# Patient Record
Sex: MALE | Race: OTHER | NOT HISPANIC OR LATINO | Employment: FULL TIME | URBAN - METROPOLITAN AREA
[De-identification: names, ages, dates, MRNs, and addresses within clinical notes are randomized per-mention and may not be internally consistent; named-entity substitution may affect disease eponyms.]

---

## 2023-03-29 LAB
BASOPHILS (10*3/UL) IN BLOOD BY AUTOMATED COUNT: 0.05 X10E9/L (ref 0–0.1)
BASOPHILS/100 LEUKOCYTES IN BLOOD BY AUTOMATED COUNT: 0.5 % (ref 0–2)
EOSINOPHILS (10*3/UL) IN BLOOD BY AUTOMATED COUNT: 0.2 X10E9/L (ref 0–0.7)
EOSINOPHILS/100 LEUKOCYTES IN BLOOD BY AUTOMATED COUNT: 2 % (ref 0–6)
ERYTHROCYTE DISTRIBUTION WIDTH (RATIO) BY AUTOMATED COUNT: 11.9 % (ref 11.5–14.5)
ERYTHROCYTE MEAN CORPUSCULAR HEMOGLOBIN CONCENTRATION (G/DL) BY AUTOMATED: 33.8 G/DL (ref 32–36)
ERYTHROCYTE MEAN CORPUSCULAR VOLUME (FL) BY AUTOMATED COUNT: 93 FL (ref 80–100)
ERYTHROCYTES (10*6/UL) IN BLOOD BY AUTOMATED COUNT: 4.77 X10E12/L (ref 4.5–5.9)
HEMATOCRIT (%) IN BLOOD BY AUTOMATED COUNT: 44.4 % (ref 41–52)
HEMOGLOBIN (G/DL) IN BLOOD: 15 G/DL (ref 13.5–17.5)
IMMATURE GRANULOCYTES/100 LEUKOCYTES IN BLOOD BY AUTOMATED COUNT: 0.5 % (ref 0–0.9)
LEUKOCYTES (10*3/UL) IN BLOOD BY AUTOMATED COUNT: 10.1 X10E9/L (ref 4.4–11.3)
LYMPHOCYTES (10*3/UL) IN BLOOD BY AUTOMATED COUNT: 1.71 X10E9/L (ref 1.2–4.8)
LYMPHOCYTES/100 LEUKOCYTES IN BLOOD BY AUTOMATED COUNT: 17 % (ref 13–44)
MONOCYTES (10*3/UL) IN BLOOD BY AUTOMATED COUNT: 1.08 X10E9/L (ref 0.1–1)
MONOCYTES/100 LEUKOCYTES IN BLOOD BY AUTOMATED COUNT: 10.7 % (ref 2–10)
NEUTROPHILS (10*3/UL) IN BLOOD BY AUTOMATED COUNT: 6.96 X10E9/L (ref 1.2–7.7)
NEUTROPHILS/100 LEUKOCYTES IN BLOOD BY AUTOMATED COUNT: 69.3 % (ref 40–80)
NRBC (PER 100 WBCS) BY AUTOMATED COUNT: 0 /100 WBC (ref 0–0)
PLATELETS (10*3/UL) IN BLOOD AUTOMATED COUNT: 266 X10E9/L (ref 150–450)

## 2023-07-01 LAB
ALANINE AMINOTRANSFERASE (SGPT) (U/L) IN SER/PLAS: 55 U/L (ref 10–52)
ALBUMIN (G/DL) IN SER/PLAS: 4.8 G/DL (ref 3.4–5)
ALKALINE PHOSPHATASE (U/L) IN SER/PLAS: 49 U/L (ref 33–120)
ASPARTATE AMINOTRANSFERASE (SGOT) (U/L) IN SER/PLAS: 31 U/L (ref 9–39)
BASOPHILS (10*3/UL) IN BLOOD BY AUTOMATED COUNT: 0.02 X10E9/L (ref 0–0.1)
BASOPHILS/100 LEUKOCYTES IN BLOOD BY AUTOMATED COUNT: 0.3 % (ref 0–2)
BILIRUBIN DIRECT (MG/DL) IN SER/PLAS: 0.1 MG/DL (ref 0–0.3)
BILIRUBIN TOTAL (MG/DL) IN SER/PLAS: 0.5 MG/DL (ref 0–1.2)
EOSINOPHILS (10*3/UL) IN BLOOD BY AUTOMATED COUNT: 0.17 X10E9/L (ref 0–0.7)
EOSINOPHILS/100 LEUKOCYTES IN BLOOD BY AUTOMATED COUNT: 2.3 % (ref 0–6)
ERYTHROCYTE DISTRIBUTION WIDTH (RATIO) BY AUTOMATED COUNT: 12.2 % (ref 11.5–14.5)
ERYTHROCYTE MEAN CORPUSCULAR HEMOGLOBIN CONCENTRATION (G/DL) BY AUTOMATED: 33.3 G/DL (ref 32–36)
ERYTHROCYTE MEAN CORPUSCULAR VOLUME (FL) BY AUTOMATED COUNT: 94 FL (ref 80–100)
ERYTHROCYTES (10*6/UL) IN BLOOD BY AUTOMATED COUNT: 4.84 X10E12/L (ref 4.5–5.9)
HEMATOCRIT (%) IN BLOOD BY AUTOMATED COUNT: 45.7 % (ref 41–52)
HEMOGLOBIN (G/DL) IN BLOOD: 15.2 G/DL (ref 13.5–17.5)
IMMATURE GRANULOCYTES/100 LEUKOCYTES IN BLOOD BY AUTOMATED COUNT: 0.4 % (ref 0–0.9)
LEUKOCYTES (10*3/UL) IN BLOOD BY AUTOMATED COUNT: 7.5 X10E9/L (ref 4.4–11.3)
LYMPHOCYTES (10*3/UL) IN BLOOD BY AUTOMATED COUNT: 1.53 X10E9/L (ref 1.2–4.8)
LYMPHOCYTES/100 LEUKOCYTES IN BLOOD BY AUTOMATED COUNT: 20.5 % (ref 13–44)
MONOCYTES (10*3/UL) IN BLOOD BY AUTOMATED COUNT: 0.73 X10E9/L (ref 0.1–1)
MONOCYTES/100 LEUKOCYTES IN BLOOD BY AUTOMATED COUNT: 9.8 % (ref 2–10)
NEUTROPHILS (10*3/UL) IN BLOOD BY AUTOMATED COUNT: 4.97 X10E9/L (ref 1.2–7.7)
NEUTROPHILS/100 LEUKOCYTES IN BLOOD BY AUTOMATED COUNT: 66.7 % (ref 40–80)
NRBC (PER 100 WBCS) BY AUTOMATED COUNT: 0 /100 WBC (ref 0–0)
PLATELETS (10*3/UL) IN BLOOD AUTOMATED COUNT: 238 X10E9/L (ref 150–450)
PROTEIN TOTAL: 7.1 G/DL (ref 6.4–8.2)

## 2023-09-24 LAB
BASOPHILS (10*3/UL) IN BLOOD BY AUTOMATED COUNT: 0.05 X10E9/L (ref 0–0.1)
BASOPHILS/100 LEUKOCYTES IN BLOOD BY AUTOMATED COUNT: 0.6 % (ref 0–2)
EOSINOPHILS (10*3/UL) IN BLOOD BY AUTOMATED COUNT: 0.21 X10E9/L (ref 0–0.7)
EOSINOPHILS/100 LEUKOCYTES IN BLOOD BY AUTOMATED COUNT: 2.7 % (ref 0–6)
ERYTHROCYTE DISTRIBUTION WIDTH (RATIO) BY AUTOMATED COUNT: 12 % (ref 11.5–14.5)
ERYTHROCYTE MEAN CORPUSCULAR HEMOGLOBIN CONCENTRATION (G/DL) BY AUTOMATED: 33.1 G/DL (ref 32–36)
ERYTHROCYTE MEAN CORPUSCULAR VOLUME (FL) BY AUTOMATED COUNT: 94 FL (ref 80–100)
ERYTHROCYTES (10*6/UL) IN BLOOD BY AUTOMATED COUNT: 5.02 X10E12/L (ref 4.5–5.9)
HEMATOCRIT (%) IN BLOOD BY AUTOMATED COUNT: 47.2 % (ref 41–52)
HEMOGLOBIN (G/DL) IN BLOOD: 15.6 G/DL (ref 13.5–17.5)
IMMATURE GRANULOCYTES/100 LEUKOCYTES IN BLOOD BY AUTOMATED COUNT: 0.5 % (ref 0–0.9)
LEUKOCYTES (10*3/UL) IN BLOOD BY AUTOMATED COUNT: 7.7 X10E9/L (ref 4.4–11.3)
LYMPHOCYTES (10*3/UL) IN BLOOD BY AUTOMATED COUNT: 1.2 X10E9/L (ref 1.2–4.8)
LYMPHOCYTES/100 LEUKOCYTES IN BLOOD BY AUTOMATED COUNT: 15.6 % (ref 13–44)
MONOCYTES (10*3/UL) IN BLOOD BY AUTOMATED COUNT: 0.79 X10E9/L (ref 0.1–1)
MONOCYTES/100 LEUKOCYTES IN BLOOD BY AUTOMATED COUNT: 10.3 % (ref 2–10)
NEUTROPHILS (10*3/UL) IN BLOOD BY AUTOMATED COUNT: 5.41 X10E9/L (ref 1.2–7.7)
NEUTROPHILS/100 LEUKOCYTES IN BLOOD BY AUTOMATED COUNT: 70.3 % (ref 40–80)
NRBC (PER 100 WBCS) BY AUTOMATED COUNT: 0 /100 WBC (ref 0–0)
PLATELETS (10*3/UL) IN BLOOD AUTOMATED COUNT: 245 X10E9/L (ref 150–450)

## 2023-10-10 ENCOUNTER — HOSPITAL ENCOUNTER (OUTPATIENT)
Dept: RADIOLOGY | Facility: CLINIC | Age: 28
Discharge: HOME | End: 2023-10-10
Payer: COMMERCIAL

## 2023-10-10 DIAGNOSIS — G35 MULTIPLE SCLEROSIS (MULTI): ICD-10-CM

## 2023-10-10 PROCEDURE — 2550000001 HC RX 255 CONTRASTS: Performed by: NURSE PRACTITIONER

## 2023-10-10 PROCEDURE — 70553 MRI BRAIN STEM W/O & W/DYE: CPT

## 2023-10-10 PROCEDURE — 70553 MRI BRAIN STEM W/O & W/DYE: CPT | Performed by: RADIOLOGY

## 2023-10-10 PROCEDURE — A9575 INJ GADOTERATE MEGLUMI 0.1ML: HCPCS | Performed by: NURSE PRACTITIONER

## 2023-10-10 RX ORDER — GADOTERATE MEGLUMINE 376.9 MG/ML
20 INJECTION INTRAVENOUS
Status: COMPLETED | OUTPATIENT
Start: 2023-10-10 | End: 2023-10-10

## 2023-10-10 RX ADMIN — GADOTERATE MEGLUMINE 20 ML: 376.9 INJECTION INTRAVENOUS at 09:28

## 2023-10-12 DIAGNOSIS — G35 MULTIPLE SCLEROSIS (MULTI): Primary | ICD-10-CM

## 2023-10-12 RX ORDER — DIROXIMEL FUMARATE 231 MG/1
2 CAPSULE ORAL 2 TIMES DAILY
Qty: 120 CAPSULE | Refills: 5 | Status: SHIPPED | OUTPATIENT
Start: 2023-10-12 | End: 2024-03-15

## 2023-12-21 ENCOUNTER — APPOINTMENT (OUTPATIENT)
Dept: NEUROLOGY | Facility: CLINIC | Age: 28
End: 2023-12-21
Payer: COMMERCIAL

## 2023-12-26 ENCOUNTER — LAB (OUTPATIENT)
Dept: LAB | Facility: LAB | Age: 28
End: 2023-12-26
Payer: COMMERCIAL

## 2023-12-26 DIAGNOSIS — G35 MULTIPLE SCLEROSIS (MULTI): Primary | ICD-10-CM

## 2023-12-26 LAB
BASOPHILS # BLD AUTO: 0.04 X10*3/UL (ref 0–0.1)
BASOPHILS NFR BLD AUTO: 0.4 %
EOSINOPHIL # BLD AUTO: 0.19 X10*3/UL (ref 0–0.7)
EOSINOPHIL NFR BLD AUTO: 2.1 %
ERYTHROCYTE [DISTWIDTH] IN BLOOD BY AUTOMATED COUNT: 11.9 % (ref 11.5–14.5)
HCT VFR BLD AUTO: 43.9 % (ref 41–52)
HGB BLD-MCNC: 15 G/DL (ref 13.5–17.5)
IMM GRANULOCYTES # BLD AUTO: 0.05 X10*3/UL (ref 0–0.7)
IMM GRANULOCYTES NFR BLD AUTO: 0.5 % (ref 0–0.9)
LYMPHOCYTES # BLD AUTO: 1.58 X10*3/UL (ref 1.2–4.8)
LYMPHOCYTES NFR BLD AUTO: 17.3 %
MCH RBC QN AUTO: 31.5 PG (ref 26–34)
MCHC RBC AUTO-ENTMCNC: 34.2 G/DL (ref 32–36)
MCV RBC AUTO: 92 FL (ref 80–100)
MONOCYTES # BLD AUTO: 0.94 X10*3/UL (ref 0.1–1)
MONOCYTES NFR BLD AUTO: 10.3 %
NEUTROPHILS # BLD AUTO: 6.31 X10*3/UL (ref 1.2–7.7)
NEUTROPHILS NFR BLD AUTO: 69.4 %
NRBC BLD-RTO: 0 /100 WBCS (ref 0–0)
PLATELET # BLD AUTO: 230 X10*3/UL (ref 150–450)
RBC # BLD AUTO: 4.76 X10*6/UL (ref 4.5–5.9)
WBC # BLD AUTO: 9.1 X10*3/UL (ref 4.4–11.3)

## 2023-12-26 PROCEDURE — 36415 COLL VENOUS BLD VENIPUNCTURE: CPT

## 2023-12-26 PROCEDURE — 85025 COMPLETE CBC W/AUTO DIFF WBC: CPT

## 2023-12-28 ENCOUNTER — OFFICE VISIT (OUTPATIENT)
Dept: NEUROLOGY | Facility: CLINIC | Age: 28
End: 2023-12-28
Payer: COMMERCIAL

## 2023-12-28 VITALS
DIASTOLIC BLOOD PRESSURE: 84 MMHG | HEIGHT: 72 IN | WEIGHT: 315 LBS | SYSTOLIC BLOOD PRESSURE: 147 MMHG | BODY MASS INDEX: 42.66 KG/M2 | RESPIRATION RATE: 18 BRPM | HEART RATE: 113 BPM

## 2023-12-28 DIAGNOSIS — G35 MULTIPLE SCLEROSIS (MULTI): Primary | ICD-10-CM

## 2023-12-28 PROCEDURE — 99214 OFFICE O/P EST MOD 30 MIN: CPT | Performed by: NURSE PRACTITIONER

## 2023-12-28 PROCEDURE — 1036F TOBACCO NON-USER: CPT | Performed by: NURSE PRACTITIONER

## 2023-12-28 ASSESSMENT — VISUAL ACUITY: VA_NORMAL: 1

## 2023-12-28 ASSESSMENT — PAIN SCALES - GENERAL: PAINLEVEL: 0-NO PAIN

## 2023-12-28 NOTE — PROGRESS NOTES
Patient name:   DISEASE SUMMARY:  Onset: 03/13  Diagnosis of MS: 07/14  Disease course at onset: RR  Current disease course: RR  Previous disease therapies: IVMP in 2013, Tecfidera since end of 8/14 stopped d/t insurance 2021.  Current disease therapies: Vumerity   Most recent MRI brain: 10/2023- stable  Most recent MRI cervical spine: 03/11/15  Most recent MRI thoracic spine: not done  CSF: 03/2013  JCV serology and date: not done  CBC:  WBC- 9.1  ALC-1.58  Subjective   Cirilo Wills is a 28 y.o.   male here for a follow up of his MS, he takes Vumerity and tolerating well. He reports no new MS symptoms, but continue to have right arm intermediate paresthesias ands sometimes pain.  Last MRI of the brain was 10/2023 and stable.    ROS  - No bowel or bladder issues, stable gait, no recent infections, and no trouble swallowing.    Objective   Neurological Exam  Mental Status  Alert. Oriented to person, place, time and situation. Oriented to person, place, and time. Recent and remote memory are intact. Speech is normal. Language is fluent with no aphasia. Attention and concentration are normal.    Cranial Nerves  CN I: Sense of smell is normal.  CN II: Tested with correction. Visual acuity is normal.  CN III, IV, VI: Normal lids and orbits bilaterally. Pupils equal round and reactive to light bilaterally.  CN V: Facial sensation is normal.  CN VII: Full and symmetric facial movement.  CN VIII: Hearing is normal.  CN IX, X: Palate elevates symmetrically  CN XI: Shoulder shrug strength is normal.  CN XII: Tongue midline without atrophy or fasciculations.    Motor  Normal muscle bulk throughout. Normal muscle tone. Strength is 5/5 throughout all four extremities.    Sensory  Light touch abnormality:   Reports decreased sensation on right side of face which is not new..    Reflexes                                            Right                      Left  Brachioradialis                    2+                          2+  Biceps                                 2+                         2+  Triceps                                2+                         2+  Patellar                                2+                         2+   Right palmar grasp present. Left palmar grasp present.    Coordination    Finger-to-nose, rapid alternating movements and heel-to-shin normal bilaterally without dysmetria.  9 hole peg test: Right hand 14.7 sec. Left hand 17.2 sec..    Gait  Casual gait is normal including stance, stride, and arm swing. Timed get up and go test: 6 seconds.    Physical Exam  Constitutional:       Appearance: Normal appearance.   HENT:      Head: Normocephalic.      Right Ear: Tympanic membrane normal.      Left Ear: Tympanic membrane normal.      Nose: Nose normal.      Mouth/Throat:      Mouth: Mucous membranes are moist.   Eyes:      General: Lids are normal.      Pupils: Pupils are equal, round, and reactive to light.   Cardiovascular:      Rate and Rhythm: Normal rate.   Pulmonary:      Effort: Pulmonary effort is normal.   Abdominal:      General: Abdomen is flat.   Musculoskeletal:         General: Normal range of motion.      Cervical back: Full passive range of motion without pain and normal range of motion.   Skin:     General: Skin is warm and dry.   Neurological:      Mental Status: He is alert and oriented to person, place, and time.      Motor: Motor strength is normal.     Coordination: Coordination is intact.      Deep Tendon Reflexes:      Reflex Scores:       Tricep reflexes are 2+ on the right side and 2+ on the left side.       Bicep reflexes are 2+ on the right side and 2+ on the left side.       Brachioradialis reflexes are 2+ on the right side and 2+ on the left side.       Patellar reflexes are 2+ on the right side and 2+ on the left side.  Psychiatric:         Attention and Perception: Attention normal.         Mood and Affect: Mood normal.         Speech: Speech normal.         Behavior: Behavior  normal. Behavior is cooperative.         Cognition and Memory: Cognition normal.         Judgment: Judgment normal.       Provider Impression  Cirilo Wills is a 28 y.o.  RH male here for a follow up of his MS, he takes Vumerity and tolerating well. He reports no new MS symptoms, but continue to have right arm intermediate paresthesias ands sometimes pain.  Last MRI of the brain was 10/2023 and stable.    We discussed the importance of living healthy life style with diet and exercise and the importance of V-D in patient's with MS.    The total face to face appointment was 45 minutes and more than 50% of the visit was spent counseling and coordination of care.    I personally reviewed laboratory, radiographic, and medical studies which were pertinent for today's visit.    Plan  - Continue Vumerity.  - Labs today.  - MRI June 2024.  - Follow up 6 month.o90

## 2024-03-14 ENCOUNTER — TELEPHONE (OUTPATIENT)
Dept: PRIMARY CARE | Facility: CLINIC | Age: 29
End: 2024-03-14

## 2024-03-15 DIAGNOSIS — G35 MULTIPLE SCLEROSIS (MULTI): ICD-10-CM

## 2024-03-15 RX ORDER — DIROXIMEL FUMARATE 231 MG/1
2 CAPSULE ORAL 2 TIMES DAILY
Qty: 120 CAPSULE | Refills: 5 | Status: SHIPPED | OUTPATIENT
Start: 2024-03-15

## 2024-03-18 ENCOUNTER — LAB (OUTPATIENT)
Dept: LAB | Facility: LAB | Age: 29
End: 2024-03-18
Payer: COMMERCIAL

## 2024-03-18 ENCOUNTER — HOSPITAL ENCOUNTER (OUTPATIENT)
Dept: RADIOLOGY | Facility: CLINIC | Age: 29
Discharge: HOME | End: 2024-03-18
Payer: COMMERCIAL

## 2024-03-18 ENCOUNTER — OFFICE VISIT (OUTPATIENT)
Dept: PRIMARY CARE | Facility: CLINIC | Age: 29
End: 2024-03-18
Payer: COMMERCIAL

## 2024-03-18 VITALS
WEIGHT: 315 LBS | BODY MASS INDEX: 45.03 KG/M2 | TEMPERATURE: 98 F | DIASTOLIC BLOOD PRESSURE: 84 MMHG | HEART RATE: 111 BPM | SYSTOLIC BLOOD PRESSURE: 140 MMHG

## 2024-03-18 DIAGNOSIS — Z11.59 ENCOUNTER FOR HEPATITIS C SCREENING TEST FOR LOW RISK PATIENT: ICD-10-CM

## 2024-03-18 DIAGNOSIS — Z13.1 DIABETES MELLITUS SCREENING: ICD-10-CM

## 2024-03-18 DIAGNOSIS — Z11.4 ENCOUNTER FOR SCREENING FOR HIV: ICD-10-CM

## 2024-03-18 DIAGNOSIS — R06.09 EXERTIONAL DYSPNEA: ICD-10-CM

## 2024-03-18 DIAGNOSIS — Z13.220 SCREENING CHOLESTEROL LEVEL: ICD-10-CM

## 2024-03-18 DIAGNOSIS — Z23 IMMUNIZATION DUE: ICD-10-CM

## 2024-03-18 DIAGNOSIS — Z00.00 ANNUAL PHYSICAL EXAM: ICD-10-CM

## 2024-03-18 DIAGNOSIS — Z00.00 HEALTH CARE MAINTENANCE: Primary | ICD-10-CM

## 2024-03-18 DIAGNOSIS — R94.31 ABNORMAL EKG: ICD-10-CM

## 2024-03-18 DIAGNOSIS — G35 MULTIPLE SCLEROSIS (MULTI): ICD-10-CM

## 2024-03-18 PROBLEM — K57.32 DIVERTICULITIS, COLON: Status: ACTIVE | Noted: 2023-01-05

## 2024-03-18 PROBLEM — E03.9 HYPOTHYROIDISM: Status: ACTIVE | Noted: 2019-01-25

## 2024-03-18 PROBLEM — G47.33 OBSTRUCTIVE SLEEP APNEA: Status: ACTIVE | Noted: 2019-03-20

## 2024-03-18 PROBLEM — R73.03 PREDIABETES: Status: ACTIVE | Noted: 2024-03-18

## 2024-03-18 PROBLEM — E78.2 COMBINED HYPERLIPIDEMIA: Status: ACTIVE | Noted: 2024-03-18

## 2024-03-18 PROBLEM — R79.89 LOW VITAMIN D LEVEL: Status: ACTIVE | Noted: 2024-03-18

## 2024-03-18 LAB
ALBUMIN SERPL BCP-MCNC: 4.7 G/DL (ref 3.4–5)
ALP SERPL-CCNC: 53 U/L (ref 33–120)
ALT SERPL W P-5'-P-CCNC: 55 U/L (ref 10–52)
ANION GAP SERPL CALC-SCNC: 15 MMOL/L (ref 10–20)
AST SERPL W P-5'-P-CCNC: 28 U/L (ref 9–39)
BILIRUB DIRECT SERPL-MCNC: 0 MG/DL (ref 0–0.3)
BILIRUB SERPL-MCNC: 0.4 MG/DL (ref 0–1.2)
BNP SERPL-MCNC: 49 PG/ML (ref 0–99)
BUN SERPL-MCNC: 17 MG/DL (ref 6–23)
CALCIUM SERPL-MCNC: 10.3 MG/DL (ref 8.6–10.6)
CHLORIDE SERPL-SCNC: 101 MMOL/L (ref 98–107)
CO2 SERPL-SCNC: 28 MMOL/L (ref 21–32)
CREAT SERPL-MCNC: 0.95 MG/DL (ref 0.5–1.3)
EGFRCR SERPLBLD CKD-EPI 2021: >90 ML/MIN/1.73M*2
ERYTHROCYTE [DISTWIDTH] IN BLOOD BY AUTOMATED COUNT: 12 % (ref 11.5–14.5)
GLUCOSE SERPL-MCNC: 93 MG/DL (ref 74–99)
HCT VFR BLD AUTO: 45.5 % (ref 41–52)
HCV AB SER QL: NONREACTIVE
HGB BLD-MCNC: 15.6 G/DL (ref 13.5–17.5)
LDLC SERPL DIRECT ASSAY-MCNC: 153 MG/DL (ref 0–129)
MCH RBC QN AUTO: 32.1 PG (ref 26–34)
MCHC RBC AUTO-ENTMCNC: 34.3 G/DL (ref 32–36)
MCV RBC AUTO: 94 FL (ref 80–100)
NRBC BLD-RTO: 0 /100 WBCS (ref 0–0)
PHOSPHATE SERPL-MCNC: 4.7 MG/DL (ref 2.5–4.9)
PLATELET # BLD AUTO: 280 X10*3/UL (ref 150–450)
POTASSIUM SERPL-SCNC: 4.2 MMOL/L (ref 3.5–5.3)
PROT SERPL-MCNC: 7.2 G/DL (ref 6.4–8.2)
RBC # BLD AUTO: 4.86 X10*6/UL (ref 4.5–5.9)
SODIUM SERPL-SCNC: 140 MMOL/L (ref 136–145)
TSH SERPL-ACNC: 3.67 MIU/L (ref 0.44–3.98)
WBC # BLD AUTO: 10.1 X10*3/UL (ref 4.4–11.3)

## 2024-03-18 PROCEDURE — 83721 ASSAY OF BLOOD LIPOPROTEIN: CPT

## 2024-03-18 PROCEDURE — 71046 X-RAY EXAM CHEST 2 VIEWS: CPT

## 2024-03-18 PROCEDURE — 84443 ASSAY THYROID STIM HORMONE: CPT

## 2024-03-18 PROCEDURE — 83880 ASSAY OF NATRIURETIC PEPTIDE: CPT

## 2024-03-18 PROCEDURE — 86803 HEPATITIS C AB TEST: CPT

## 2024-03-18 PROCEDURE — 80053 COMPREHEN METABOLIC PANEL: CPT

## 2024-03-18 PROCEDURE — 99213 OFFICE O/P EST LOW 20 MIN: CPT | Performed by: FAMILY MEDICINE

## 2024-03-18 PROCEDURE — 85027 COMPLETE CBC AUTOMATED: CPT

## 2024-03-18 PROCEDURE — 82248 BILIRUBIN DIRECT: CPT

## 2024-03-18 PROCEDURE — 36415 COLL VENOUS BLD VENIPUNCTURE: CPT

## 2024-03-18 PROCEDURE — 87389 HIV-1 AG W/HIV-1&-2 AB AG IA: CPT

## 2024-03-18 PROCEDURE — 99395 PREV VISIT EST AGE 18-39: CPT | Performed by: FAMILY MEDICINE

## 2024-03-18 PROCEDURE — 71046 X-RAY EXAM CHEST 2 VIEWS: CPT | Performed by: RADIOLOGY

## 2024-03-18 PROCEDURE — 93000 ELECTROCARDIOGRAM COMPLETE: CPT | Performed by: FAMILY MEDICINE

## 2024-03-18 PROCEDURE — 84100 ASSAY OF PHOSPHORUS: CPT

## 2024-03-18 PROCEDURE — 1036F TOBACCO NON-USER: CPT | Performed by: FAMILY MEDICINE

## 2024-03-18 NOTE — PROGRESS NOTES
Subjective   Patient ID: Cirilo Wills is a 28 y.o. male who presents for Weight Check.  HPI    The patient is a 27 yo AA Male with a history of HDL, hypothyroidism, prediabetes, MS, KG, low vit D, diverticulitis, here for:  1-  CPE.  - blood test ordered.  -No family history of colon or prostate cancer.  -Immunizations: COVID OTD.  Influenza UTD at work.    2- the management of an exertional shortness that started 3- 4 months.     A review of system was completed.  All systems were reviewed and were normal, except for the ones that are listed in the HPI.    Objective   Physical Exam  Constitutional:       Appearance: Normal appearance.   HENT:      Head: Normocephalic and atraumatic.      Right Ear: Tympanic membrane, ear canal and external ear normal.      Left Ear: Tympanic membrane, ear canal and external ear normal.      Nose: Nose normal.      Mouth/Throat:      Mouth: Mucous membranes are moist.      Pharynx: Oropharynx is clear.   Eyes:      Extraocular Movements: Extraocular movements intact.      Conjunctiva/sclera: Conjunctivae normal.      Pupils: Pupils are equal, round, and reactive to light.   Cardiovascular:      Rate and Rhythm: Normal rate and regular rhythm.      Pulses: Normal pulses.   Pulmonary:      Effort: Pulmonary effort is normal.      Breath sounds: Normal breath sounds.   Abdominal:      General: Abdomen is flat. Bowel sounds are normal.      Palpations: Abdomen is soft.   Musculoskeletal:         General: Normal range of motion.      Cervical back: Normal range of motion and neck supple.   Skin:     General: Skin is warm.   Neurological:      General: No focal deficit present.      Mental Status: He is alert and oriented to person, place, and time. Mental status is at baseline.   Psychiatric:         Mood and Affect: Mood normal.         Behavior: Behavior normal.         Thought Content: Thought content normal.         Judgment: Judgment normal.     Assessment/Plan   Problem List Items  Addressed This Visit       Exertional dyspnea     -patient is tachycardic today- HR: 111 at rest.  Repeat HR on EK.   -EKG done.  -Blood and CXR ordered.  -PFT and 2D echo if not better and normal tests above in 2- 4 weeks, then follow-up.          Relevant Orders    ECG 12 lead (Clinic Performed) (Completed)    XR chest 2 views    B-type natriuretic peptide    Complete Pulmonary Function Test (Spirometry/DLCO/Lung Volumes)    LDL cholesterol, direct    Complete Pulmonary Function Test (Spirometry/DLCO/Lung Volumes)    Transthoracic Echo (TTE) Complete    Magnesium    Phosphorus    Stress Test    Annual physical exam    Relevant Orders    Comprehensive Metabolic Panel    CBC    TSH with reflex to Free T4 if abnormal    Health care maintenance - Primary     - blood test ordered.  -No family history of colon or prostate cancer.  -Immunizations: COVID OTD.  Influenza UTD at work.         Immunization due    Encounter for screening for HIV    Relevant Orders    HIV 1/2 Antigen/Antibody Screen with Reflex to Confirmation    Encounter for hepatitis C screening test for low risk patient    Relevant Orders    Hepatitis C Antibody    Screening cholesterol level    Relevant Orders    LDL cholesterol, direct    Diabetes mellitus screening     Other Visit Diagnoses       Abnormal EKG             Patient to return to office in 1 months if not better

## 2024-03-18 NOTE — ASSESSMENT & PLAN NOTE
-patient is tachycardic today- HR: 111 at rest.  Repeat HR on EK.   -EKG done.  -Blood and CXR ordered.  -PFT and 2D echo if not better and normal tests above in 2- 4 weeks, then follow-up.

## 2024-03-19 LAB — HIV 1+2 AB+HIV1 P24 AG SERPL QL IA: NONREACTIVE

## 2024-03-21 DIAGNOSIS — R74.8 ELEVATED LIVER ENZYMES: Primary | ICD-10-CM

## 2024-03-27 ENCOUNTER — HOSPITAL ENCOUNTER (OUTPATIENT)
Dept: CARDIOLOGY | Facility: CLINIC | Age: 29
Discharge: HOME | End: 2024-03-27
Payer: COMMERCIAL

## 2024-03-27 DIAGNOSIS — R06.09 EXERTIONAL DYSPNEA: Primary | ICD-10-CM

## 2024-03-27 DIAGNOSIS — R06.09 EXERTIONAL DYSPNEA: ICD-10-CM

## 2024-03-27 PROCEDURE — 93306 TTE W/DOPPLER COMPLETE: CPT

## 2024-03-27 PROCEDURE — 93306 TTE W/DOPPLER COMPLETE: CPT | Performed by: INTERNAL MEDICINE

## 2024-03-27 PROCEDURE — 2500000004 HC RX 250 GENERAL PHARMACY W/ HCPCS (ALT 636 FOR OP/ED): Performed by: FAMILY MEDICINE

## 2024-03-27 RX ADMIN — PERFLUTREN 3 ML OF DILUTION: 6.52 INJECTION, SUSPENSION INTRAVENOUS at 14:51

## 2024-03-28 LAB
AORTIC VALVE MEAN GRADIENT: 3.8 MMHG
AORTIC VALVE PEAK VELOCITY: 1.22 M/S
AV PEAK GRADIENT: 6 MMHG
AVA (PEAK VEL): 1.82 CM2
AVA (VTI): 1.79 CM2
EJECTION FRACTION APICAL 4 CHAMBER: 29.1
EJECTION FRACTION: 30 %
LEFT ATRIUM VOLUME AREA LENGTH INDEX BSA: 38.4 ML/M2
LEFT VENTRICLE INTERNAL DIMENSION DIASTOLE: 5.55 CM (ref 3.5–6)
LEFT VENTRICULAR OUTFLOW TRACT DIAMETER: 2.01 CM
MITRAL VALVE E/A RATIO: 1.5
MITRAL VALVE E/E' RATIO: 8.95
RIGHT VENTRICLE FREE WALL PEAK S': 18.32 CM/S
TRICUSPID ANNULAR PLANE SYSTOLIC EXCURSION: 1.7 CM

## 2024-04-03 PROBLEM — M25.522 LEFT ELBOW PAIN: Status: ACTIVE | Noted: 2024-04-03

## 2024-04-03 PROBLEM — M25.569 CHRONIC KNEE PAIN: Status: ACTIVE | Noted: 2024-04-03

## 2024-04-03 PROBLEM — R05.3 CHRONIC COUGH: Status: ACTIVE | Noted: 2024-04-03

## 2024-04-03 PROBLEM — E04.9 GOITER: Status: ACTIVE | Noted: 2024-04-03

## 2024-04-03 PROBLEM — E05.90 HYPERTHYROIDISM: Status: ACTIVE | Noted: 2019-01-25

## 2024-04-03 PROBLEM — Z13.220 SCREENING CHOLESTEROL LEVEL: Status: RESOLVED | Noted: 2024-03-18 | Resolved: 2024-04-03

## 2024-04-03 PROBLEM — R30.0 DYSURIA: Status: ACTIVE | Noted: 2024-04-03

## 2024-04-03 PROBLEM — G56.22: Status: ACTIVE | Noted: 2024-04-03

## 2024-04-03 PROBLEM — K52.9 CHRONIC DIARRHEA: Status: ACTIVE | Noted: 2024-04-03

## 2024-04-03 PROBLEM — G89.29 CHRONIC KNEE PAIN: Status: ACTIVE | Noted: 2024-04-03

## 2024-04-03 PROBLEM — F12.90 CANNABIS USE DISORDER: Status: ACTIVE | Noted: 2023-02-02

## 2024-04-05 ENCOUNTER — LAB (OUTPATIENT)
Dept: LAB | Facility: LAB | Age: 29
End: 2024-04-05
Payer: COMMERCIAL

## 2024-04-05 ENCOUNTER — OFFICE VISIT (OUTPATIENT)
Dept: GASTROENTEROLOGY | Facility: CLINIC | Age: 29
End: 2024-04-05
Payer: COMMERCIAL

## 2024-04-05 VITALS
BODY MASS INDEX: 42.66 KG/M2 | WEIGHT: 315 LBS | TEMPERATURE: 98.5 F | DIASTOLIC BLOOD PRESSURE: 75 MMHG | HEIGHT: 72 IN | HEART RATE: 114 BPM | SYSTOLIC BLOOD PRESSURE: 148 MMHG

## 2024-04-05 DIAGNOSIS — R74.8 ELEVATED LIVER ENZYMES: ICD-10-CM

## 2024-04-05 LAB
ALBUMIN SERPL BCP-MCNC: 4.8 G/DL (ref 3.4–5)
ALP SERPL-CCNC: 59 U/L (ref 33–120)
ALT SERPL W P-5'-P-CCNC: 55 U/L (ref 10–52)
ANION GAP SERPL CALC-SCNC: 16 MMOL/L (ref 10–20)
AST SERPL W P-5'-P-CCNC: 28 U/L (ref 9–39)
BASOPHILS # BLD AUTO: 0.05 X10*3/UL (ref 0–0.1)
BASOPHILS NFR BLD AUTO: 0.6 %
BILIRUB SERPL-MCNC: 0.4 MG/DL (ref 0–1.2)
BUN SERPL-MCNC: 15 MG/DL (ref 6–23)
CALCIUM SERPL-MCNC: 10.3 MG/DL (ref 8.6–10.6)
CHLORIDE SERPL-SCNC: 103 MMOL/L (ref 98–107)
CO2 SERPL-SCNC: 26 MMOL/L (ref 21–32)
CREAT SERPL-MCNC: 0.92 MG/DL (ref 0.5–1.3)
EGFRCR SERPLBLD CKD-EPI 2021: >90 ML/MIN/1.73M*2
EOSINOPHIL # BLD AUTO: 0.15 X10*3/UL (ref 0–0.7)
EOSINOPHIL NFR BLD AUTO: 1.9 %
ERYTHROCYTE [DISTWIDTH] IN BLOOD BY AUTOMATED COUNT: 11.9 % (ref 11.5–14.5)
GLUCOSE SERPL-MCNC: 89 MG/DL (ref 74–99)
HBV SURFACE AB SER-ACNC: 40.2 MIU/ML
HCT VFR BLD AUTO: 44.4 % (ref 41–52)
HGB BLD-MCNC: 15.5 G/DL (ref 13.5–17.5)
IMM GRANULOCYTES # BLD AUTO: 0.03 X10*3/UL (ref 0–0.7)
IMM GRANULOCYTES NFR BLD AUTO: 0.4 % (ref 0–0.9)
LYMPHOCYTES # BLD AUTO: 1.51 X10*3/UL (ref 1.2–4.8)
LYMPHOCYTES NFR BLD AUTO: 19.2 %
MCH RBC QN AUTO: 31.4 PG (ref 26–34)
MCHC RBC AUTO-ENTMCNC: 34.9 G/DL (ref 32–36)
MCV RBC AUTO: 90 FL (ref 80–100)
MONOCYTES # BLD AUTO: 0.87 X10*3/UL (ref 0.1–1)
MONOCYTES NFR BLD AUTO: 11.1 %
NEUTROPHILS # BLD AUTO: 5.26 X10*3/UL (ref 1.2–7.7)
NEUTROPHILS NFR BLD AUTO: 66.8 %
NRBC BLD-RTO: 0 /100 WBCS (ref 0–0)
PLATELET # BLD AUTO: 258 X10*3/UL (ref 150–450)
POTASSIUM SERPL-SCNC: 4.3 MMOL/L (ref 3.5–5.3)
PROT SERPL-MCNC: 7.4 G/DL (ref 6.4–8.2)
RBC # BLD AUTO: 4.93 X10*6/UL (ref 4.5–5.9)
SODIUM SERPL-SCNC: 141 MMOL/L (ref 136–145)
WBC # BLD AUTO: 7.9 X10*3/UL (ref 4.4–11.3)

## 2024-04-05 PROCEDURE — 80053 COMPREHEN METABOLIC PANEL: CPT

## 2024-04-05 PROCEDURE — 82390 ASSAY OF CERULOPLASMIN: CPT

## 2024-04-05 PROCEDURE — 86235 NUCLEAR ANTIGEN ANTIBODY: CPT

## 2024-04-05 PROCEDURE — 86708 HEPATITIS A ANTIBODY: CPT

## 2024-04-05 PROCEDURE — 36415 COLL VENOUS BLD VENIPUNCTURE: CPT

## 2024-04-05 PROCEDURE — 99214 OFFICE O/P EST MOD 30 MIN: CPT | Performed by: NURSE PRACTITIONER

## 2024-04-05 PROCEDURE — 86706 HEP B SURFACE ANTIBODY: CPT

## 2024-04-05 PROCEDURE — 86381 MITOCHONDRIAL ANTIBODY EACH: CPT

## 2024-04-05 PROCEDURE — 86015 ACTIN ANTIBODY EACH: CPT

## 2024-04-05 PROCEDURE — 85025 COMPLETE CBC W/AUTO DIFF WBC: CPT

## 2024-04-05 PROCEDURE — 86038 ANTINUCLEAR ANTIBODIES: CPT

## 2024-04-05 PROCEDURE — 99204 OFFICE O/P NEW MOD 45 MIN: CPT | Performed by: NURSE PRACTITIONER

## 2024-04-05 PROCEDURE — 86225 DNA ANTIBODY NATIVE: CPT

## 2024-04-05 PROCEDURE — 1036F TOBACCO NON-USER: CPT | Performed by: NURSE PRACTITIONER

## 2024-04-05 ASSESSMENT — ENCOUNTER SYMPTOMS
WHEEZING: 0
FREQUENCY: 0
APPETITE CHANGE: 0
FEVER: 0
DIFFICULTY URINATING: 0
TREMORS: 0
DIARRHEA: 0
UNEXPECTED WEIGHT CHANGE: 0
SLEEP DISTURBANCE: 0
ABDOMINAL PAIN: 0
NAUSEA: 0
PALPITATIONS: 0
BLOOD IN STOOL: 0
VOICE CHANGE: 0
NUMBNESS: 0
AGITATION: 0
LIGHT-HEADEDNESS: 0
BRUISES/BLEEDS EASILY: 0
HEADACHES: 0
ABDOMINAL DISTENTION: 0
JOINT SWELLING: 0
VOMITING: 0
DIZZINESS: 0
COUGH: 0
CHILLS: 0
CONSTIPATION: 0
SHORTNESS OF BREATH: 0
HEMATURIA: 0
TROUBLE SWALLOWING: 0
CONFUSION: 0
COLOR CHANGE: 0
WEAKNESS: 0
DYSURIA: 0

## 2024-04-05 NOTE — PROGRESS NOTES
Subjective   Patient ID: Cirilo Wills is a 28 y.o. male who presents for consult for elevated liver tests.      HPI  28 year old male referred for elevated liver enzymes.   Pt has history of MS; currently on Vumerity BID for the last 4 years.  To his knowledge, his labs have only recently been elevated, primarily ALT.    03/18/2024:  ALT 55, AST 28.  07/01/2023:  ALT 55, AST 31  10/15/2021:  ALT 60, AST 31  No recent imaging.  Denies any abdominal pain, fevers, chills, nausea, vomiting, jaundice, icterus or bleeding.  His weight has been going up over the last couple of years and he admits to not being every active.  He does try to eat healthy.  He works from home for the [employer].  ETOH intake is whiskey 6oz 2-3 days per week.  No history of drug use.     Review of Systems   Constitutional:  Negative for appetite change, chills, fever and unexpected weight change.   HENT:  Negative for mouth sores, nosebleeds, trouble swallowing and voice change.    Eyes:  Negative for visual disturbance.   Respiratory:  Negative for cough, shortness of breath and wheezing.    Cardiovascular:  Negative for chest pain, palpitations and leg swelling.   Gastrointestinal:  Negative for abdominal distention, abdominal pain, blood in stool, constipation, diarrhea, nausea and vomiting.   Genitourinary:  Negative for decreased urine volume, difficulty urinating, dysuria, frequency, hematuria and urgency.   Musculoskeletal:  Negative for gait problem and joint swelling.   Skin:  Negative for color change, pallor and rash.   Neurological:  Negative for dizziness, tremors, weakness, light-headedness, numbness and headaches.   Hematological:  Does not bruise/bleed easily.   Psychiatric/Behavioral:  Negative for agitation, behavioral problems, confusion and sleep disturbance.        Objective   Physical Exam  Constitutional:       General: He is awake.      Appearance: Normal appearance. He is well-developed.   HENT:      Head: Normocephalic and  atraumatic.      Right Ear: Hearing normal.      Left Ear: Hearing normal.      Nose: Nose normal.      Mouth/Throat:      Lips: Pink.      Mouth: Mucous membranes are moist.   Eyes:      General: Lids are normal.      Extraocular Movements: Extraocular movements intact.      Conjunctiva/sclera: Conjunctivae normal.      Pupils: Pupils are equal, round, and reactive to light.   Cardiovascular:      Rate and Rhythm: Normal rate and regular rhythm.      Pulses: Normal pulses.      Heart sounds: Normal heart sounds.   Pulmonary:      Effort: Pulmonary effort is normal.      Breath sounds: Normal breath sounds.   Abdominal:      General: Abdomen is flat. Bowel sounds are normal.      Palpations: Abdomen is soft.   Musculoskeletal:      Cervical back: Normal range of motion and neck supple.   Feet:      Right foot:      Skin integrity: Skin integrity normal.      Left foot:      Skin integrity: Skin integrity normal.   Skin:     General: Skin is warm.   Neurological:      General: No focal deficit present.      Mental Status: He is alert and oriented to person, place, and time.      Cranial Nerves: Cranial nerves 2-12 are intact.      Sensory: Sensation is intact.      Motor: Motor function is intact.      Coordination: Coordination is intact.      Gait: Gait is intact.   Psychiatric:         Attention and Perception: Attention and perception normal.         Mood and Affect: Mood normal.         Speech: Speech normal.         Behavior: Behavior is cooperative.         Thought Content: Thought content normal.         Cognition and Memory: Cognition normal.         Judgment: Judgment normal.         Assessment/Plan     Elevated liver enzymes, mainly ALT.  DDX includes SEBASTIAN vs DILI vs autiommune disease.   His current medication Vumerity has been shown to cause acute liver enzyme elevation with severe transaminitis.  However, he has been on this for the better part of 4 years and this is lower on the differential.   Suspect  this is SEBASTIAN related to obesity, preDM and HLD.   Discussed this at length and will complete liver disease work-up and will order fibroscan to confirm degree of steatosis or fibrosis.   Dietary and lifestyle interventions were recommended as follows:    Perform vigorous physical activity like brisk walking or structured gym exercises 3 times a week for 30 minutes or more frequently.    Low simple sugar/carb diet, more complex carbs, high protein diet.    Exercise for 30 minutes or more at least 3 times a week  focus on exercises that build muscle mass like working with weights and lifting. Try swimming or water aerobics if you have access to a pool    Aim to lose 7-10% of your total body weight over 6-12 months      Will call pt with results.      TAMMI with Reflex to SYEDA (Completed)          Future, Expected: 4/5/2024 Approximate, Expires: 4/5/2025, Lab Collect, Specimen Sources - Blood, Venous;, Specimen Types - Blood;, Resulting Agency - [facility] ([facility]), New collection       Anti-Mitochondrial Antibody (Completed)         Future, Expected: 4/5/2024 Approximate, Expires: 4/5/2025, Lab Collect, Specimen Sources - Blood, Venous;, Specimen Types - Blood;, Resulting Agency - [facility] ([facility]), New collection       Anti-Smooth Muscle Antibody (Completed)         Future, Expected: 4/5/2024 Approximate, Expires: 4/5/2025, Lab Collect, Specimen Sources - Blood, Venous;, Specimen Types - Blood;, Resulting Agency - [facility] ([facility]), New collection       CBC and Auto Differential (Completed)         Future, Expected: 4/5/2024 Approximate, Expires: 4/5/2025, Lab Collect, Specimen Sources - Blood, Venous;, Specimen Types - Blood;, Resulting Agency - [facility] ([facility]), New collection       Ceruloplasmin (Completed)         Future, Expected: 4/5/2024 Approximate, Expires: 4/5/2025, Lab Collect, Specimen Sources - Blood, Venous;, Specimen Types - Blood;, Resulting Agency - [facility] ([facility]), New collection        Comprehensive Metabolic Panel (Completed)         Future, Expected: 4/5/2024 Approximate, Expires: 4/5/2025, Lab Collect, Specimen Sources - Blood, Venous;, Specimen Types - Blood;, Resulting Agency - [facility] ([facility]), New collection       Hepatitis A Antibody, Total (Completed)         Future, Expected: 4/5/2024 Approximate, Expires: 4/5/2025, Lab Collect, Specimen Sources - Blood, Venous;, Specimen Types - Blood;, Resulting Agency - [facility] ([facility]), New collection       Hepatitis B Surface Antibody (Completed)         Future, Expected: 4/5/2024 Approximate, Expires: 4/5/2025, Lab Collect, Specimen Sources - Blood, Venous;, Specimen Types - Blood;, Resulting Agency - [facility] ([facility]), New collection       Liver Elastography (Fibroscan) (Completed)         Clinic Performed       LISSETTE Choi 05/21/24 11:37 AM

## 2024-04-05 NOTE — ASSESSMENT & PLAN NOTE
Elevated liver enzymes, mainly ALT.  DDX includes SEBASTIAN vs DILI vs autiommune disease.   His current medication Vumerity has been shown to cause acute liver enzyme elevation with severe transaminitis.  However, he has been on this for the better part of 4 years and this is lower on the differential.   Suspect this is SEBASTIAN related to obesity, preDM and HLD.   Discussed this at length and will complete liver disease work-up and will order fibroscan to confirm degree of steatosis or fibrosis.   Dietary and lifestyle interventions were recommended as follows:    Perform vigorous physical activity like brisk walking or structured gym exercises 3 times a week for 30 minutes or more frequently.    Low simple sugar/carb diet, more complex carbs, high protein diet.    Exercise for 30 minutes or more at least 3 times a week  focus on exercises that build muscle mass like working with weights and lifting. Try swimming or water aerobics if you have access to a pool    Aim to lose 7-10% of your total body weight over 6-12 months      Will call pt with results.

## 2024-04-06 LAB
CERULOPLASMIN SERPL-MCNC: 28.2 MG/DL (ref 20–60)
HAV AB SER QL IA: NONREACTIVE

## 2024-04-08 LAB
ANA PATTERN: ABNORMAL
ANA SER QL HEP2 SUBST: POSITIVE
ANA TITR SER IF: ABNORMAL {TITER}
CENTROMERE B AB SER-ACNC: <0.2 AI
CHROMATIN AB SERPL-ACNC: <0.2 AI
DSDNA AB SER-ACNC: 1 IU/ML
ENA JO1 AB SER QL IA: <0.2 AI
ENA RNP AB SER IA-ACNC: <0.2 AI
ENA SCL70 AB SER QL IA: <0.2 AI
ENA SM AB SER IA-ACNC: <0.2 AI
ENA SM+RNP AB SER QL IA: <0.2 AI
ENA SS-A AB SER IA-ACNC: <0.2 AI
ENA SS-B AB SER IA-ACNC: <0.2 AI
RIBOSOMAL P AB SER-ACNC: <0.2 AI

## 2024-04-09 ENCOUNTER — SPECIALTY PHARMACY (OUTPATIENT)
Dept: PHARMACY | Facility: CLINIC | Age: 29
End: 2024-04-09

## 2024-04-09 LAB
MITOCHONDRIA AB SER QL IF: NEGATIVE
SMOOTH MUSCLE AB SER QL IF: NEGATIVE

## 2024-04-11 ENCOUNTER — LAB (OUTPATIENT)
Dept: LAB | Facility: LAB | Age: 29
End: 2024-04-11
Payer: COMMERCIAL

## 2024-04-11 ENCOUNTER — OFFICE VISIT (OUTPATIENT)
Dept: CARDIOLOGY | Facility: CLINIC | Age: 29
End: 2024-04-11
Payer: COMMERCIAL

## 2024-04-11 VITALS
BODY MASS INDEX: 42.66 KG/M2 | SYSTOLIC BLOOD PRESSURE: 141 MMHG | DIASTOLIC BLOOD PRESSURE: 88 MMHG | OXYGEN SATURATION: 97 % | HEIGHT: 72 IN | HEART RATE: 108 BPM | WEIGHT: 315 LBS

## 2024-04-11 DIAGNOSIS — R06.09 EXERTIONAL DYSPNEA: ICD-10-CM

## 2024-04-11 DIAGNOSIS — E78.2 COMBINED HYPERLIPIDEMIA: ICD-10-CM

## 2024-04-11 DIAGNOSIS — I42.0 DILATED CARDIOMYOPATHY (MULTI): Chronic | ICD-10-CM

## 2024-04-11 DIAGNOSIS — R06.09 EXERTIONAL DYSPNEA: Primary | ICD-10-CM

## 2024-04-11 DIAGNOSIS — I42.0 DILATED CARDIOMYOPATHY (MULTI): ICD-10-CM

## 2024-04-11 PROBLEM — Z00.00 ANNUAL PHYSICAL EXAM: Status: RESOLVED | Noted: 2024-03-18 | Resolved: 2024-04-11

## 2024-04-11 PROBLEM — G35 MULTIPLE SCLEROSIS (MULTI): Chronic | Status: ACTIVE | Noted: 2022-10-21

## 2024-04-11 PROBLEM — M25.522 LEFT ELBOW PAIN: Status: RESOLVED | Noted: 2024-04-03 | Resolved: 2024-04-11

## 2024-04-11 PROBLEM — Z11.59 ENCOUNTER FOR HEPATITIS C SCREENING TEST FOR LOW RISK PATIENT: Status: RESOLVED | Noted: 2024-03-18 | Resolved: 2024-04-11

## 2024-04-11 PROBLEM — K52.9 CHRONIC DIARRHEA: Status: RESOLVED | Noted: 2024-04-03 | Resolved: 2024-04-11

## 2024-04-11 PROBLEM — G47.33 OBSTRUCTIVE SLEEP APNEA: Chronic | Status: ACTIVE | Noted: 2019-03-20

## 2024-04-11 PROBLEM — Z13.1 DIABETES MELLITUS SCREENING: Status: RESOLVED | Noted: 2024-03-18 | Resolved: 2024-04-11

## 2024-04-11 PROBLEM — Z00.00 HEALTH CARE MAINTENANCE: Status: RESOLVED | Noted: 2024-03-18 | Resolved: 2024-04-11

## 2024-04-11 PROBLEM — Z11.4 ENCOUNTER FOR SCREENING FOR HIV: Status: RESOLVED | Noted: 2024-03-18 | Resolved: 2024-04-11

## 2024-04-11 PROBLEM — R30.0 DYSURIA: Status: RESOLVED | Noted: 2024-04-03 | Resolved: 2024-04-11

## 2024-04-11 PROBLEM — G89.29 CHRONIC KNEE PAIN: Status: RESOLVED | Noted: 2024-04-03 | Resolved: 2024-04-11

## 2024-04-11 PROBLEM — M25.569 CHRONIC KNEE PAIN: Status: RESOLVED | Noted: 2024-04-03 | Resolved: 2024-04-11

## 2024-04-11 PROBLEM — Z23 IMMUNIZATION DUE: Status: RESOLVED | Noted: 2024-03-18 | Resolved: 2024-04-11

## 2024-04-11 PROCEDURE — 36415 COLL VENOUS BLD VENIPUNCTURE: CPT

## 2024-04-11 PROCEDURE — 85652 RBC SED RATE AUTOMATED: CPT

## 2024-04-11 PROCEDURE — 84165 PROTEIN E-PHORESIS SERUM: CPT | Performed by: INTERNAL MEDICINE

## 2024-04-11 PROCEDURE — 82164 ANGIOTENSIN I ENZYME TEST: CPT

## 2024-04-11 PROCEDURE — 82728 ASSAY OF FERRITIN: CPT

## 2024-04-11 PROCEDURE — 83540 ASSAY OF IRON: CPT

## 2024-04-11 PROCEDURE — 99205 OFFICE O/P NEW HI 60 MIN: CPT | Performed by: INTERNAL MEDICINE

## 2024-04-11 PROCEDURE — 86141 C-REACTIVE PROTEIN HS: CPT

## 2024-04-11 PROCEDURE — 84155 ASSAY OF PROTEIN SERUM: CPT

## 2024-04-11 PROCEDURE — 83550 IRON BINDING TEST: CPT

## 2024-04-11 PROCEDURE — 99215 OFFICE O/P EST HI 40 MIN: CPT | Performed by: INTERNAL MEDICINE

## 2024-04-11 PROCEDURE — 1036F TOBACCO NON-USER: CPT | Performed by: INTERNAL MEDICINE

## 2024-04-11 PROCEDURE — 84165 PROTEIN E-PHORESIS SERUM: CPT

## 2024-04-11 RX ORDER — CARVEDILOL 6.25 MG/1
6.25 TABLET ORAL
Qty: 60 TABLET | Refills: 11 | Status: SHIPPED | OUTPATIENT
Start: 2024-04-11 | End: 2025-04-11

## 2024-04-11 NOTE — PATIENT INSTRUCTIONS
1.  Cardiomyopathy.  Ejection fraction 25 to 30%.  BNP 3/18/2018 2024 normal at 49.  Routine labs to be drawn to assess for cardiomyopathy.  I have asked for him to have a cardiac MRI to assess for degree of scar tissue formation, and any evidence of inflammation.  I will start him on carvedilol 6.25 twice daily.  My hope is to then initiate Entresto followed by spironolactone followed by an SGLT2 inhibitor.  A sed rate, high-sensitivity CRP, SPEP and UPEP, angiotensin-converting enzyme level, iron saturation and ferritin levels will be checked.    2.  Hyperlipidemia.  4/22/2021  HDL 40.  This may need to be treated.  Will follow    3.  Hypertension.  I think his blood pressures are high.  He will get a blood cuff at home and check in 2 times a week.  Initiating GDMT will be helpful.    My plan is to have him return to see me in approximately 6 weeks.  I am hopeful that his cardiac MRI will be done by then.

## 2024-04-11 NOTE — PROGRESS NOTES
Referred by No ref. provider found    HPI I am seeing Cirilo for evaluation of an abnormal echo.  Cirilos 28.  He has been noticing higher blood pressures but has never been told he has hypertension.  He does have multiple sclerosis and is on medication for this.  An EKG was done at his primary care doctor's office which had some arrhythmias.  He was sent for an echo.  His echo was performed which revealed a severely reduced 25 to 30%.  He notices some dyspnea going up and down the stairs more so than he used to.  He is not very active although works out a little bit twice a week.  He denies chest pain or pressure he has no palpitations he has no shortness of breath other than with exertion.  No lower extremity edema no PND.    Past Medical History:  Problem List Items Addressed This Visit    None       Past Medical History:   Diagnosis Date    Multiple sclerosis (CMS/[facility]) 11/10/2022    Multiple sclerosis    Thyrotoxicosis, unspecified without thyrotoxic crisis or storm 12/09/2014    Hyperthyroidism             Past Surgical History:  He has a past surgical history that includes MR angio head wo IV contrast (3/21/2013).      Social History:  He reports that he has quit smoking. His smoking use included cigarettes. He has never used smokeless tobacco. He reports current alcohol use. He reports current drug use. Drug: Marijuana.    Family History:  No family history on file.     Allergies:  Patient has no known allergies.    Outpatient Medications:  Current Outpatient Medications   Medication Instructions    Vumerity 231 mg capsule,delayed release(DR/EC) 2 capsules, oral, 2 times daily        Last Recorded Vitals:  There were no vitals filed for this visit.    Physical Exam    Physical  Patient is alert and oriented x3.  HEENT is unremarkable mucous members are moist  Neck no JVP no bruits upstrokes are full no thyromegaly  Lungs are clear bilaterally.  No wheezing crackles or rales  Heart regular rhythm normal S1-S2  there is no S3 no murmurs are heard.  Abdomen is soft vessels are positive nontender nondistended no organomegaly no pulsatile masses  Extremities have no edema.  Distal pulses present palpable.  Neuro is grossly nonfocal  Skin has no rashes     Last Labs:  CBC -  Lab Results   Component Value Date    WBC 7.9 04/05/2024    HGB 15.5 04/05/2024    HCT 44.4 04/05/2024    MCV 90 04/05/2024     04/05/2024       CMP -  Lab Results   Component Value Date    CALCIUM 10.3 04/05/2024    PHOS 4.7 03/18/2024    PROT 7.4 04/05/2024    ALBUMIN 4.8 04/05/2024    AST 28 04/05/2024    ALT 55 (H) 04/05/2024    ALKPHOS 59 04/05/2024    BILITOT 0.4 04/05/2024       LIPID PANEL -   Lab Results   Component Value Date    CHOL 212 (H) 04/22/2021    HDL 39.8 (A) 04/22/2021    CHHDL 5.3 (A) 04/22/2021    VLDL 20 04/22/2021    TRIG 101 04/22/2021    NHDL 180 10/29/2020       RENAL FUNCTION PANEL -   Lab Results   Component Value Date    K 4.3 04/05/2024    PHOS 4.7 03/18/2024       Lab Results   Component Value Date    BNP 49 03/18/2024    HGBA1C 5.4 01/08/2022     Procedures    Echo 3/27/2024 EF 25 to 30% DDF         Assessment/Plan   1.  Cardiomyopathy.  Ejection fraction 25 to 30%.  BNP 3/18/2018 2024 normal at 49.  Routine labs to be drawn to assess for cardiomyopathy.  I have asked for him to have a cardiac MRI to assess for degree of scar tissue formation, and any evidence of inflammation.  I will start him on carvedilol 6.25 twice daily.  My hope is to then initiate Entresto followed by spironolactone followed by an SGLT2 inhibitor.  A sed rate, high-sensitivity CRP, SPEP and UPEP, angiotensin-converting enzyme level, iron saturation and ferritin levels will be checked.    2.  Hyperlipidemia.  4/22/2021  HDL 40.  This may need to be treated.  Will follow    3.  Hypertension.  I think his blood pressures are high.  He will get a blood cuff at home and check in 2 times a week.  Initiating GDMT will be helpful.    My plan is  to have him return to see me in approximately 6 weeks.  I am hopeful that his cardiac MRI will be done by then.  Lucia Houston MD     Instructions and follow up

## 2024-04-12 LAB
CRP SERPL HS-MCNC: 5.4 MG/L
ERYTHROCYTE [SEDIMENTATION RATE] IN BLOOD BY WESTERGREN METHOD: 5 MM/H (ref 0–15)
FERRITIN SERPL-MCNC: 588 NG/ML (ref 20–300)
IRON SATN MFR SERPL: 25 % (ref 25–45)
IRON SERPL-MCNC: 93 UG/DL (ref 35–150)
PROT SERPL-MCNC: 7.3 G/DL (ref 6.4–8.2)
TIBC SERPL-MCNC: 377 UG/DL (ref 240–445)
UIBC SERPL-MCNC: 284 UG/DL (ref 110–370)

## 2024-04-13 LAB — ACE SERPL-CCNC: 39 U/L (ref 16–85)

## 2024-04-15 ENCOUNTER — HOSPITAL ENCOUNTER (OUTPATIENT)
Dept: CARDIOLOGY | Facility: CLINIC | Age: 29
Discharge: HOME | End: 2024-04-15
Payer: COMMERCIAL

## 2024-04-15 DIAGNOSIS — R06.09 EXERTIONAL DYSPNEA: ICD-10-CM

## 2024-04-16 ENCOUNTER — TELEMEDICINE (OUTPATIENT)
Dept: PRIMARY CARE | Facility: CLINIC | Age: 29
End: 2024-04-16
Payer: COMMERCIAL

## 2024-04-16 DIAGNOSIS — Z76.89 ENCOUNTER FOR WEIGHT MANAGEMENT: Primary | ICD-10-CM

## 2024-04-16 LAB
ALBUMIN: 4.6 G/DL (ref 3.4–5)
ALPHA 1 GLOBULIN: 0.3 G/DL (ref 0.2–0.6)
ALPHA 2 GLOBULIN: 0.7 G/DL (ref 0.4–1.1)
BETA GLOBULIN: 0.8 G/DL (ref 0.5–1.2)
GAMMA GLOBULIN: 0.8 G/DL (ref 0.5–1.4)
PATH REVIEW-SERUM PROTEIN ELECTROPHORESIS: NORMAL
PROTEIN ELECTROPHORESIS COMMENT: NORMAL

## 2024-04-16 PROCEDURE — 99214 OFFICE O/P EST MOD 30 MIN: CPT | Performed by: FAMILY MEDICINE

## 2024-04-16 PROCEDURE — 3008F BODY MASS INDEX DOCD: CPT | Performed by: FAMILY MEDICINE

## 2024-04-16 RX ORDER — SEMAGLUTIDE 0.25 MG/.5ML
0.25 INJECTION, SOLUTION SUBCUTANEOUS
Qty: 2 ML | Refills: 1 | Status: SHIPPED | OUTPATIENT
Start: 2024-04-16 | End: 2024-06-07 | Stop reason: SDUPTHER

## 2024-04-16 NOTE — ASSESSMENT & PLAN NOTE
-The patient was recommended a gastric bypass or gastric sleeve surgery as a long-term weight management option.  At this time, he insisted to have a prescription of WEGOVY.  The medication side effects, including the increased risk of thyroid cancer and acute pancreatitis were extensively discussed.  He still requested a medication to be prescribed.  He was then started on Wegovy 0.25 mg once a week.  Proper administration of the medication was explained.  -4 weeks follow-up for reassessment recommended.  -Diet and lifestyle modification also recommended.

## 2024-04-16 NOTE — PROGRESS NOTES
Subjective   Patient ID: Cirilo Wills is a 28 y.o. male who presents for weight management.  HPI    The patient is a 29 yo AA Male with a history of HDL, hypothyroidism, prediabetes, MS, KG, low vit D, diverticulitis, here for weight management.  The patient was recently diagnosed with the cardiomyopathy associated with with severely decreased ejection fraction between 25% to 30%.  He was recommended to lose  Weight in order to help improve his overall cardiovascular risks.  He is requesting to be started on Wegovy.    A review of system was completed.  All systems were reviewed and were normal, except for the ones that are listed in the HPI.    Objective   Physical Exam    Assessment/Plan   Problem List Items Addressed This Visit       Encounter for weight management - Primary    BMI 45.0-49.9, adult (Multi)     -The patient was recommended a gastric bypass or gastric sleeve surgery as a long-term weight management option.  At this time, he insisted to have a prescription of WEGOVY.  The medication side effects, including the increased risk of thyroid cancer and acute pancreatitis were extensively discussed.  He still requested a medication to be prescribed.  He was then started on Wegovy 0.25 mg once a week.  Proper administration of the medication was explained.  -4 weeks follow-up for reassessment recommended.  -Diet and lifestyle modification also recommended.         Patient to return to office in 4 weeks for reassessment.

## 2024-04-17 ENCOUNTER — HOSPITAL ENCOUNTER (OUTPATIENT)
Dept: RESPIRATORY THERAPY | Facility: HOSPITAL | Age: 29
Discharge: HOME | End: 2024-04-17
Payer: COMMERCIAL

## 2024-04-17 DIAGNOSIS — R06.09 EXERTIONAL DYSPNEA: ICD-10-CM

## 2024-04-17 PROCEDURE — 94010 BREATHING CAPACITY TEST: CPT | Performed by: INTERNAL MEDICINE

## 2024-04-17 PROCEDURE — 94726 PLETHYSMOGRAPHY LUNG VOLUMES: CPT | Performed by: INTERNAL MEDICINE

## 2024-04-17 PROCEDURE — 94729 DIFFUSING CAPACITY: CPT | Performed by: INTERNAL MEDICINE

## 2024-04-17 PROCEDURE — 94726 PLETHYSMOGRAPHY LUNG VOLUMES: CPT

## 2024-04-20 ENCOUNTER — LAB (OUTPATIENT)
Dept: LAB | Facility: LAB | Age: 29
End: 2024-04-20
Payer: COMMERCIAL

## 2024-04-20 DIAGNOSIS — R06.09 EXERTIONAL DYSPNEA: ICD-10-CM

## 2024-04-20 DIAGNOSIS — I42.0 DILATED CARDIOMYOPATHY (MULTI): ICD-10-CM

## 2024-04-20 PROCEDURE — 84156 ASSAY OF PROTEIN URINE: CPT

## 2024-04-21 LAB — PROT UR-ACNC: 12 MG/DL (ref 5–25)

## 2024-05-02 ENCOUNTER — HOSPITAL ENCOUNTER (OUTPATIENT)
Dept: RADIOLOGY | Facility: CLINIC | Age: 29
End: 2024-05-02
Payer: COMMERCIAL

## 2024-05-05 DIAGNOSIS — G47.33 OBSTRUCTIVE SLEEP APNEA: Chronic | ICD-10-CM

## 2024-05-05 DIAGNOSIS — I42.0 DILATED CARDIOMYOPATHY (MULTI): Chronic | ICD-10-CM

## 2024-05-05 DIAGNOSIS — G35 MULTIPLE SCLEROSIS (MULTI): Primary | Chronic | ICD-10-CM

## 2024-05-20 ENCOUNTER — CLINICAL SUPPORT (OUTPATIENT)
Dept: GASTROENTEROLOGY | Facility: CLINIC | Age: 29
End: 2024-05-20
Payer: COMMERCIAL

## 2024-05-20 DIAGNOSIS — R74.8 ELEVATED LIVER ENZYMES: ICD-10-CM

## 2024-05-20 PROCEDURE — 91200 LIVER ELASTOGRAPHY: CPT | Performed by: STUDENT IN AN ORGANIZED HEALTH CARE EDUCATION/TRAINING PROGRAM

## 2024-05-21 ENCOUNTER — TELEMEDICINE (OUTPATIENT)
Dept: PHARMACY | Facility: HOSPITAL | Age: 29
End: 2024-05-21
Payer: COMMERCIAL

## 2024-05-21 DIAGNOSIS — G47.33 OBSTRUCTIVE SLEEP APNEA: Chronic | ICD-10-CM

## 2024-05-21 DIAGNOSIS — I42.0 DILATED CARDIOMYOPATHY (MULTI): Chronic | ICD-10-CM

## 2024-05-21 DIAGNOSIS — G35 MULTIPLE SCLEROSIS (MULTI): Chronic | ICD-10-CM

## 2024-05-21 NOTE — PROGRESS NOTES
Pharmacist Clinic: Weight Management    Cirilo Wills was referred to the Clinical Pharmacy Team for weight management.     Referring Provider: Blesisng Fisher MD  Problem List Items Addressed This Visit       Multiple sclerosis (Multi) (Chronic)    Obstructive sleep apnea (Chronic)    Dilated cardiomyopathy (Multi) (Chronic)    BMI 45.0-49.9, adult (Multi)    Relevant Orders    Follow Up In [facility] - Pharmacy       HISTORY OF PRESENT ILLNESS    Diet/Exercise: Has been following with diet/lifestyle modifications as recommended by YEYO Ribera-CNP Neurology    Is interested in addition of Wegovy to current lifestyle modifications for weight management    Weight  330 lb (initial)    PHARMACY ASSESSMENT  Pertinent PMH Review:  - PMH of Pancreatitis: No  - PMH of Retinopathy: No  - PMH of MTC: No    Allergies: Patient has no known allergies.     [id number] [place], [place] - [address] [place] [address] [address]  [facility] [address]  Phone: [phone] Fax: [phone]    [facility] - [place], [place] - [address]  [address]  [address]  [facility] [address]  Phone: [phone] Fax: [phone]    Affordability/Accessibility: Prior authorization required  Adherence/Organization: Good    CURRENT PHARMACOTHERAPY  - carvedilol 6.25 mg twice daily  -Vumerity 231 mg twice daily    DRUG INTERACTIONS  - No significant drug-drug interactions exist that require adjustment to therapy    LAB  Lab Results   Component Value Date    BILITOT 0.4 04/05/2024    CALCIUM 10.3 04/05/2024    CO2 26 04/05/2024     04/05/2024    CREATININE 0.92 04/05/2024    GLUCOSE 89 04/05/2024    ALKPHOS 59 04/05/2024    K 4.3 04/05/2024    PROT 7.3 04/11/2024     04/05/2024    AST 28 04/05/2024    ALT 55 (H) 04/05/2024    BUN 15 04/05/2024    ANIONGAP 16 04/05/2024    PHOS 4.7 03/18/2024    ALBUMIN 4.8 04/05/2024    EGFR >90 04/05/2024     Lab Results    Component Value Date    TRIG 101 04/22/2021    CHOL 212 (H) 04/22/2021    HDL 39.8 (A) 04/22/2021     Lab Results   Component Value Date    HGBA1C 5.4 01/08/2022       Current Outpatient Medications on File Prior to Visit   Medication Sig Dispense Refill    carvedilol (Coreg) 6.25 mg tablet Take 1 tablet (6.25 mg) by mouth 2 times a day with meals. 60 tablet 11    semaglutide, weight loss, (Wegovy) 0.25 mg/0.5 mL pen injector Inject 0.25 mg under the skin every 7 days. 2 mL 1    Vumerity 231 mg capsule,delayed release(DR/EC) TAKE 2 CAPSULES BY MOUTH 2 TIMES A DAY. 120 capsule 5     No current facility-administered medications on file prior to visit.       PATIENT EDUCATION/GOALS  -  Counseled patient on Wegovy MOA, expectations, side effects, duration of therapy, administration, and monitoring parameters.  - Advised patient that they may experience improved satiety after meals and portion sizes of meals may be reduced as doses of Wegovy increase.  - Discussed prior authorization process, will submit and discuss options once a response is given    RECOMMENDATIONS/PLAN  1. Continue diet and lifestyle modifications  2. Evaluate insurance coverage for Wegovy after PA response    Clinical Pharmacist follow up: 5/23/24 1130    Continue all meds under the continuation of care with the referring provider and clinical pharmacy team.    Meena Davey, PharmD  Clinical Pharmacy Specialist, Primary Care   [phone]    Verbal consent to manage patient's drug therapy was obtained from patient. They were informed they may decline to participate or withdraw from participation in pharmacy services at any time.              Yes

## 2024-05-23 ENCOUNTER — APPOINTMENT (OUTPATIENT)
Dept: PHARMACY | Facility: HOSPITAL | Age: 29
End: 2024-05-23
Payer: COMMERCIAL

## 2024-05-24 ENCOUNTER — OFFICE VISIT (OUTPATIENT)
Dept: GASTROENTEROLOGY | Facility: CLINIC | Age: 29
End: 2024-05-24
Payer: COMMERCIAL

## 2024-05-24 VITALS
WEIGHT: 315 LBS | HEART RATE: 118 BPM | SYSTOLIC BLOOD PRESSURE: 136 MMHG | BODY MASS INDEX: 42.66 KG/M2 | HEIGHT: 72 IN | TEMPERATURE: 98.8 F | DIASTOLIC BLOOD PRESSURE: 90 MMHG

## 2024-05-24 DIAGNOSIS — K75.81 NASH (NONALCOHOLIC STEATOHEPATITIS): Primary | ICD-10-CM

## 2024-05-24 PROCEDURE — 3008F BODY MASS INDEX DOCD: CPT | Performed by: NURSE PRACTITIONER

## 2024-05-24 PROCEDURE — 1036F TOBACCO NON-USER: CPT | Performed by: NURSE PRACTITIONER

## 2024-05-24 PROCEDURE — 99213 OFFICE O/P EST LOW 20 MIN: CPT | Performed by: NURSE PRACTITIONER

## 2024-05-24 RX ORDER — RESMETIROM 100 MG/1
100 TABLET, COATED ORAL DAILY
Qty: 30 TABLET | Refills: 5 | Status: SHIPPED | OUTPATIENT
Start: 2024-05-24

## 2024-05-24 ASSESSMENT — ENCOUNTER SYMPTOMS
COLOR CHANGE: 0
JOINT SWELLING: 0
HEADACHES: 0
DIFFICULTY URINATING: 0
HEMATURIA: 0
AGITATION: 0
UNEXPECTED WEIGHT CHANGE: 0
FEVER: 0
ABDOMINAL PAIN: 0
CONFUSION: 0
ABDOMINAL DISTENTION: 0
DIZZINESS: 0
CONSTIPATION: 0
WEAKNESS: 0
NUMBNESS: 0
NAUSEA: 0
DYSURIA: 0
DIARRHEA: 0
BRUISES/BLEEDS EASILY: 0
CHILLS: 0
TREMORS: 0
SLEEP DISTURBANCE: 0
VOMITING: 0
WHEEZING: 0
COUGH: 0
LIGHT-HEADEDNESS: 0
TROUBLE SWALLOWING: 0
SHORTNESS OF BREATH: 0
BLOOD IN STOOL: 0
VOICE CHANGE: 0
FREQUENCY: 0
APPETITE CHANGE: 0
PALPITATIONS: 0

## 2024-05-24 NOTE — PROGRESS NOTES
Subjective   Patient ID: Cirilo Wills is a 28 y.o. male who presents for FU for labs/fibroscan.    5.24.2024:  Here for FU to discuss results and plan of care.  Fibroscan confirms Stage 2 fibrosis and severe steatosis.  Overall, labs are stable with persistent mild elevation in ALT.  He is slowly trying to get back into exercising and has lost 5 lbs since his last visit.  No new symptoms.    HPI  28 year old male referred for elevated liver enzymes.   Pt has history of MS; currently on Vumerity BID for the last 4 years.  To his knowledge, his labs have only recently been elevated, primarily ALT.    03/18/2024:  ALT 55, AST 28.  07/01/2023:  ALT 55, AST 31  10/15/2021:  ALT 60, AST 31  No recent imaging.  Denies any abdominal pain, fevers, chills, nausea, vomiting, jaundice, icterus or bleeding.  His weight has been going up over the last couple of years and he admits to not being every active.  He does try to eat healthy.  He works from home for the [employer].  ETOH intake is whiskey 6oz 2-3 days per week.  No history of drug use.     Review of Systems   Constitutional:  Negative for appetite change, chills, fever and unexpected weight change.   HENT:  Negative for mouth sores, nosebleeds, trouble swallowing and voice change.    Eyes:  Negative for visual disturbance.   Respiratory:  Negative for cough, shortness of breath and wheezing.    Cardiovascular:  Negative for chest pain, palpitations and leg swelling.   Gastrointestinal:  Negative for abdominal distention, abdominal pain, blood in stool, constipation, diarrhea, nausea and vomiting.   Genitourinary:  Negative for decreased urine volume, difficulty urinating, dysuria, frequency, hematuria and urgency.   Musculoskeletal:  Negative for gait problem and joint swelling.   Skin:  Negative for color change, pallor and rash.   Neurological:  Negative for dizziness, tremors, weakness, light-headedness, numbness and headaches.   Hematological:  Does not bruise/bleed easily.    Psychiatric/Behavioral:  Negative for agitation, behavioral problems, confusion and sleep disturbance.        Objective   Physical Exam  Constitutional:       General: He is awake.      Appearance: Normal appearance. He is well-developed.   HENT:      Head: Normocephalic and atraumatic.      Right Ear: Hearing normal.      Left Ear: Hearing normal.      Nose: Nose normal.      Mouth/Throat:      Lips: Pink.      Mouth: Mucous membranes are moist.   Eyes:      General: Lids are normal.      Extraocular Movements: Extraocular movements intact.      Conjunctiva/sclera: Conjunctivae normal.      Pupils: Pupils are equal, round, and reactive to light.   Cardiovascular:      Rate and Rhythm: Normal rate and regular rhythm.      Pulses: Normal pulses.      Heart sounds: Normal heart sounds.   Pulmonary:      Effort: Pulmonary effort is normal.      Breath sounds: Normal breath sounds.   Abdominal:      General: Abdomen is flat. Bowel sounds are normal.      Palpations: Abdomen is soft.   Musculoskeletal:      Cervical back: Normal range of motion and neck supple.   Feet:      Right foot:      Skin integrity: Skin integrity normal.      Left foot:      Skin integrity: Skin integrity normal.   Skin:     General: Skin is warm.   Neurological:      General: No focal deficit present.      Mental Status: He is alert and oriented to person, place, and time.      Cranial Nerves: Cranial nerves 2-12 are intact.      Sensory: Sensation is intact.      Motor: Motor function is intact.      Coordination: Coordination is intact.      Gait: Gait is intact.   Psychiatric:         Attention and Perception: Attention and perception normal.         Mood and Affect: Mood normal.         Speech: Speech normal.         Behavior: Behavior is cooperative.         Thought Content: Thought content normal.         Cognition and Memory: Cognition normal.         Judgment: Judgment normal.         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  He has been working on resuming exercising and eating healthier.    Will start Rezdiffra at the dose of 100 mg daily  Plan to monitor closely for response and adverse events which include nausea and diarrhea     We will see the patient in 3-6 months      LISSETTE Choi 05/24/24 1:15 PM

## 2024-05-24 NOTE — ASSESSMENT & PLAN NOTE
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  He has been working on resuming exercising and eating healthier.    Will start Rezdiffra at the dose of 100 mg daily  Plan to monitor closely for response and adverse events which include nausea and diarrhea     We will see the patient in 3-6 months

## 2024-05-28 ENCOUNTER — SPECIALTY PHARMACY (OUTPATIENT)
Dept: PHARMACY | Facility: CLINIC | Age: 29
End: 2024-05-28

## 2024-05-30 ENCOUNTER — HOSPITAL ENCOUNTER (OUTPATIENT)
Dept: RADIOLOGY | Facility: HOSPITAL | Age: 29
Discharge: HOME | End: 2024-05-30
Payer: COMMERCIAL

## 2024-05-30 DIAGNOSIS — R06.09 EXERTIONAL DYSPNEA: ICD-10-CM

## 2024-05-30 DIAGNOSIS — I42.0 DILATED CARDIOMYOPATHY (MULTI): Chronic | ICD-10-CM

## 2024-05-30 PROCEDURE — 75561 CARDIAC MRI FOR MORPH W/DYE: CPT

## 2024-05-30 PROCEDURE — 75565 CARD MRI VELOC FLOW MAPPING: CPT | Performed by: STUDENT IN AN ORGANIZED HEALTH CARE EDUCATION/TRAINING PROGRAM

## 2024-05-30 PROCEDURE — 2550000001 HC RX 255 CONTRASTS: Performed by: INTERNAL MEDICINE

## 2024-05-30 PROCEDURE — A9575 INJ GADOTERATE MEGLUMI 0.1ML: HCPCS | Performed by: INTERNAL MEDICINE

## 2024-05-30 PROCEDURE — 75561 CARDIAC MRI FOR MORPH W/DYE: CPT | Performed by: STUDENT IN AN ORGANIZED HEALTH CARE EDUCATION/TRAINING PROGRAM

## 2024-05-30 RX ORDER — GADOTERATE MEGLUMINE 376.9 MG/ML
40 INJECTION INTRAVENOUS
Status: COMPLETED | OUTPATIENT
Start: 2024-05-30 | End: 2024-05-30

## 2024-05-30 RX ADMIN — GADOTERATE MEGLUMINE 40 ML: 376.9 INJECTION INTRAVENOUS at 14:51

## 2024-05-31 ENCOUNTER — TELEMEDICINE (OUTPATIENT)
Dept: PHARMACY | Facility: HOSPITAL | Age: 29
End: 2024-05-31
Payer: COMMERCIAL

## 2024-05-31 NOTE — PROGRESS NOTES
Pharmacist Clinic: Weight Management    Cirilo Wills was referred to the Clinical Pharmacy Team for weight management.     Referring Provider: Blessing Fisher MD  Problem List Items Addressed This Visit       BMI 45.0-49.9, adult (Multi)       HISTORY OF PRESENT ILLNESS    Diet/Exercise: Has been following with diet/lifestyle modifications as recommended by YEYO Ribera-CNP Neurology    Is interested in addition of Wegovy to current lifestyle modifications for weight management    Weight  330 lb (initial)    PHARMACY ASSESSMENT  Pertinent PMH Review:  - PMH of Pancreatitis: No  - PMH of Retinopathy: No  - PMH of MTC: No    Allergies: Patient has no known allergies.     [id number] - [place], [place] - [address]  [address]  [facility] [address]  Phone: [phone] Fax: [phone]    [facility] - [place], [place] - [address]  [address]  [address]  [facility] [address]  Phone: [phone] Fax: [phone]     Specialty Pharmacy  [address] [facility] [address]  Phone: [phone] Fax: [phone]    Affordability/Accessibility: Prior authorization required  Adherence/Organization: Good    CURRENT PHARMACOTHERAPY  - carvedilol 6.25 mg twice daily  -Vumerity 231 mg twice daily    DRUG INTERACTIONS  - No significant drug-drug interactions exist that require adjustment to therapy    LAB  Lab Results   Component Value Date    BILITOT 0.4 04/05/2024    CALCIUM 10.3 04/05/2024    CO2 26 04/05/2024     04/05/2024    CREATININE 0.92 04/05/2024    GLUCOSE 89 04/05/2024    ALKPHOS 59 04/05/2024    K 4.3 04/05/2024    PROT 7.3 04/11/2024     04/05/2024    AST 28 04/05/2024    ALT 55 (H) 04/05/2024    BUN 15 04/05/2024    ANIONGAP 16 04/05/2024    PHOS 4.7 03/18/2024    ALBUMIN 4.8 04/05/2024    EGFR >90 04/05/2024     Lab Results   Component Value Date    TRIG 101 04/22/2021    CHOL 212 (H) 04/22/2021    HDL  39.8 (A) 04/22/2021     Lab Results   Component Value Date    HGBA1C 5.4 01/08/2022       Current Outpatient Medications on File Prior to Visit   Medication Sig Dispense Refill    carvedilol (Coreg) 6.25 mg tablet Take 1 tablet (6.25 mg) by mouth 2 times a day with meals. 60 tablet 11    resmetirom (Rezdiffra) 100 mg tablet Take 100 mg by mouth once daily. 30 tablet 5    semaglutide, weight loss, (Wegovy) 0.25 mg/0.5 mL pen injector Inject 0.25 mg under the skin every 7 days. 2 mL 1    Vumerity 231 mg capsule,delayed release(DR/EC) TAKE 2 CAPSULES BY MOUTH 2 TIMES A DAY. 120 capsule 5     Current Facility-Administered Medications on File Prior to Visit   Medication Dose Route Frequency Provider Last Rate Last Admin    [COMPLETED] gadoterate meglumine (Dotarem) 0.5 mmol/mL contrast injection 40 mL  40 mL intravenous Once in imaging Lucia Houston MD   40 mL at 05/30/24 3406       PATIENT EDUCATION/GOALS  -  Counseled patient on Wegovy MOA, expectations, side effects, duration of therapy, administration, and monitoring parameters.  - Advised patient that they may experience improved satiety after meals and portion sizes of meals may be reduced as doses of Wegovy increase.  - Discussed prior authorization process, denied.  Is in process of appeal.     RECOMMENDATIONS/PLAN  1. Continue diet and lifestyle modifications, encouraged to ask PCP about the weight management program   2. Wegovy prior auth denied, patient in process of appeal. States he may fill and cash pay, will call if he ends up filling medication for us to schedule follow up.    Clinical Pharmacist follow up: as needed    Continue all meds under the continuation of care with the referring provider and clinical pharmacy team.    Meena Davey, PharmD  Clinical Pharmacy Specialist, Primary Care   [phone]    Verbal consent to manage patient's drug therapy was obtained from patient. They were informed they may decline to participate or withdraw from  participation in pharmacy services at any time.

## 2024-06-07 ENCOUNTER — OFFICE VISIT (OUTPATIENT)
Dept: PRIMARY CARE | Facility: CLINIC | Age: 29
End: 2024-06-07
Payer: COMMERCIAL

## 2024-06-07 ENCOUNTER — PHARMACY VISIT (OUTPATIENT)
Dept: PHARMACY | Facility: CLINIC | Age: 29
End: 2024-06-07
Payer: MEDICARE

## 2024-06-07 VITALS
BODY MASS INDEX: 43.67 KG/M2 | SYSTOLIC BLOOD PRESSURE: 136 MMHG | HEART RATE: 94 BPM | WEIGHT: 315 LBS | DIASTOLIC BLOOD PRESSURE: 92 MMHG | TEMPERATURE: 98.4 F

## 2024-06-07 DIAGNOSIS — I42.0 DILATED CARDIOMYOPATHY (MULTI): Primary | Chronic | ICD-10-CM

## 2024-06-07 DIAGNOSIS — Z13.1 DIABETES MELLITUS SCREENING: ICD-10-CM

## 2024-06-07 DIAGNOSIS — Z23 IMMUNIZATION DUE: ICD-10-CM

## 2024-06-07 DIAGNOSIS — Z76.89 ENCOUNTER FOR WEIGHT MANAGEMENT: ICD-10-CM

## 2024-06-07 DIAGNOSIS — I50.9 CONGESTIVE HEART FAILURE, UNSPECIFIED HF CHRONICITY, UNSPECIFIED HEART FAILURE TYPE (MULTI): ICD-10-CM

## 2024-06-07 PROCEDURE — 1036F TOBACCO NON-USER: CPT | Performed by: FAMILY MEDICINE

## 2024-06-07 PROCEDURE — 90471 IMMUNIZATION ADMIN: CPT | Performed by: FAMILY MEDICINE

## 2024-06-07 PROCEDURE — 90472 IMMUNIZATION ADMIN EACH ADD: CPT | Performed by: FAMILY MEDICINE

## 2024-06-07 PROCEDURE — 99214 OFFICE O/P EST MOD 30 MIN: CPT | Performed by: FAMILY MEDICINE

## 2024-06-07 PROCEDURE — RXMED WILLOW AMBULATORY MEDICATION CHARGE

## 2024-06-07 PROCEDURE — 3008F BODY MASS INDEX DOCD: CPT | Performed by: FAMILY MEDICINE

## 2024-06-07 PROCEDURE — 90677 PCV20 VACCINE IM: CPT | Performed by: FAMILY MEDICINE

## 2024-06-07 PROCEDURE — 90715 TDAP VACCINE 7 YRS/> IM: CPT | Performed by: FAMILY MEDICINE

## 2024-06-07 RX ORDER — SEMAGLUTIDE 0.25 MG/.5ML
0.25 INJECTION, SOLUTION SUBCUTANEOUS
Qty: 2 ML | Refills: 5 | Status: SHIPPED | OUTPATIENT
Start: 2024-06-07

## 2024-06-07 ASSESSMENT — PAIN SCALES - GENERAL: PAINLEVEL: 0-NO PAIN

## 2024-06-07 NOTE — PROGRESS NOTES
Subjective   Patient ID: Cirilo Wills is a 28 y.o. male who presents for Follow-up.  HPI  The patient is a 29 yo AA Male with a history of HDL, hypothyroidism, prediabetes, MS, KG, low vit D, diverticulitis, cardiomyopathy associated with with severely decreased ejection fraction between 25% to 30%.  The patient is still complaining about his exertional dyspnea despite his current treatment.  He was recommended to lose weight in order to help improve his overall cardiovascular risks.  He was started on started on Wegovy but his pharmacy has been out of supplies.     A review of system was completed.  All systems were reviewed and were normal, except for the ones that are listed in the HPI.    Objective   Physical Exam  Constitutional:       Appearance: Normal appearance.   HENT:      Head: Normocephalic and atraumatic.      Right Ear: Tympanic membrane, ear canal and external ear normal.      Left Ear: Tympanic membrane, ear canal and external ear normal.      Nose: Nose normal.      Mouth/Throat:      Mouth: Mucous membranes are moist.      Pharynx: Oropharynx is clear.   Eyes:      Extraocular Movements: Extraocular movements intact.      Conjunctiva/sclera: Conjunctivae normal.      Pupils: Pupils are equal, round, and reactive to light.   Cardiovascular:      Rate and Rhythm: Normal rate and regular rhythm.      Pulses: Normal pulses.   Pulmonary:      Effort: Pulmonary effort is normal.      Breath sounds: Normal breath sounds.   Abdominal:      General: Abdomen is flat. Bowel sounds are normal.      Palpations: Abdomen is soft.   Musculoskeletal:         General: Normal range of motion.      Cervical back: Normal range of motion and neck supple.   Skin:     General: Skin is warm.   Neurological:      General: No focal deficit present.      Mental Status: He is alert and oriented to person, place, and time. Mental status is at baseline.   Psychiatric:         Mood and Affect: Mood normal.         Behavior:  Behavior normal.         Thought Content: Thought content normal.         Judgment: Judgment normal.     Assessment/Plan   Problem List Items Addressed This Visit       Dilated cardiomyopathy (Multi) - Primary (Chronic)     -Not controlled.  Patient is still very tired despite his current regiment.  -Cardiologist referral done today.  -blood test done.          Relevant Orders    Referral to Cardiology    Lipid Panel    Encounter for weight management     -Wegovy sent to the  pharmacy today.          Relevant Medications    semaglutide, weight loss, (Wegovy) 0.25 mg/0.5 mL pen injector    BMI 45.0-49.9, adult (Multi)     -Wegovy prescription is pending.          Relevant Medications    semaglutide, weight loss, (Wegovy) 0.25 mg/0.5 mL pen injector    Other Relevant Orders    Referral to Cardiology    Congestive heart failure (Multi)    Relevant Orders    Referral to Cardiology    Comprehensive Metabolic Panel    Lipid Panel    Hemoglobin A1C    Diabetes mellitus screening    Relevant Orders    Hemoglobin A1C    Immunization due      Patient to return to office in 3 months for reassessment.

## 2024-06-07 NOTE — ASSESSMENT & PLAN NOTE
-Not controlled.  Patient is still very tired despite his current regiment.  -Cardiologist referral done today.  -blood test done.

## 2024-06-08 ENCOUNTER — LAB (OUTPATIENT)
Dept: LAB | Facility: LAB | Age: 29
End: 2024-06-08
Payer: COMMERCIAL

## 2024-06-08 DIAGNOSIS — E78.2 COMBINED HYPERLIPIDEMIA: Chronic | ICD-10-CM

## 2024-06-08 DIAGNOSIS — I42.0 DILATED CARDIOMYOPATHY (MULTI): Chronic | ICD-10-CM

## 2024-06-08 DIAGNOSIS — Z13.1 DIABETES MELLITUS SCREENING: ICD-10-CM

## 2024-06-08 DIAGNOSIS — I50.9 CONGESTIVE HEART FAILURE, UNSPECIFIED HF CHRONICITY, UNSPECIFIED HEART FAILURE TYPE (MULTI): ICD-10-CM

## 2024-06-08 PROCEDURE — 36415 COLL VENOUS BLD VENIPUNCTURE: CPT

## 2024-06-08 PROCEDURE — 83036 HEMOGLOBIN GLYCOSYLATED A1C: CPT

## 2024-06-08 PROCEDURE — 80053 COMPREHEN METABOLIC PANEL: CPT

## 2024-06-08 PROCEDURE — 80061 LIPID PANEL: CPT

## 2024-06-08 PROCEDURE — 82977 ASSAY OF GGT: CPT

## 2024-06-09 LAB
ALBUMIN SERPL BCP-MCNC: 4.7 G/DL (ref 3.4–5)
ALP SERPL-CCNC: 43 U/L (ref 33–120)
ALT SERPL W P-5'-P-CCNC: 49 U/L (ref 10–52)
ANION GAP SERPL CALC-SCNC: 11 MMOL/L (ref 10–20)
AST SERPL W P-5'-P-CCNC: 23 U/L (ref 9–39)
BILIRUB SERPL-MCNC: 0.7 MG/DL (ref 0–1.2)
BUN SERPL-MCNC: 12 MG/DL (ref 6–23)
CALCIUM SERPL-MCNC: 10 MG/DL (ref 8.6–10.6)
CHLORIDE SERPL-SCNC: 104 MMOL/L (ref 98–107)
CHOLEST SERPL-MCNC: 211 MG/DL (ref 0–199)
CHOLESTEROL/HDL RATIO: 5.2
CO2 SERPL-SCNC: 29 MMOL/L (ref 21–32)
CREAT SERPL-MCNC: 0.9 MG/DL (ref 0.5–1.3)
EGFRCR SERPLBLD CKD-EPI 2021: >90 ML/MIN/1.73M*2
EST. AVERAGE GLUCOSE BLD GHB EST-MCNC: 105 MG/DL
GLUCOSE SERPL-MCNC: 106 MG/DL (ref 74–99)
HBA1C MFR BLD: 5.3 %
HDLC SERPL-MCNC: 40.8 MG/DL
LDLC SERPL CALC-MCNC: 137 MG/DL
NON HDL CHOLESTEROL: 170 MG/DL (ref 0–149)
POTASSIUM SERPL-SCNC: 4.2 MMOL/L (ref 3.5–5.3)
PROT SERPL-MCNC: 7.3 G/DL (ref 6.4–8.2)
SODIUM SERPL-SCNC: 140 MMOL/L (ref 136–145)
TRIGL SERPL-MCNC: 168 MG/DL (ref 0–149)
VLDL: 34 MG/DL (ref 0–40)

## 2024-06-11 ENCOUNTER — TELEPHONE (OUTPATIENT)
Dept: GASTROENTEROLOGY | Facility: HOSPITAL | Age: 29
End: 2024-06-11
Payer: COMMERCIAL

## 2024-06-13 DIAGNOSIS — E78.2 COMBINED HYPERLIPIDEMIA: Primary | Chronic | ICD-10-CM

## 2024-06-13 LAB — GGT SERPL-CCNC: 41 U/L (ref 5–64)

## 2024-06-13 RX ORDER — ATORVASTATIN CALCIUM 40 MG/1
40 TABLET, FILM COATED ORAL DAILY
Qty: 90 TABLET | Refills: 3 | Status: SHIPPED | OUTPATIENT
Start: 2024-06-13 | End: 2025-06-13

## 2024-06-27 ENCOUNTER — OFFICE VISIT (OUTPATIENT)
Dept: NEUROLOGY | Facility: CLINIC | Age: 29
End: 2024-06-27
Payer: COMMERCIAL

## 2024-06-27 VITALS
DIASTOLIC BLOOD PRESSURE: 91 MMHG | BODY MASS INDEX: 43.67 KG/M2 | WEIGHT: 315 LBS | HEART RATE: 90 BPM | RESPIRATION RATE: 18 BRPM | SYSTOLIC BLOOD PRESSURE: 139 MMHG

## 2024-06-27 DIAGNOSIS — G35 MULTIPLE SCLEROSIS (MULTI): Primary | ICD-10-CM

## 2024-06-27 PROCEDURE — 1036F TOBACCO NON-USER: CPT | Performed by: NURSE PRACTITIONER

## 2024-06-27 PROCEDURE — 3008F BODY MASS INDEX DOCD: CPT | Performed by: NURSE PRACTITIONER

## 2024-06-27 PROCEDURE — 99215 OFFICE O/P EST HI 40 MIN: CPT | Performed by: NURSE PRACTITIONER

## 2024-06-27 ASSESSMENT — VISUAL ACUITY: VA_NORMAL: 1

## 2024-06-27 ASSESSMENT — PAIN SCALES - GENERAL: PAINLEVEL: 0-NO PAIN

## 2024-06-27 NOTE — PROGRESS NOTES
Onset: 03/13  Diagnosis of MS: 07/14  Disease course at onset: RR  Current disease course: RR  Previous disease therapies: IVMP in 2013, Tecfidera since end of 8/14 stopped d/t insurance 2021.  Current disease therapies: Vumerity   Most recent MRI brain: 10/2023- stable  Most recent MRI cervical spine: 03/11/15  Most recent MRI thoracic spine: not done  CSF: 03/2013  JCV serology and date: not done  CBC:  WBC- 9.1  ALC-1.58    Subjective   Cirilo Wills is a 28 y.o. right handed male here for a follow up if his MS, he takes Vumerity and tolerating well. He reports no new MS symptoms, but had a cardiac MRI done after having trouble breathing when climbing stairs and abnormal hepatic labs which his PCP has scheduled him to see a cardiologist Monday.  Last MRI of the brain was    ROS  No bowel or bladder issues, stable gait, no recent infections, and no trouble swallowing.    Objective   Neurological Exam  Mental Status  Alert. Oriented to person, place, time and situation. Oriented to person, place, and time. Memory is normal. Speech is normal. Language is fluent with no aphasia. Attention and concentration are normal.    Cranial Nerves  CN I: Sense of smell is normal.  CN II: Right visual acuity: Normal. Left visual acuity: Normal. Right normal visual field. Left normal visual field. Right funduscopic exam: disc intact. Left funduscopic exam: disc intact.  CN III, IV, VI: Extraocular movements intact bilaterally. Normal lids and orbits bilaterally. Pupils equal round and reactive to light bilaterally.  CN V: Facial sensation is normal.  Right: Decreased sensation or right side of forehead and cheek..  CN VII: Full and symmetric facial movement.  CN VIII: Hearing is normal.  CN IX, X: Palate elevates symmetrically  CN XI: Shoulder shrug strength is normal.  CN XII: Tongue midline without atrophy or fasciculations.    Motor  Normal muscle bulk throughout. Normal muscle tone. Strength is 5/5 throughout all four  extremities.    Sensory  Light touch is normal in upper and lower extremities.     Reflexes                                            Right                      Left  Brachioradialis                    2+                         2+  Biceps                                 2+                         2+  Triceps                                2+                         2+  Patellar                                1+                         2+   Right palmar grasp present. Left palmar grasp present.    Coordination    Finger-to-nose, rapid alternating movements and heel-to-shin normal bilaterally without dysmetria.  9 hole peg test: Right hand 14.5 sec. Left hand 16 sec..    Gait  Casual gait is normal including stance, stride, and arm swing.  5.    Physical Exam  Constitutional:       Appearance: Normal appearance.   HENT:      Head: Normocephalic.      Right Ear: Tympanic membrane normal.      Left Ear: Tympanic membrane normal.      Nose: Nose normal.      Mouth/Throat:      Mouth: Mucous membranes are moist.   Eyes:      General: Lids are normal.      Extraocular Movements: Extraocular movements intact.      Pupils: Pupils are equal, round, and reactive to light.   Pulmonary:      Effort: Pulmonary effort is normal.   Abdominal:      General: Abdomen is flat.   Musculoskeletal:         General: Normal range of motion.      Cervical back: Full passive range of motion without pain and normal range of motion.   Skin:     General: Skin is warm and dry.   Neurological:      Mental Status: He is alert and oriented to person, place, and time.      Motor: Motor strength is normal.     Coordination: Coordination is intact.      Deep Tendon Reflexes:      Reflex Scores:       Tricep reflexes are 2+ on the right side and 2+ on the left side.       Bicep reflexes are 2+ on the right side and 2+ on the left side.       Brachioradialis reflexes are 2+ on the right side and 2+ on the left side.       Patellar reflexes are 1+ on  the right side and 2+ on the left side.  Psychiatric:         Attention and Perception: Attention normal.         Mood and Affect: Mood normal.         Speech: Speech normal.         Behavior: Behavior is cooperative.         Cognition and Memory: Cognition and memory normal.         Judgment: Judgment normal.     Provider Impression  Cirilo Wills is a 28 y.o. right handed male here for a follow up if his MS, he takes Vumerity and tolerating well. Last MRI of the brain was Oct. 2023 and stable.    We discussed the importance of living healthy life style with diet and exercise and the importance of V-D in patient's with MS.    The total face to face appointment was 45 minutes and more than 50% of the visit was spent counseling and coordination of care.    personally reviewed laboratory, radiographic, and medical studies which were pertinent for today's visit.    Plan  - Continue Vumerity.  - MRI Oct. 2024.  - Follow up Cardiology.  - Follow up here in 6 mo.

## 2024-06-28 ENCOUNTER — PATIENT MESSAGE (OUTPATIENT)
Dept: PRIMARY CARE | Facility: CLINIC | Age: 29
End: 2024-06-28
Payer: COMMERCIAL

## 2024-06-28 DIAGNOSIS — I42.0 DILATED CARDIOMYOPATHY (MULTI): Primary | Chronic | ICD-10-CM

## 2024-06-30 RX ORDER — SEMAGLUTIDE 0.5 MG/.5ML
0.5 INJECTION, SOLUTION SUBCUTANEOUS
Qty: 2 ML | Refills: 2 | Status: SHIPPED | OUTPATIENT
Start: 2024-06-30 | End: 2024-07-22

## 2024-06-30 RX ORDER — SEMAGLUTIDE 0.5 MG/.5ML
0.5 INJECTION, SOLUTION SUBCUTANEOUS
Qty: 2 ML | Refills: 0 | Status: SHIPPED | OUTPATIENT
Start: 2024-06-30 | End: 2024-06-30 | Stop reason: SDUPTHER

## 2024-07-01 ENCOUNTER — PHARMACY VISIT (OUTPATIENT)
Dept: PHARMACY | Facility: CLINIC | Age: 29
End: 2024-07-01
Payer: MEDICARE

## 2024-07-01 ENCOUNTER — OFFICE VISIT (OUTPATIENT)
Dept: CARDIOLOGY | Facility: CLINIC | Age: 29
End: 2024-07-01
Payer: COMMERCIAL

## 2024-07-01 ENCOUNTER — ANCILLARY PROCEDURE (OUTPATIENT)
Dept: CARDIOLOGY | Facility: CLINIC | Age: 29
End: 2024-07-01
Payer: COMMERCIAL

## 2024-07-01 VITALS
HEIGHT: 72 IN | DIASTOLIC BLOOD PRESSURE: 88 MMHG | BODY MASS INDEX: 42.66 KG/M2 | HEART RATE: 98 BPM | OXYGEN SATURATION: 99 % | WEIGHT: 315 LBS | SYSTOLIC BLOOD PRESSURE: 144 MMHG

## 2024-07-01 DIAGNOSIS — R00.2 PALPITATIONS: ICD-10-CM

## 2024-07-01 DIAGNOSIS — I42.0 DILATED CARDIOMYOPATHY (MULTI): Chronic | ICD-10-CM

## 2024-07-01 DIAGNOSIS — R06.09 EXERTIONAL DYSPNEA: ICD-10-CM

## 2024-07-01 DIAGNOSIS — I50.9 CONGESTIVE HEART FAILURE, UNSPECIFIED HF CHRONICITY, UNSPECIFIED HEART FAILURE TYPE (MULTI): ICD-10-CM

## 2024-07-01 DIAGNOSIS — R00.2 PALPITATIONS: Primary | ICD-10-CM

## 2024-07-01 DIAGNOSIS — R29.818 SUSPECTED SLEEP APNEA: ICD-10-CM

## 2024-07-01 LAB — BODY SURFACE AREA: 2.72 M2

## 2024-07-01 PROCEDURE — 3008F BODY MASS INDEX DOCD: CPT | Performed by: STUDENT IN AN ORGANIZED HEALTH CARE EDUCATION/TRAINING PROGRAM

## 2024-07-01 PROCEDURE — 93225 XTRNL ECG REC<48 HRS REC: CPT

## 2024-07-01 PROCEDURE — 1036F TOBACCO NON-USER: CPT | Performed by: STUDENT IN AN ORGANIZED HEALTH CARE EDUCATION/TRAINING PROGRAM

## 2024-07-01 PROCEDURE — 93005 ELECTROCARDIOGRAM TRACING: CPT | Performed by: STUDENT IN AN ORGANIZED HEALTH CARE EDUCATION/TRAINING PROGRAM

## 2024-07-01 PROCEDURE — G2211 COMPLEX E/M VISIT ADD ON: HCPCS | Performed by: STUDENT IN AN ORGANIZED HEALTH CARE EDUCATION/TRAINING PROGRAM

## 2024-07-01 PROCEDURE — RXMED WILLOW AMBULATORY MEDICATION CHARGE

## 2024-07-01 PROCEDURE — 99205 OFFICE O/P NEW HI 60 MIN: CPT | Performed by: STUDENT IN AN ORGANIZED HEALTH CARE EDUCATION/TRAINING PROGRAM

## 2024-07-01 RX ORDER — DAPAGLIFLOZIN 10 MG/1
10 TABLET, FILM COATED ORAL DAILY
Qty: 30 TABLET | Refills: 11 | Status: SHIPPED | OUTPATIENT
Start: 2024-07-01 | End: 2025-07-01

## 2024-07-01 RX ORDER — CARVEDILOL 6.25 MG/1
6.25 TABLET ORAL
Qty: 60 TABLET | Refills: 11 | Status: SHIPPED | OUTPATIENT
Start: 2024-07-01 | End: 2025-07-01

## 2024-07-01 RX ORDER — SPIRONOLACTONE 25 MG/1
25 TABLET ORAL DAILY
Qty: 30 TABLET | Refills: 11 | Status: SHIPPED | OUTPATIENT
Start: 2024-07-01 | End: 2025-07-01

## 2024-07-01 ASSESSMENT — ENCOUNTER SYMPTOMS
HEMATURIA: 0
COUGH: 0
MEMORY LOSS: 0
ALTERED MENTAL STATUS: 0
SYNCOPE: 0
DECREASED APPETITE: 0
HEMATEMESIS: 0
PALPITATIONS: 0
BRUISES/BLEEDS EASILY: 0
OCCASIONAL FEELINGS OF UNSTEADINESS: 0
DYSPNEA ON EXERTION: 1
SHORTNESS OF BREATH: 0
WEIGHT GAIN: 0
PARESTHESIAS: 1
NEAR-SYNCOPE: 0
ORTHOPNEA: 0
PND: 0
WEIGHT LOSS: 0
HEMATOCHEZIA: 0

## 2024-07-01 NOTE — PROGRESS NOTES
"Referring Clinician: Dr. Zackery Tabares  Cardiologist: Dr Houston  Accompanied by: alone     HPI:     28 y.o. [facility]  who presents for advanced heart failure care.  My final recommendations will be communicated back to the requesting clinician  by way of shared Medical record.  Review of the electronic medical record shows a past medical history significant for hyperlipidemia, hypertension, multiple sclerosis (quiescent, no flares since ~2015), hypothyroidism, untreated obstructive sleep apnea, obesity with BMI 44 kg/m² maintained on semaglutide.  He has a history of HFrEF and on TTE 3/2024 LVEF 25-30 % LVIDD 5.5 cm, normal right ventricular systolic function.  On cardiac MRI (NOT STRESS EVALUATED), he had LVEF 30%, normal right ventricular systolic function.  Myocardium consistent with NICM, and possible evidence of past myocarditis.  He has a history of having had mild COVIUD May 2019 which did not require specific therapeutics, or hospitalization.  GFR 6/2024 > 90  Negative autoimmune screen 4/2024  For HFrEF he is maintained on carvedilol 6.25 mg twice daily    Mr. Wills reports that he was in good health until 12/2023 when he noted exertional fatigue.  His tests after this revealed his low ejection fraction.  He notes that prior to 12/2023 he was very active and went to the gym 3 to 4 days a week without exertional symptoms.  Symptomatically he denies chest pain but continues to struggle with exertional dyspnea and fatigue.  His exercise capacity on a flat surface is unlimited but he does struggle with dyspnea if he tries to walk at a \"quicker pace\".  He can climb 12 stairs but is quite dyspneic at the top, since 12/2023.    He denies orthopnea, PND, leg swelling, abdominal bloating, syncope, presyncope.  He has had palpitations once or twice a week, and this started early 2024.    He is fully adherent with all prescribed medications.    He has never been hospitalized with heart failure, or cardiac " "complaints.    Surgical Hx:  - Nil    Social Hx:  - Smoking- nil  - THC vape pen use since 2022, uses  3-4 times a week  - ETOH- now 1 glass whisky a week, previously used 3-4 glasses of whiskey a week  - Illicit drugs- never   - Lives with brother, and parents.  Feels safe at home    Family Hx:  Specifically, there is no family history of  CAD, heart failure, ICD, PPM, LVAD, OHT, arrhythmias,  or sudden cardiac death.    Brother-\"weak heart beat\" as a child ( now 31 years, appears well)  Cousin- had multiple heart surgeries as a child , now is well ( now  30 years)  Maternal gfather- CVA in his 60s ( smoker\"    Medication reconciliation completed, see below.     Medication Documentation Review Audit       Reviewed by Jocelyn Broderick RN (Registered Nurse) on 07/01/24 at 1310      Medication Order Taking? Sig Documenting Provider Last Dose Status   atorvastatin (Lipitor) 40 mg tablet [id number] Yes Take 1 tablet (40 mg) by mouth once daily. Blessing Fisher MD Taking Active   carvedilol (Coreg) 6.25 mg tablet [id number] Yes Take 1 tablet (6.25 mg) by mouth 2 times a day with meals. Lucia Houston MD Taking Active   resmetirom (Rezdiffra) 100 mg tablet [id number] Yes Take 100 mg by mouth once daily. Cheyanne Cali APRN-CNP Taking Active     Discontinued 06/30/24 1323     Discontinued 06/30/24 1323   semaglutide, weight loss, (Wegovy) 0.5 mg/0.5 mL pen injector [id number] Yes Inject 0.5 mg under the skin 1 (one) time per week for 4 doses. Blessing Fisher MD Taking Active   Vumerity 231 mg capsule,delayed release(DR/EC) [id number] Yes TAKE 2 CAPSULES BY MOUTH 2 TIMES A DAY. Dunia Whitmore, APRN-CNP Taking Active                   Review of Systems   Constitutional: Negative for decreased appetite, weight gain and weight loss.   HENT:  Negative for hearing loss.    Eyes:  Negative for visual disturbance.   Cardiovascular:  Positive for dyspnea on exertion. Negative for chest pain, leg swelling, near-syncope, " orthopnea, palpitations, paroxysmal nocturnal dyspnea and syncope.   Respiratory:  Negative for cough and shortness of breath.    Hematologic/Lymphatic: Does not bruise/bleed easily.   Skin:  Negative for rash.   Musculoskeletal:  Negative for joint pain.   Gastrointestinal:  Negative for hematemesis, hematochezia and melena.   Genitourinary:  Negative for hematuria.   Neurological:  Positive for paresthesias.   Psychiatric/Behavioral:  Negative for altered mental status and memory loss.         Investigations:    The electronic medical record has been reviewed by me for salient history. All cardiovascular imaging and testing available in the electronic medical record, and [facility] has been reviewed. The most recent ECG (7/1/2024) has been reviewed independently by me.     7/1/2024 ECG: NSR, PVC, QRS 90 ms    Visit Vitals  /88   Pulse 98   Ht 1.829 m (6')   Wt 146 kg (322 lb)   SpO2 99%   BMI 43.67 kg/m²   Smoking Status Former   BSA 2.72 m²      On examination:    Very pleasant obese young -American man in no apparent CP or painful distress  Well groomed   Neck: No JVD or HJR  CVS: HS 1,2.   Few ectopic beats.  No resonant added sounds  Resp: CTA bilaterally. Percussion note   Abdomen: Obese, SNT, BS wnl  Extremities: No pedal oedema  Skin: warm and dry  CNS: AO x 4, no gross deficits      Lab Results   Component Value Date    ASMAB Negative 04/05/2024    MITOAB Negative 04/05/2024     Lab Results   Component Value Date    WBC 7.9 04/05/2024    HGB 15.5 04/05/2024    HCT 44.4 04/05/2024    MCV 90 04/05/2024     04/05/2024       Chemistry    Lab Results   Component Value Date/Time     06/08/2024 0843    K 4.2 06/08/2024 0843     06/08/2024 0843    CO2 29 06/08/2024 0843    BUN 12 06/08/2024 0843    CREATININE 0.90 06/08/2024 0843    Lab Results   Component Value Date/Time    CALCIUM 10.0 06/08/2024 0843    ALKPHOS 43 06/08/2024 0843    AST 23 06/08/2024 0843    ALT 49 06/08/2024 0843     BILITOT 0.7 06/08/2024 0843      GFR 6/2024:  > 90     Transthoracic Echo (TTE) Complete    Result Date: 3/28/2024   [facility], [address], [address], [place], [place] [address]                  Tel [phone] and Fax [phone] TRANSTHORACIC ECHOCARDIOGRAM REPORT  Patient Name:      ARTURO GLASS         Noah Physician:    [id number] Lucia Houston MD Study Date:        3/27/2024            Ordering Provider:    [id number] PETER FUNK MRN/PID:           [id number]             Fellow: Accession#:        [id number]         Nurse:                Juanita Oneill RN Date of Birth/Age: 1995 / 28 years Sonographer:          Italo Bullock RDCS Gender:            M                    Additional Staff: Height:            185.42 cm            Admit Date: Weight:            150.59 kg            Admission Status:     Outpatient BSA / BMI:         2.67 m2 / 43.80      Encounter#:           [id number]                    kg/m2                                         Department Location:  [facility] Blood Pressure: 134 /81 mmHg Study Type:    TRANSTHORACIC ECHO (TTE) COMPLETE Diagnosis/ICD: Other forms of dyspnea-R06.09 Indication:    JIMÉNEZ CPT Code:      Echo Complete w Full Doppler-10440 Patient History: Pertinent History: Abnormal EKG, JIMÉNEZ, HLD, DM, Hypothyroidism, KG, Family hx of                    heart disease. Study Detail: The following Echo studies were performed: 2D, M-Mode, Doppler and               color flow. Technically challenging study due to prominent lung               artifact and body habitus. Definity used as a contrast agent for               endocardial border definition. Total contrast used for this               procedure was 3.0 mL via IV push.  PHYSICIAN INTERPRETATION: Left Ventricle: The left ventricular systolic function is severely decreased, with an  estimated ejection fraction of 25-30%. There are no regional wall motion abnormalities. The left ventricular cavity size is normal. Spectral Doppler shows an impaired relaxation pattern of left ventricular diastolic filling. Left Atrium: The left atrium is mildly dilated. Right Ventricle: The right ventricle is normal in size. There is normal right ventricular global systolic function. Right Atrium: The right atrium is normal in size. Aortic Valve: The aortic valve was not well visualized. There is no evidence of aortic valve regurgitation. The peak instantaneous gradient of the aortic valve is 6.0 mmHg. The mean gradient of the aortic valve is 3.8 mmHg. Mitral Valve: The mitral valve is normal in structure. There is trace mitral valve regurgitation. Tricuspid Valve: The tricuspid valve is structurally normal. There is trace to mild tricuspid regurgitation. Pulmonic Valve: The pulmonic valve is not well visualized. There is no indication of pulmonic valve regurgitation. Pericardium: There is no pericardial effusion noted. Aorta: The aortic root is normal.  CONCLUSIONS:  1. Left ventricular systolic function is severely decreased with a 25-30% estimated ejection fraction.  2. Spectral Doppler shows an impaired relaxation pattern of left ventricular diastolic filling. QUANTITATIVE DATA SUMMARY: 2D MEASUREMENTS:                          Normal Ranges: LAs:           4.41 cm   (2.7-4.0cm) RVIDd:         2.73 cm   (0.9-3.6cm) IVSd:          1.09 cm   (0.6-1.1cm) LVPWd:         0.90 cm   (0.6-1.1cm) LVIDd:         5.55 cm   (3.9-5.9cm) LVIDs:         4.36 cm LV Mass Index: 80.5 g/m2 LV % FS        21.4 % LA VOLUME:                               Normal Ranges: LA Vol A4C:        91.1 ml    (22+/-6mL/m2) LA Vol A2C:        109.8 ml LA Vol BP:         102.4 ml LA Vol Index A4C:  34.1 ml/m2 LA Vol Index A2C:  41.1 ml/m2 LA Vol Index BP:   38.4 ml/m2 LA Area A4C:       26.4 cm2 LA Area A2C:       28.3 cm2 LA Major Axis A4C:  6.5 cm LA Major Axis A2C: 6.2 cm LA Vol A4C:        79.9 ml LA Vol A2C:        104.7 ml RA VOLUME BY A/L METHOD:                               Normal Ranges: RA Vol A4C:        60.0 ml    (8.3-19.5ml) RA Vol Index A4C:  22.5 ml/m2 RA Area A4C:       18.4 cm2 RA Major Axis A4C: 4.8 cm AORTA MEASUREMENTS:                    Normal Ranges: Asc Ao, d: 3.00 cm (2.1-3.4cm) LV SYSTOLIC FUNCTION BY 2D PLANIMETRY (MOD):                     Normal Ranges: EF-A4C View: 29.1 % (>=55%) EF-A2C View: 30.0 % EF-Biplane:  29.6 % LV DIASTOLIC FUNCTION:                            Normal Ranges: MV Peak E:      0.98 m/s   (0.7-1.2 m/s) MV Peak A:      0.66 m/s   (0.42-0.7 m/s) E/A Ratio:      1.50       (1.0-2.2) MV e'           0.11 m/s   (>8.0) MV lateral e'   0.13 m/s MV medial e'    0.08 m/s E/e' Ratio:     8.95       (<8.0) PulmV Sys Coleman:  55.09 cm/s PulmV Ocampo Coleman: 46.63 cm/s PulmV S/D Coleman:  1.18 MITRAL VALVE:                 Normal Ranges: MV DT: 104 msec (150-240msec) AORTIC VALVE:                                   Normal Ranges: AoV Vmax:                1.22 m/s (<=1.7m/s) AoV Peak P.0 mmHg (<20mmHg) AoV Mean PG:             3.8 mmHg (1.7-11.5mmHg) LVOT Max Coleman:            0.70 m/s (<=1.1m/s) AoV VTI:                 21.14 cm (18-25cm) LVOT VTI:                11.92 cm LVOT Diameter:           2.01 cm  (1.8-2.4cm) AoV Area, VTI:           1.79 cm2 (2.5-5.5cm2) AoV Area,Vmax:           1.82 cm2 (2.5-4.5cm2) AoV Dimensionless Index: 0.56  RIGHT VENTRICLE: RV Basal 3.90 cm RV Mid   2.70 cm RV Major 8.1 cm TAPSE:   16.9 mm RV s'    0.18 m/s PULMONIC VALVE:                      Normal Ranges: PV Max Coleman: 1.1 m/s  (0.6-0.9m/s) PV Max P.4 mmHg Pulmonary Veins: PulmV Ocampo Coleman: 46.63 cm/s PulmV S/D Coleman:  1.18 PulmV Sys Coleman:  55.09 cm/s  [id number] Lucia Houston MD Electronically signed on 3/28/2024 at 7:09:31 PM  ** Final (Updated) **      IMPRESSION:    28 y.o. [facility]  who presents for advanced heart failure  care.  My final recommendations will be communicated back to the requesting clinician  by way of shared Medical record.  Review of the electronic medical record shows a past medical history significant for hyperlipidemia, hypertension, multiple sclerosis (quiescent, no flares since ~2015), hypothyroidism, untreated obstructive sleep apnea, obesity with BMI 44 kg/m² maintained on semaglutide.  He has a history of HFrEF and on TTE 3/2024 LVEF 25-30 % LVIDD 5.5 cm, normal right ventricular systolic function.  On cardiac MRI (NOT STRESS EVALUATED), he had LVEF 30%, normal right ventricular systolic function.  Myocardium consistent with NICM, and possible evidence of past myocarditis.  He has a history of having had mild COVIUD May 2019 which did not require specific therapeutics, or hospitalization.  Negative autoimmune screen 4/2024  For HFrEF he is maintained on carvedilol 6.25 mg twice daily    NYHA Functional Class: 3  ACC/AHA Stage C heart failure  Volume status: euvolemic   Perfusion status: Warm to touch  Aetiology: TBD    PLAN:  #HFrEF    -History of hypothyroidism, and will check labs including thyroid function  -Plan for TTE, cardiopulmonary exercise test after heart failure pharmacotherapy is at goal  -Medication optimization, as detailed below  -CTA coronaries, evaluate for coronary ischemia  -He was referred to cardiac rehabilitation today    -Medication optimisation:  BB: Increase carvedilol to 6.25 mg twice daily  RAASi: Start sacubitril/valsartan 24/26 mg twice daily  AA: Start spironolactone 25 mg once daily, we discussed potential side effects.  Will monitor renal function closely  SGLT2i: Start dapagliflozin 10 mg once daily    COUNSELING:   We discussed the following non-pharmacological measures during this visit:  ·Smoking and alcohol abstinence/cessation, if applicable.  ·Dietary and medication compliance (in particular, salt restriction)  ·Monitoring daily weights and blood pressures  ·Exercise  regimen (walking)    Heart Failure Education Booklet given: 7/1/2024    #Palpitations  -Ambulatory ECG monitor, 3 days.  Placed today  -Check thyroid function    #Obesity  -Continue semaglutide  -Referred to the [facility] weight loss team    #Untreated sleep apnea  -Referred to the sleep medicine team today for management    This note was transcribed using the Dragon Dictation system. There may be grammatical, punctuation, or verbiage errors that can occur with voice recognition programs.    Monique Blevins MD PhD

## 2024-07-01 NOTE — PATIENT INSTRUCTIONS
Thank you for coming in today. If you have any questions or concerns, you may call the Heart Failure Office at [phone] option 6, or [phone].  You may also contact our heart failure nursing team via email on [email or web address].    For quicker results set-up your  [employer] account to receive results and other correspondence directly to your phone.    Please bring all your pills/medications to your Cardiology appointments.    **  - We will place an ambulatory ECG monitor. Wear this for 3 days and mail back as directed    - Please make the following medication changes:  1.  START spironolactone 25 mg once daily    2.  START Entresto 24/26 mg twice daily    3.  START generic dapagliflozin (Farxiga) 10 mg once daily    4. INCREASE Carvedilol to 6.25 mg twice daily    - Please have the following tests done:  1.Blood tests in 1 week (CBC, comprehensive panel, TSH with auto reflex, iron, TIBC, ferritin,Vitamin B12,  folate, BNP, lipids, HIV)    2.  Blood test in 4 weeks (RFP, BNP)    3. CTA coronaries , CALL [phone] to schedule this test    -You will be referred to the following teams, CALL  [phone] to schedule your appointments with:  1.  Cardiac rehabilitation    2. Sleep medicine ( [facility])    - Please make an appointment to be seen in 1 month

## 2024-07-02 ENCOUNTER — TELEPHONE (OUTPATIENT)
Dept: CARDIOLOGY | Facility: HOSPITAL | Age: 29
End: 2024-07-02
Payer: COMMERCIAL

## 2024-07-02 DIAGNOSIS — I50.9 CONGESTIVE HEART FAILURE, UNSPECIFIED HF CHRONICITY, UNSPECIFIED HEART FAILURE TYPE (MULTI): Primary | ICD-10-CM

## 2024-07-06 LAB
ATRIAL RATE: 87 BPM
P AXIS: 36 DEGREES
P OFFSET: 215 MS
P ONSET: 162 MS
PR INTERVAL: 128 MS
Q ONSET: 226 MS
QRS COUNT: 14 BEATS
QRS DURATION: 86 MS
QT INTERVAL: 352 MS
QTC CALCULATION(BAZETT): 423 MS
QTC FREDERICIA: 398 MS
R AXIS: -6 DEGREES
T AXIS: 56 DEGREES
T OFFSET: 402 MS
VENTRICULAR RATE: 87 BPM

## 2024-07-08 ENCOUNTER — SPECIALTY PHARMACY (OUTPATIENT)
Dept: INTERNAL MEDICINE | Facility: HOSPITAL | Age: 29
End: 2024-07-08
Payer: COMMERCIAL

## 2024-07-08 NOTE — PROGRESS NOTES
The patient was called today to discuss the new med received for his Non-alcoholic steatohepatosis (SEBASTIAN). He states that he started it on 7/5/24. Reviewed mechanism of the medication as well as side effects. Advised that he/she will need to follow up with their provider 2-3 months after starting the med so that labs can be monitored.     He states that he has been experiencing heartburn. Advised that abdominal pain is a known side effect of the Rezdiffra, but not heartburn and to discuss this with his provider if it continues.    Patient has labs that were ordered by his cardiologist (hepatic function panel) and he plans to complete this in the next few weeks. Discussed the potential for hepatic enzymes to increase after therapy initiation and that this should be monitored (though at this point, there isn't a need for additional LFTs to be ordered since he'll be having one drawn soon).

## 2024-07-11 ENCOUNTER — OFFICE VISIT (OUTPATIENT)
Dept: SLEEP MEDICINE | Facility: CLINIC | Age: 29
End: 2024-07-11
Payer: COMMERCIAL

## 2024-07-11 VITALS
WEIGHT: 315 LBS | BODY MASS INDEX: 42.66 KG/M2 | OXYGEN SATURATION: 97 % | HEART RATE: 82 BPM | DIASTOLIC BLOOD PRESSURE: 80 MMHG | SYSTOLIC BLOOD PRESSURE: 129 MMHG | HEIGHT: 72 IN

## 2024-07-11 DIAGNOSIS — I50.9 CONGESTIVE HEART FAILURE, UNSPECIFIED HF CHRONICITY, UNSPECIFIED HEART FAILURE TYPE (MULTI): ICD-10-CM

## 2024-07-11 DIAGNOSIS — G47.33 OBSTRUCTIVE SLEEP APNEA: Primary | Chronic | ICD-10-CM

## 2024-07-11 DIAGNOSIS — R29.818 SUSPECTED SLEEP APNEA: ICD-10-CM

## 2024-07-11 DIAGNOSIS — I42.0 DILATED CARDIOMYOPATHY (MULTI): Chronic | ICD-10-CM

## 2024-07-11 PROCEDURE — 99214 OFFICE O/P EST MOD 30 MIN: CPT | Performed by: NURSE PRACTITIONER

## 2024-07-11 PROCEDURE — 3008F BODY MASS INDEX DOCD: CPT | Performed by: NURSE PRACTITIONER

## 2024-07-11 PROCEDURE — 99204 OFFICE O/P NEW MOD 45 MIN: CPT | Performed by: NURSE PRACTITIONER

## 2024-07-11 PROCEDURE — G2211 COMPLEX E/M VISIT ADD ON: HCPCS | Performed by: NURSE PRACTITIONER

## 2024-07-11 PROCEDURE — 1036F TOBACCO NON-USER: CPT | Performed by: NURSE PRACTITIONER

## 2024-07-11 ASSESSMENT — SLEEP AND FATIGUE QUESTIONNAIRES
HOW LIKELY ARE YOU TO NOD OFF OR FALL ASLEEP WHILE SITTING AND TALKING TO SOMEONE: WOULD NEVER DOZE
WORRIED_DISTRESSED_DUE_TO_SLEEP: NOT AT ALL NOTICEABLE
HOW LIKELY ARE YOU TO NOD OFF OR FALL ASLEEP WHEN YOU ARE A PASSENGER IN A CAR FOR AN HOUR WITHOUT A BREAK: WOULD NEVER DOZE
HOW LIKELY ARE YOU TO NOD OFF OR FALL ASLEEP WHILE SITTING QUIETLY AFTER LUNCH WITHOUT ALCOHOL: WOULD NEVER DOZE
SLEEP_PROBLEM_NOTICEABLE_TO_OTHERS: NOT AT ALL NOTICEABLE
HOW LIKELY ARE YOU TO NOD OFF OR FALL ASLEEP WHILE LYING DOWN TO REST IN THE AFTERNOON WHEN CIRCUMSTANCES PERMIT: SLIGHT CHANCE OF DOZING
ESS-CHAD TOTAL SCORE: 6
SITING INACTIVE IN A PUBLIC PLACE LIKE A CLASS ROOM OR A MOVIE THEATER: SLIGHT CHANCE OF DOZING
SATISFACTION_WITH_CURRENT_SLEEP_PATTERN: SATISFIED
SLEEP_PROBLEM_INTERFERES_DAILY_ACTIVITIES: A LITTLE
HOW LIKELY ARE YOU TO NOD OFF OR FALL ASLEEP WHILE SITTING AND READING: SLIGHT CHANCE OF DOZING
HOW LIKELY ARE YOU TO NOD OFF OR FALL ASLEEP WHILE WATCHING TV: MODERATE CHANCE OF DOZING
HOW LIKELY ARE YOU TO NOD OFF OR FALL ASLEEP IN A CAR, WHILE STOPPED FOR A FEW MINUTES IN TRAFFIC: SLIGHT CHANCE OF DOZING
DIFFICULTY_STAYING_ASLEEP: MILD

## 2024-07-11 ASSESSMENT — PATIENT HEALTH QUESTIONNAIRE - PHQ9
1. LITTLE INTEREST OR PLEASURE IN DOING THINGS: NOT AT ALL
SUM OF ALL RESPONSES TO PHQ9 QUESTIONS 1 AND 2: 0
2. FEELING DOWN, DEPRESSED OR HOPELESS: NOT AT ALL

## 2024-07-11 ASSESSMENT — COLUMBIA-SUICIDE SEVERITY RATING SCALE - C-SSRS
1. IN THE PAST MONTH, HAVE YOU WISHED YOU WERE DEAD OR WISHED YOU COULD GO TO SLEEP AND NOT WAKE UP?: NO
6. HAVE YOU EVER DONE ANYTHING, STARTED TO DO ANYTHING, OR PREPARED TO DO ANYTHING TO END YOUR LIFE?: NO
2. HAVE YOU ACTUALLY HAD ANY THOUGHTS OF KILLING YOURSELF?: NO

## 2024-07-11 ASSESSMENT — ENCOUNTER SYMPTOMS
DEPRESSION: 0
LOSS OF SENSATION IN FEET: 0
OCCASIONAL FEELINGS OF UNSTEADINESS: 0

## 2024-07-11 ASSESSMENT — PAIN SCALES - GENERAL: PAINLEVEL: 0-NO PAIN

## 2024-07-11 NOTE — PATIENT INSTRUCTIONS
[facility] Sleep Medicine  [facility] [address]  [facility]  [address]  [facility] [address]       NAME: Cirilo Wills   DATE:  07/11/24    DIAGNOSIS:   1. Dilated cardiomyopathy (Multi)  In-Center Sleep Study (Sleep Provider Only)      2. Suspected sleep apnea  Referral to Adult Sleep Medicine      3. Obstructive sleep apnea  In-Center Sleep Study (Sleep Provider Only)      4. Congestive heart failure, unspecified HF chronicity, unspecified heart failure type (Multi)  In-Center Sleep Study (Sleep Provider Only)          Thank you for coming to the Sleep Medicine Clinic today! Your sleep medicine provider today was: Maria Teresa Tiwari, APRN-CNP Below is a summary of your treatment plan, other important information, and our contact numbers:    TREATMENT PLAN:   - Follow-up in 2 months.  - If not already done, sign up for 'My Chart' and send prescription requests or messages through this      Scheduling a Sleep Study    Call the  Sleep Testing Center to speak with a sleep testing  to book your overnight sleep study procedure at one of our adult and pediatric-friendly sleep labs. Overnight sleep studies may be scheduled on a weekday or weekend.    We have child life services on a case by case basis at the [facility] location. We also perform daytime testing for shift workers on a case by case basis.    Locations for sleep studies are: [facility], [facility] ([facility]), [facility], [facility], [place].    Bring your usual medications and nightly routine items for your sleep study. In order to fall asleep faster in sleep lab, we advise patients to wake up earlier on the morning of the scheduled testing and avoid napping prior to testing. Sometimes, your provider may prescribe a sleep aid to be taken at lights out in the sleep lab. If you are taking a sleep aid, please have somebody pick you up after the sleep  testing.    Results of your sleep study will be given to the ordering clinician. Please contact their office for results or follow up as directed by your clinician.    For additional information about the sleep medicine services, please call [phone]       EASY WAYS TO IMPROVE YOUR SLEEP:  1. Go to bed and wake up at the same time every day.   Aim for 8 hours but some people need less, some need more.   Get out of bed if you are not sleeping.   Limit naps to 20 min or less.   2. Expose yourself to daylight and/ or bright light in the morning.   Go outside or spend time near a window each morning.   You can use a light box (found on Amazon) if you wake before the sunrise.   Limit light exposure in the evenings (including electronic usage).   Try meditation, reading, stretching, deep breathing, warm shower or bath, or yoga nidra as part of your bedtime routine. There are many great FREE, videos or audio tracks on [employer]/ [employer], etc for guidance.  3. Exercise, in some form, EVERY day, but not too close to bedtime. Consider making this part of your routine at the start of your day, followed by a cool shower.  4. Eat meals at roughly the same time every day. Make sure you are prioritizing fruits, vegetables, whole grains, lean proteins.  5. Time your caffeine intake. Make sure you are not drinking caffeine within 8 to 12 hours prior to your bedtime.   6. Avoid marijuana, alcohol, and nicotine. They will reduce sleep quality in any quantity.  7. Learn to manage anxiety. Psychology services at  can be reached at [phone] to schedule an appointment.     IMPORTANT INFORMATION:     Call 911 for medical emergencies.  Our offices are generally open from Monday-Friday, 9 am - 5 pm.  If you need to get in touch with me, you may either call me and my team(number is below) or you can use [employer].  If a referral for a test, for CPAP, or for another specialist was made, and you have not heard about scheduling this within  a week, please call scheduling at [phone]).  If you are unable to make your appointment for clinic or an overnight study, kindly call the office at least 48 hours in advance to cancel and reschedule.  If you are on CPAP, please bring your device's card to each clinic appointment unless told otherwise by your provider.  There are no supporting services by either the sleep doctors or their staff on weekends and Holidays, or after 5 PM on weekdays.   If you have been asked to come to a sleep study, make sure you bring toiletries, a comfy pillow, and any nighttime medications that you may regularly take. Also be sure to eat dinner before you arrive. We generally do not provide meals.      PRESCRIPTIONS:  We require 7 days advanced notice for prescription refills. If we do not receive the request in this time, we cannot guarantee that your medication will be refilled in time. Please contact the sleep nurses listed below for refills or request via [employer].     IMPORTANT PHONE NUMBERS:   Sleep Medicine Clinic Fax: [phone]  Appointments (for Pediatric Sleep Clinic): [phone] - option 1  Appointments (for Adult Sleep Clinic): [phone] - option 2  Appointments (For Sleep Studies): [phone] - option 3  Behavioral Sleep Medicine: [phone]  Sleep Surgery: [phone]  ENT (Otolaryngology): [phone]  Headache Clinic (Neurology): [phone]  Neurology: [phone]  Psychiatry: [phone]  Pulmonary Function Testing (PFT) Center: [phone]  Pulmonary Medicine: [phone]  [employer] (DME): [phone]  [employer] (DME): [phone]  [facility] ([facility]): [phone]    Our Adult Sleep Medicine Team (Please do not hesitate to call the office or sleep nurse with any questions between appointments):    Adult Sleep Nurses (Ama Song RN and Yumiko Clark RN):  For clinical questions and refilling prescriptions: [phone]  Email  sleep diaries and other documents at: [email or web address]    Adult Sleep Medicine Secretaries:  Modesta Ashley (For Latanya/Dickey/Krise/Strojoya/Yeh):   P: [phone]  F: [phone]  Salina Black (For Gant/Te): P: [phone]  Fax: [phone]  Pari Khalil (For Jurcevic/Blank): P: [phone]  F: [phone]  Flory Booker (For Juan M): P: [phone]  F: [phone]  Bessy Marquez (For Milagros/Bassam/Zakhary): P: [phone]  F: [phone]  Pam Calvo (For Cyrus/Paul): P: [phone]  F: [phone]     Adult Sleep Medicine Advanced Practice Providers:  Jibmo Mello ([place], [place])  Trang Banegas ([facility])  Maria Teresa Tiwari CNP ([place], [place], [place])  Ghazal Delvalle CNP ([place], [place], [place])  Germaine Mcgowan ([facility], [place], [place])  Dante Miller CNP ([facility])      Our Sleep Testing Center (STC) Locations:  Our team will contact you to schedule your sleep study, however, you can contact us as follow:  Main Phone Line (scheduling only): [phone]), option 3  Adult and Pediatric Locations  [facility] (6 years and older): [facility] - 4th floor ([address] [place] [address]) After hours line: [phone]  [facility] (Main campus: All ages): [facility], 6th floor. After hours line: [phone]  [facility] (5 years and older; younger considered on case-by-case basis): [address] [facility]; [address], [address]. Scheduling  After hours line: [phone]   [place] (6 years and older): [address]; [address]; [address]   [place] (6 years and older): [address] [facility], [address]  After hours line: [phone]   [place] (13 years and older) in [place]: [address] [facility], 2nd floor  After hours line: [phone]  [facility] (13 year and older): [address]  After hours line: [phone]  "(Home studies out of [facility])    Adult Only Locations:   [place] (18 years and older): [address] [facility], 2nd floor   [place] (18 years and older): [address] [facility]; 4th floor  After hours line: [phone]   [place] (18 years and older) at [place]: [address] [facility]  After hours line: [phone]        CONTACTING YOUR SLEEP MEDICINE PROVIDER:  Send a message directly to your provider through \"My Chart\", which is the email service through your  Records Account: [email or web address]   Call [phone] and leave a message. One of the administrative assistants will forward the message to your sleep medicine provider through \"My Chart\" and/or email.     Your sleep medicine provider for this visit was: Maria Teresa Tiwari, APRN-CNP    In the event that you are running more than 15 minutes late to your appointment, I will kindly ask you to reschedule.       "

## 2024-07-11 NOTE — ASSESSMENT & PLAN NOTE
Remote diagnosis; currently untreated  Recent CHF diagnosed with EF 25-35%. Requesting in lab to assess central apneas and treat appropriately if present. Cirilo is agreeable  -Plan for N30i or similar mask. Discussed with Cirilo today, reports this looked more reasonable to wear and position himself comfortably.   -RTC in [place] for compliance in Oct/ Nov

## 2024-07-11 NOTE — PROGRESS NOTES
Patient: Cirilo Wills    [id number]  : 1995 -- AGE 28 y.o.    Provider: LISSETTE Perdomo     Location [facility]   Service Date: 2024              [facility] Sleep Medicine Clinic  New Visit Note      HISTORY OF PRESENT ILLNESS     The patient's referring provider is: Monique Blevins MD*    HISTORY OF PRESENT ILLNESS   Cirilo Wills is a 28 y.o. male who presents to a [facility] Sleep Medicine Clinic for a sleep medicine evaluation with concerns of KG. [place] [facility] .     The patient has h/o CHF EF 25-30% on echo in 2024; hyperlipidemia, obesity, prediabetes, SEBASTIAN, MS, KG.    Past Sleep History  Patient has had a sleep study in the past in 2019 in [place]. Was diagnosed with severe sleep apnea (reports AHI ~ 60) and set up with CPAP.   States he used for a month or less and returned the equipment. Was forced to sleep on his back due to the mask, felt worse when he would wake in the mornings.     Current History    On today's visit, the patient reports recent diagnosis of CHF. Was recommended repeat testing per Dr. Blevins. Recently started on Entresto and other meds. Has hs of MS. Notes a lesion is near his respiratory center in his brain.     Sleep schedule  on weekdays / work days:  Usual Bedtime:    11 pm  Falls asleep around:  30 min  # of awakenings:   Wake time:  6 AM    Naps: afternoon 1-2 hours    Sleep schedule on weekends/non work days :  Usual Bedtime:    12:30  Falls asleep around:  15 min  # of awakenings:   Wake time:  8 am    Naps: afternoon 1.5-3 hours    Preferred sleeping position: side, stomach     Sleep-related ROS:    Problems going to sleep: No problems going to sleep    Problems staying asleep : not typically    Breathing during sleep: snoring, witnessed apneas, and nocturnal reflux symptoms    Daytime Symptoms  On awakening patient reports: morning dry mouth and morning sore  throat    RLS screen:  RLSSCREEN: - Sensations: Patient does not have unusual sensations in their extremities that cause an urge to move them     Sleep-related behaviors:   DENIES    Fatigue: denies    ESS: 6   CHANTEL: 3  FOSQ:  39      REVIEW OF SYSTEMS     REVIEW OF SYSTEMS  Review of Systems   All other systems reviewed and are negative.      ALLERGIES AND MEDICATIONS     ALLERGIES  No Known Allergies    MEDICATIONS  Current Outpatient Medications   Medication Sig Dispense Refill    atorvastatin (Lipitor) 40 mg tablet Take 1 tablet (40 mg) by mouth once daily. 90 tablet 3    carvedilol (Coreg) 6.25 mg tablet Take 1 tablet (6.25 mg) by mouth 2 times daily (morning and late afternoon). 60 tablet 11    dapagliflozin propanediol (Farxiga) 10 mg Take 1 tablet (10 mg) by mouth once daily. 30 tablet 11    resmetirom (Rezdiffra) 100 mg tablet Take 100 mg by mouth once daily. 30 tablet 11    sacubitriL-valsartan (Entresto) 24-26 mg tablet Take 1 tablet by mouth 2 times a day. 60 tablet 11    semaglutide, weight loss, (Wegovy) 0.5 mg/0.5 mL pen injector Inject 0.5 mg under the skin 1 (one) time per week for 4 doses. 2 mL 2    spironolactone (Aldactone) 25 mg tablet Take 1 tablet (25 mg) by mouth once daily. 30 tablet 11    Vumerity 231 mg capsule,delayed release(DR/EC) TAKE 2 CAPSULES BY MOUTH 2 TIMES A DAY. 120 capsule 5     No current facility-administered medications for this visit.         PAST HISTORY     PAST MEDICAL HISTORY  Past Medical History:   Diagnosis Date    Dilated cardiomyopathy (Multi) 04/11/2024    EF 25 to 30%    Multiple sclerosis (Multi) 11/10/2022    Multiple sclerosis    Obstructive sleep apnea 03/20/2019    Thyrotoxicosis, unspecified without thyrotoxic crisis or storm 12/09/2014    Hyperthyroidism       PAST SURGICAL HISTORY:  Past Surgical History:   Procedure Laterality Date    MR HEAD ANGIO WO IV CONTRAST  3/21/2013    MR HEAD ANGIO WO IV CONTRAST 3/21/2013 [facility] ANCILLARY LEGACY       FAMILY  HISTORY  Family History   Problem Relation Name Age of Onset    Heart attack Maternal Grandfather  50       DOES/DOES NOT EC: does have a family history of sleep disorder. Father KG and brother suspected      SOCIAL HISTORY  He  reports that he has quit smoking. His smoking use included cigarettes. He has never used smokeless tobacco. He reports current alcohol use. He reports current drug use. Drug: Marijuana. He currently lives alone.     Caffeine consumption: 1x day  Alcohol consumption: 3x week  Smoking: none  Marijuana: yes; vape     PHYSICAL EXAM     VITAL SIGNS: /80 (BP Location: Left arm, Patient Position: Sitting, BP Cuff Size: Large adult)   Pulse 82   Ht 1.829 m (6')   Wt 144 kg (318 lb 8 oz)   SpO2 97%   BMI 43.20 kg/m²      PREVIOUS WEIGHTS:  Wt Readings from Last 3 Encounters:   07/11/24 144 kg (318 lb 8 oz)   07/01/24 146 kg (322 lb)   06/27/24 146 kg (322 lb)       Physical Exam  Physical Exam   Constitutional: Alert and oriented, cooperative, no obvious distress   HEENT: Non icteric or anemic, EOM WNL bilaterally     Upper Airway Examination:   Modified Mallampati Class: 3  OP Lateral wall narrowing rdgrdrrdarddrderd:rd rd3rd Tonsil ndGndrndanddndend:nd nd2nd No high arched palate  Tongue Scalloping: N  Retrognathia: N  Overjet: N    Neck: Supple, no JVD, no goiter, no adenopathy  Chest: CTA bilaterally, no wheezing, crackles, rubs   Cardiac: RRR, S1 and S2, no murmur, rub, thrill   Abdomen: Obese, Soft, nontender, no masses, no organomegaly   Extremities: No clubbing, no LL edema   Neuromuscular: Cranial nerves grossly intact, no focal deficits        RESULTS/DATA     Bicarbonate (mmol/L)   Date Value   06/08/2024 29   04/05/2024 26   03/18/2024 28     Iron (ug/dL)   Date Value   04/11/2024 93     % Saturation (%)   Date Value   04/11/2024 25     TIBC (ug/dL)   Date Value   04/11/2024 377     Ferritin (ng/mL)   Date Value   04/11/2024 588 (H)     Echo 3/27/24:      ASSESSMENT/PLAN     Mr. Wills is a 28 y.o. male and He  was referred to the [facility] for evaluation of KG    Problem List and Orders  Problem List Items Addressed This Visit             ICD-10-CM    Obstructive sleep apnea - Primary (Chronic) G47.33     Remote diagnosis; currently untreated  Recent CHF diagnosed with EF 25-35%. Requesting in lab to assess central apneas and treat appropriately if present. Cirilo is agreeable  -Plan for N30i or similar mask. Discussed with Cirilo today, reports this looked more reasonable to wear and position himself comfortably.   -RTC in [place] for compliance         Relevant Orders    In-Center Sleep Study (Sleep Provider Only)    Dilated cardiomyopathy (Multi) (Chronic) I42.0    Relevant Orders    In-Center Sleep Study (Sleep Provider Only)    Congestive heart failure (Multi) I50.9    Relevant Orders    In-Center Sleep Study (Sleep Provider Only)     Other Visit Diagnoses         Codes    Suspected sleep apnea     R29.818

## 2024-07-16 ENCOUNTER — LAB (OUTPATIENT)
Dept: LAB | Facility: LAB | Age: 29
End: 2024-07-16
Payer: COMMERCIAL

## 2024-07-16 DIAGNOSIS — I50.9 CONGESTIVE HEART FAILURE, UNSPECIFIED HF CHRONICITY, UNSPECIFIED HEART FAILURE TYPE (MULTI): ICD-10-CM

## 2024-07-16 LAB
ALBUMIN SERPL BCP-MCNC: 4.7 G/DL (ref 3.4–5)
ALP SERPL-CCNC: 51 U/L (ref 33–120)
ALT SERPL W P-5'-P-CCNC: 65 U/L (ref 10–52)
ANION GAP SERPL CALC-SCNC: 15 MMOL/L (ref 10–20)
AST SERPL W P-5'-P-CCNC: 30 U/L (ref 9–39)
BILIRUB SERPL-MCNC: 0.9 MG/DL (ref 0–1.2)
BNP SERPL-MCNC: 23 PG/ML (ref 0–99)
BODY SURFACE AREA: 2.72 M2
BUN SERPL-MCNC: 11 MG/DL (ref 6–23)
CALCIUM SERPL-MCNC: 10.4 MG/DL (ref 8.6–10.6)
CHLORIDE SERPL-SCNC: 101 MMOL/L (ref 98–107)
CHOLEST SERPL-MCNC: 114 MG/DL (ref 0–199)
CHOLESTEROL/HDL RATIO: 3.2
CO2 SERPL-SCNC: 28 MMOL/L (ref 21–32)
CREAT SERPL-MCNC: 0.76 MG/DL (ref 0.5–1.3)
EGFRCR SERPLBLD CKD-EPI 2021: >90 ML/MIN/1.73M*2
ERYTHROCYTE [DISTWIDTH] IN BLOOD BY AUTOMATED COUNT: 12.2 % (ref 11.5–14.5)
FERRITIN SERPL-MCNC: 750 NG/ML (ref 20–300)
FOLATE SERPL-MCNC: 15.5 NG/ML
GLUCOSE SERPL-MCNC: 83 MG/DL (ref 74–99)
HCT VFR BLD AUTO: 42.2 % (ref 41–52)
HDLC SERPL-MCNC: 35.9 MG/DL
HGB BLD-MCNC: 14.5 G/DL (ref 13.5–17.5)
HIV 1+2 AB+HIV1 P24 AG SERPL QL IA: NONREACTIVE
IRON SATN MFR SERPL: 27 % (ref 25–45)
IRON SERPL-MCNC: 96 UG/DL (ref 35–150)
LDLC SERPL CALC-MCNC: 66 MG/DL
MCH RBC QN AUTO: 31 PG (ref 26–34)
MCHC RBC AUTO-ENTMCNC: 34.4 G/DL (ref 32–36)
MCV RBC AUTO: 90 FL (ref 80–100)
NON HDL CHOLESTEROL: 78 MG/DL (ref 0–149)
NRBC BLD-RTO: 0 /100 WBCS (ref 0–0)
PLATELET # BLD AUTO: 261 X10*3/UL (ref 150–450)
POTASSIUM SERPL-SCNC: 4.2 MMOL/L (ref 3.5–5.3)
PROT SERPL-MCNC: 7.1 G/DL (ref 6.4–8.2)
RBC # BLD AUTO: 4.68 X10*6/UL (ref 4.5–5.9)
SODIUM SERPL-SCNC: 140 MMOL/L (ref 136–145)
T4 FREE SERPL-MCNC: 1.12 NG/DL (ref 0.78–1.48)
TIBC SERPL-MCNC: 353 UG/DL (ref 240–445)
TRIGL SERPL-MCNC: 61 MG/DL (ref 0–149)
TSH SERPL-ACNC: 4 MIU/L (ref 0.44–3.98)
UIBC SERPL-MCNC: 257 UG/DL (ref 110–370)
VIT B12 SERPL-MCNC: 481 PG/ML (ref 211–911)
VLDL: 12 MG/DL (ref 0–40)
WBC # BLD AUTO: 9.5 X10*3/UL (ref 4.4–11.3)

## 2024-07-16 PROCEDURE — 83550 IRON BINDING TEST: CPT

## 2024-07-16 PROCEDURE — 84443 ASSAY THYROID STIM HORMONE: CPT

## 2024-07-16 PROCEDURE — 82728 ASSAY OF FERRITIN: CPT

## 2024-07-16 PROCEDURE — 85027 COMPLETE CBC AUTOMATED: CPT

## 2024-07-16 PROCEDURE — 36415 COLL VENOUS BLD VENIPUNCTURE: CPT

## 2024-07-16 PROCEDURE — 82746 ASSAY OF FOLIC ACID SERUM: CPT

## 2024-07-16 PROCEDURE — 80061 LIPID PANEL: CPT

## 2024-07-16 PROCEDURE — 80053 COMPREHEN METABOLIC PANEL: CPT

## 2024-07-16 PROCEDURE — 87389 HIV-1 AG W/HIV-1&-2 AB AG IA: CPT

## 2024-07-16 PROCEDURE — 83540 ASSAY OF IRON: CPT

## 2024-07-16 PROCEDURE — 84439 ASSAY OF FREE THYROXINE: CPT

## 2024-07-16 PROCEDURE — 83880 ASSAY OF NATRIURETIC PEPTIDE: CPT

## 2024-07-16 PROCEDURE — 82607 VITAMIN B-12: CPT

## 2024-07-24 ENCOUNTER — HOSPITAL ENCOUNTER (OUTPATIENT)
Dept: RADIOLOGY | Facility: HOSPITAL | Age: 29
Discharge: HOME | End: 2024-07-24
Payer: COMMERCIAL

## 2024-07-24 VITALS — HEART RATE: 86 BPM | SYSTOLIC BLOOD PRESSURE: 113 MMHG | OXYGEN SATURATION: 94 % | DIASTOLIC BLOOD PRESSURE: 55 MMHG

## 2024-07-24 DIAGNOSIS — I50.9 CONGESTIVE HEART FAILURE, UNSPECIFIED HF CHRONICITY, UNSPECIFIED HEART FAILURE TYPE (MULTI): ICD-10-CM

## 2024-07-24 PROCEDURE — 75574 CT ANGIO HRT W/3D IMAGE: CPT

## 2024-07-24 PROCEDURE — 2500000005 HC RX 250 GENERAL PHARMACY W/O HCPCS

## 2024-07-24 PROCEDURE — 2500000001 HC RX 250 WO HCPCS SELF ADMINISTERED DRUGS (ALT 637 FOR MEDICARE OP)

## 2024-07-24 PROCEDURE — 2550000001 HC RX 255 CONTRASTS: Performed by: STUDENT IN AN ORGANIZED HEALTH CARE EDUCATION/TRAINING PROGRAM

## 2024-07-24 PROCEDURE — 2500000005 HC RX 250 GENERAL PHARMACY W/O HCPCS: Performed by: STUDENT IN AN ORGANIZED HEALTH CARE EDUCATION/TRAINING PROGRAM

## 2024-07-24 PROCEDURE — 75574 CT ANGIO HRT W/3D IMAGE: CPT | Performed by: RADIOLOGY

## 2024-07-24 RX ORDER — NITROGLYCERIN 0.4 MG/1
0.8 TABLET SUBLINGUAL ONCE
Status: COMPLETED | OUTPATIENT
Start: 2024-07-24 | End: 2024-07-24

## 2024-07-24 RX ORDER — METOPROLOL TARTRATE 1 MG/ML
5 INJECTION, SOLUTION INTRAVENOUS ONCE AS NEEDED
Status: COMPLETED | OUTPATIENT
Start: 2024-07-24 | End: 2024-07-24

## 2024-07-24 RX ORDER — LORAZEPAM 2 MG/ML
0.5 INJECTION INTRAMUSCULAR EVERY 5 MIN PRN
Status: DISCONTINUED | OUTPATIENT
Start: 2024-07-24 | End: 2024-07-25 | Stop reason: HOSPADM

## 2024-07-24 RX ORDER — METOPROLOL TARTRATE 1 MG/ML
5 INJECTION, SOLUTION INTRAVENOUS ONCE
Status: COMPLETED | OUTPATIENT
Start: 2024-07-24 | End: 2024-07-24

## 2024-07-24 RX ORDER — METOPROLOL TARTRATE 50 MG/1
100 TABLET ORAL ONCE
Status: DISCONTINUED | OUTPATIENT
Start: 2024-07-24 | End: 2024-07-25 | Stop reason: HOSPADM

## 2024-07-24 RX ORDER — METOPROLOL TARTRATE 50 MG/1
100 TABLET ORAL ONCE AS NEEDED
Status: DISCONTINUED | OUTPATIENT
Start: 2024-07-24 | End: 2024-07-25 | Stop reason: HOSPADM

## 2024-08-02 PROCEDURE — RXMED WILLOW AMBULATORY MEDICATION CHARGE

## 2024-08-03 ENCOUNTER — PHARMACY VISIT (OUTPATIENT)
Dept: PHARMACY | Facility: CLINIC | Age: 29
End: 2024-08-03
Payer: MEDICARE

## 2024-08-07 DIAGNOSIS — R12 HEART BURN: Primary | ICD-10-CM

## 2024-08-07 RX ORDER — PANTOPRAZOLE SODIUM 20 MG/1
20 TABLET, DELAYED RELEASE ORAL
Qty: 30 TABLET | Refills: 11 | Status: SHIPPED | OUTPATIENT
Start: 2024-08-07 | End: 2025-08-07

## 2024-08-12 ENCOUNTER — APPOINTMENT (OUTPATIENT)
Dept: SLEEP MEDICINE | Facility: CLINIC | Age: 29
End: 2024-08-12
Payer: COMMERCIAL

## 2024-08-12 ENCOUNTER — CLINICAL SUPPORT (OUTPATIENT)
Dept: SLEEP MEDICINE | Facility: CLINIC | Age: 29
End: 2024-08-12
Payer: COMMERCIAL

## 2024-08-12 VITALS
DIASTOLIC BLOOD PRESSURE: 74 MMHG | HEIGHT: 72 IN | WEIGHT: 315 LBS | BODY MASS INDEX: 42.66 KG/M2 | SYSTOLIC BLOOD PRESSURE: 134 MMHG | TEMPERATURE: 97.5 F

## 2024-08-12 DIAGNOSIS — G47.33 OBSTRUCTIVE SLEEP APNEA: Chronic | ICD-10-CM

## 2024-08-12 DIAGNOSIS — I50.9 CONGESTIVE HEART FAILURE, UNSPECIFIED HF CHRONICITY, UNSPECIFIED HEART FAILURE TYPE (MULTI): ICD-10-CM

## 2024-08-12 DIAGNOSIS — I42.0 DILATED CARDIOMYOPATHY (MULTI): Chronic | ICD-10-CM

## 2024-08-12 ASSESSMENT — SLEEP AND FATIGUE QUESTIONNAIRES
SITING INACTIVE IN A PUBLIC PLACE LIKE A CLASS ROOM OR A MOVIE THEATER: WOULD NEVER DOZE
HOW LIKELY ARE YOU TO NOD OFF OR FALL ASLEEP IN A CAR, WHILE STOPPED FOR A FEW MINUTES IN TRAFFIC: WOULD NEVER DOZE
HOW LIKELY ARE YOU TO NOD OFF OR FALL ASLEEP WHILE SITTING AND READING: WOULD NEVER DOZE
HOW LIKELY ARE YOU TO NOD OFF OR FALL ASLEEP WHILE SITTING QUIETLY AFTER LUNCH WITHOUT ALCOHOL: WOULD NEVER DOZE
ESS-CHAD TOTAL SCORE: 4
HOW LIKELY ARE YOU TO NOD OFF OR FALL ASLEEP WHILE WATCHING TV: SLIGHT CHANCE OF DOZING
HOW LIKELY ARE YOU TO NOD OFF OR FALL ASLEEP WHEN YOU ARE A PASSENGER IN A CAR FOR AN HOUR WITHOUT A BREAK: SLIGHT CHANCE OF DOZING
HOW LIKELY ARE YOU TO NOD OFF OR FALL ASLEEP WHILE SITTING AND TALKING TO SOMEONE: WOULD NEVER DOZE
HOW LIKELY ARE YOU TO NOD OFF OR FALL ASLEEP WHILE LYING DOWN TO REST IN THE AFTERNOON WHEN CIRCUMSTANCES PERMIT: MODERATE CHANCE OF DOZING

## 2024-08-13 NOTE — PROGRESS NOTES
[facility] TECH NOTE:     Patient: Cirilo Wills   MRN//AGE: [id number]  1995  28 y.o.   Technologist: MARIANA ALVAREZ   Room: 2   Service Date: 2024        Sleep Testing Location: [place] [address]     [place]: 4    TECHNOLOGIST SLEEP STUDY PROCEDURE NOTE:   This sleep study is being conducted according to the policies and procedures outlined by the AAS accreditation standards.  The sleep study procedure and processes involved during this appointment was explained to the patient/patient’s family, questions were answered. The patient/family verbalized understanding.      The patient is a 28 y.o. year old male scheduled for a Diagnostic PSG Split night with montage of  PAP MASTER     The study that was ultimately completed was a Diagnostic PSG Split night with montage of  PAP MASTER     The full study Was completed.  Patient questionnaires completed?: yes     Consents signed? yes    Initial Fall Risk Screening:     Cirilo has not fallen in the last 6 months.  Cirilo does not have a fear of falling. He does not need assistance with sitting, standing, or walking. he does not need assistance walking in his home. he does not need assistance in an unfamiliar setting. The patient is notusing an assistive device.     Brief Study observations: Mr. Wills stated prior to the study that he had previous sleep studies but was told he could only sleep on his back with CPAP. He was fitted with a N30 Nasal size medium mask per order. During his study he experienced hypopneas/obstructives with associated arousals. He was supine the whole study and had an AHI over 100 within two hours. A split study was started at 0003. A N30 mask was used and CPAP was started on 4cmH2o. He ended on 56tsS5f.       If PAP, which was preferred mask/pressure/mode: N30 nasal mask, pressure ended on 00dzd1i          After the procedure, the patient/family was informed to ensure followup with ordering clinician for testing results.      Technologist: VINNIE  N VANESSA, PCNA

## 2024-08-24 ENCOUNTER — LAB (OUTPATIENT)
Dept: LAB | Facility: LAB | Age: 29
End: 2024-08-24
Payer: COMMERCIAL

## 2024-08-24 DIAGNOSIS — I50.9 CONGESTIVE HEART FAILURE, UNSPECIFIED HF CHRONICITY, UNSPECIFIED HEART FAILURE TYPE (MULTI): ICD-10-CM

## 2024-08-24 DIAGNOSIS — R06.09 EXERTIONAL DYSPNEA: ICD-10-CM

## 2024-08-24 LAB — BNP SERPL-MCNC: 15 PG/ML (ref 0–99)

## 2024-08-24 PROCEDURE — 36415 COLL VENOUS BLD VENIPUNCTURE: CPT

## 2024-08-24 PROCEDURE — 83880 ASSAY OF NATRIURETIC PEPTIDE: CPT

## 2024-08-24 PROCEDURE — 80069 RENAL FUNCTION PANEL: CPT

## 2024-08-24 PROCEDURE — 83735 ASSAY OF MAGNESIUM: CPT

## 2024-08-25 LAB
ALBUMIN SERPL BCP-MCNC: 4.7 G/DL (ref 3.4–5)
ANION GAP SERPL CALC-SCNC: 16 MMOL/L (ref 10–20)
BUN SERPL-MCNC: 10 MG/DL (ref 6–23)
CALCIUM SERPL-MCNC: 9.8 MG/DL (ref 8.6–10.6)
CHLORIDE SERPL-SCNC: 103 MMOL/L (ref 98–107)
CO2 SERPL-SCNC: 27 MMOL/L (ref 21–32)
CREAT SERPL-MCNC: 0.8 MG/DL (ref 0.5–1.3)
EGFRCR SERPLBLD CKD-EPI 2021: >90 ML/MIN/1.73M*2
GLUCOSE SERPL-MCNC: 93 MG/DL (ref 74–99)
MAGNESIUM SERPL-MCNC: 2.07 MG/DL (ref 1.6–2.4)
PHOSPHATE SERPL-MCNC: 4.3 MG/DL (ref 2.5–4.9)
POTASSIUM SERPL-SCNC: 4.2 MMOL/L (ref 3.5–5.3)
SODIUM SERPL-SCNC: 142 MMOL/L (ref 136–145)

## 2024-08-26 ENCOUNTER — PATIENT MESSAGE (OUTPATIENT)
Dept: PRIMARY CARE | Facility: CLINIC | Age: 29
End: 2024-08-26
Payer: COMMERCIAL

## 2024-08-26 DIAGNOSIS — R73.03 PREDIABETES: ICD-10-CM

## 2024-08-26 DIAGNOSIS — I50.9 CHRONIC CONGESTIVE HEART FAILURE, UNSPECIFIED HEART FAILURE TYPE (MULTI): ICD-10-CM

## 2024-08-27 DIAGNOSIS — G47.33 OSA (OBSTRUCTIVE SLEEP APNEA): Primary | ICD-10-CM

## 2024-08-28 ENCOUNTER — OFFICE VISIT (OUTPATIENT)
Dept: PRIMARY CARE | Facility: CLINIC | Age: 29
End: 2024-08-28
Payer: COMMERCIAL

## 2024-08-28 VITALS
SYSTOLIC BLOOD PRESSURE: 111 MMHG | TEMPERATURE: 97.1 F | DIASTOLIC BLOOD PRESSURE: 82 MMHG | WEIGHT: 307 LBS | HEART RATE: 88 BPM | BODY MASS INDEX: 41.63 KG/M2

## 2024-08-28 DIAGNOSIS — I50.9 CONGESTIVE HEART FAILURE, UNSPECIFIED HF CHRONICITY, UNSPECIFIED HEART FAILURE TYPE (MULTI): ICD-10-CM

## 2024-08-28 DIAGNOSIS — I42.0 DILATED CARDIOMYOPATHY (MULTI): Primary | Chronic | ICD-10-CM

## 2024-08-28 PROCEDURE — RXMED WILLOW AMBULATORY MEDICATION CHARGE

## 2024-08-28 PROCEDURE — 1036F TOBACCO NON-USER: CPT | Performed by: FAMILY MEDICINE

## 2024-08-28 PROCEDURE — 99214 OFFICE O/P EST MOD 30 MIN: CPT | Performed by: FAMILY MEDICINE

## 2024-08-28 ASSESSMENT — PAIN SCALES - GENERAL: PAINLEVEL: 0-NO PAIN

## 2024-08-28 NOTE — ASSESSMENT & PLAN NOTE
-He was unable to get enrolled in the cardiac rehab program because of his diagnosis.  -To discuss with his cardiologist at his next visit.

## 2024-08-28 NOTE — PROGRESS NOTES
Subjective   Patient ID: Cirilo Wills is a 28 y.o. male who presents for Follow-up.  HPI  The patient is a 29 yo AA Male with a history of HDL, hypothyroidism, prediabetes, MS, KG, low vit D, diverticulitis, cardiomyopathy associated with with severely decreased ejection fraction between 25% to 30%.  The patient is still complaining about his exertional dyspnea despite his current treatment, even though he thinks that ist has improved by about 50 %-60%.  He was unable to get enrolled in the cardiac rehab program because of his diagnosis.   He was recommended to lose weight in order to help improve his overall cardiovascular risks.  He has lost about 15 lbs over the past 2 months  on  WEGOVY; he would like a dose increase for a greater weight loss results.     A review of system was completed.  All systems were reviewed and were normal, except for the ones that are listed in the HPI.    Objective   Physical Exam  Constitutional:       Appearance: Normal appearance.   HENT:      Head: Normocephalic and atraumatic.      Right Ear: Tympanic membrane, ear canal and external ear normal.      Left Ear: Tympanic membrane, ear canal and external ear normal.      Nose: Nose normal.      Mouth/Throat:      Mouth: Mucous membranes are moist.      Pharynx: Oropharynx is clear.   Eyes:      Extraocular Movements: Extraocular movements intact.      Conjunctiva/sclera: Conjunctivae normal.      Pupils: Pupils are equal, round, and reactive to light.   Cardiovascular:      Rate and Rhythm: Normal rate and regular rhythm.      Pulses: Normal pulses.   Pulmonary:      Effort: Pulmonary effort is normal.      Breath sounds: Normal breath sounds.   Abdominal:      General: Abdomen is flat. Bowel sounds are normal.      Palpations: Abdomen is soft.   Musculoskeletal:         General: Normal range of motion.      Cervical back: Normal range of motion and neck supple.   Skin:     General: Skin is warm.   Neurological:      General: No  focal deficit present.      Mental Status: He is alert and oriented to person, place, and time. Mental status is at baseline.   Psychiatric:         Mood and Affect: Mood normal.         Behavior: Behavior normal.         Thought Content: Thought content normal.         Judgment: Judgment normal.     Assessment/Plan   Problem List Items Addressed This Visit       Dilated cardiomyopathy (Multi) - Primary (Chronic)    BMI 45.0-49.9, adult (Multi)     -WEGOVY dose increased to 1.7 mg/ week on 8/27/2024.          Congestive heart failure (Multi)     -He was unable to get enrolled in the cardiac rehab program because of his diagnosis.  -To discuss with his cardiologist at his next visit.          Patient to return to office in 3 months.

## 2024-08-29 ENCOUNTER — OFFICE VISIT (OUTPATIENT)
Dept: GASTROENTEROLOGY | Facility: CLINIC | Age: 29
End: 2024-08-29
Payer: COMMERCIAL

## 2024-08-29 VITALS
HEIGHT: 72 IN | WEIGHT: 308 LBS | TEMPERATURE: 97.9 F | DIASTOLIC BLOOD PRESSURE: 84 MMHG | BODY MASS INDEX: 41.72 KG/M2 | SYSTOLIC BLOOD PRESSURE: 135 MMHG | HEART RATE: 106 BPM

## 2024-08-29 DIAGNOSIS — K75.81 NASH (NONALCOHOLIC STEATOHEPATITIS): Primary | ICD-10-CM

## 2024-08-29 PROCEDURE — 3008F BODY MASS INDEX DOCD: CPT | Performed by: NURSE PRACTITIONER

## 2024-08-29 PROCEDURE — 99214 OFFICE O/P EST MOD 30 MIN: CPT | Performed by: NURSE PRACTITIONER

## 2024-08-29 PROCEDURE — 1036F TOBACCO NON-USER: CPT | Performed by: NURSE PRACTITIONER

## 2024-08-29 ASSESSMENT — ENCOUNTER SYMPTOMS
FREQUENCY: 0
ABDOMINAL PAIN: 0
CONFUSION: 0
NUMBNESS: 0
APPETITE CHANGE: 0
VOMITING: 0
COUGH: 0
PALPITATIONS: 0
WHEEZING: 0
UNEXPECTED WEIGHT CHANGE: 0
BLOOD IN STOOL: 0
LIGHT-HEADEDNESS: 0
DIARRHEA: 0
BRUISES/BLEEDS EASILY: 0
COLOR CHANGE: 0
WEAKNESS: 0
AGITATION: 0
DIFFICULTY URINATING: 0
ABDOMINAL DISTENTION: 0
DIZZINESS: 0
CONSTIPATION: 0
FEVER: 0
TROUBLE SWALLOWING: 0
HEADACHES: 0
HEMATURIA: 0
SLEEP DISTURBANCE: 0
NAUSEA: 0
DYSURIA: 0
SHORTNESS OF BREATH: 0
CHILLS: 0
TREMORS: 0
JOINT SWELLING: 0
VOICE CHANGE: 0

## 2024-08-29 NOTE — ASSESSMENT & PLAN NOTE
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  Currently on Rezdiffra 100mg daily.  Tolerating well.  He has been encouraged to work on resuming exercising and eating healthier.    Will update fibroscan in November with labs.  Based on these findings, will plan FU in 6 months.

## 2024-08-29 NOTE — PROGRESS NOTES
Subjective   Patient ID: Cirilo Wills is a 28 y.o. male who presents for FU for labs/fibroscan.    5.24.2024:  Here for FU to discuss results and plan of care.  Fibroscan confirms Stage 2 fibrosis and severe steatosis.  Overall, labs are stable with persistent mild elevation in ALT.  He is slowly trying to get back into exercising and has lost 5 lbs since his last visit.  No new symptoms.    HPI  28 year old male referred for elevated liver enzymes.   Pt has history of MS; currently on Vumerity BID for the last 4 years.  To his knowledge, his labs have only recently been elevated, primarily ALT.    03/18/2024:  ALT 55, AST 28.  07/01/2023:  ALT 55, AST 31  10/15/2021:  ALT 60, AST 31  No recent imaging.  Denies any abdominal pain, fevers, chills, nausea, vomiting, jaundice, icterus or bleeding.  His weight has been going up over the last couple of years and he admits to not being every active.  He does try to eat healthy.  He works from home for the [employer].  ETOH intake is whiskey 6oz 2-3 days per week.  No history of drug use.     Review of Systems   Constitutional:  Negative for appetite change, chills, fever and unexpected weight change.   HENT:  Negative for mouth sores, nosebleeds, trouble swallowing and voice change.    Eyes:  Negative for visual disturbance.   Respiratory:  Negative for cough, shortness of breath and wheezing.    Cardiovascular:  Negative for chest pain, palpitations and leg swelling.   Gastrointestinal:  Negative for abdominal distention, abdominal pain, blood in stool, constipation, diarrhea, nausea and vomiting.   Genitourinary:  Negative for decreased urine volume, difficulty urinating, dysuria, frequency, hematuria and urgency.   Musculoskeletal:  Negative for gait problem and joint swelling.   Skin:  Negative for color change, pallor and rash.   Neurological:  Negative for dizziness, tremors, weakness, light-headedness, numbness and headaches.   Hematological:  Does not bruise/bleed easily.    Psychiatric/Behavioral:  Negative for agitation, behavioral problems, confusion and sleep disturbance.        Objective   Physical Exam  Constitutional:       General: He is awake.      Appearance: Normal appearance. He is well-developed.   HENT:      Head: Normocephalic and atraumatic.      Right Ear: Hearing normal.      Left Ear: Hearing normal.      Nose: Nose normal.      Mouth/Throat:      Lips: Pink.      Mouth: Mucous membranes are moist.   Eyes:      General: Lids are normal.      Extraocular Movements: Extraocular movements intact.      Conjunctiva/sclera: Conjunctivae normal.      Pupils: Pupils are equal, round, and reactive to light.   Cardiovascular:      Rate and Rhythm: Normal rate and regular rhythm.      Pulses: Normal pulses.      Heart sounds: Normal heart sounds.   Pulmonary:      Effort: Pulmonary effort is normal.      Breath sounds: Normal breath sounds.   Abdominal:      General: Abdomen is flat. Bowel sounds are normal.      Palpations: Abdomen is soft.   Musculoskeletal:      Cervical back: Normal range of motion and neck supple.   Feet:      Right foot:      Skin integrity: Skin integrity normal.      Left foot:      Skin integrity: Skin integrity normal.   Skin:     General: Skin is warm.   Neurological:      General: No focal deficit present.      Mental Status: He is alert and oriented to person, place, and time.      Cranial Nerves: Cranial nerves 2-12 are intact.      Sensory: Sensation is intact.      Motor: Motor function is intact.      Coordination: Coordination is intact.      Gait: Gait is intact.   Psychiatric:         Attention and Perception: Attention and perception normal.         Mood and Affect: Mood normal.         Speech: Speech normal.         Behavior: Behavior is cooperative.         Thought Content: Thought content normal.         Cognition and Memory: Cognition normal.         Judgment: Judgment normal.       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  He has been working on resuming exercising and eating healthier.    Will start Rezdiffra at the dose of 100 mg daily  Plan to monitor closely for response and adverse events which include nausea and diarrhea     We will see the patient in 3-6 months      LISSETTE Choi 08/29/24 3:48 PM

## 2024-08-29 NOTE — PROGRESS NOTES
"  Patient ID: Cirilo Wills is a 28 y.o. male who presents for FU for fatty liver.    HPI  28 year old male with fatty liver, here for FUV.  He has been on Rezdiffra for about 3 months for MASH with stage 2 fibrosis.  He has been tolerating it well without any side effects.  He has also been started on additional medications for cardiomyopathy, which was diagnosed in the spring.  He has a follow-up with cardiology soon and once he gets clearance, he will start working out.  He is eating \"less\" but not really better.  Weight is stable.    Pt has history of MS; currently on Vumerity BID for the last 4 years.  To his knowledge, his labs have only recently been elevated, primarily ALT.      Denies jaundice, icterus, itching, abdominal distension, edema, bleeding or confusion.    Denies fevers, chills, abdominal pain.       Review of Systems   Constitutional:  Negative for appetite change, chills, fever and unexpected weight change.   HENT:  Negative for mouth sores, nosebleeds, trouble swallowing and voice change.    Eyes:  Negative for visual disturbance.   Respiratory:  Negative for cough, shortness of breath and wheezing.    Cardiovascular:  Negative for chest pain, palpitations and leg swelling.   Gastrointestinal:  Negative for abdominal distention, abdominal pain, blood in stool, constipation, diarrhea, nausea and vomiting.   Genitourinary:  Negative for decreased urine volume, difficulty urinating, dysuria, frequency, hematuria and urgency.   Musculoskeletal:  Negative for gait problem and joint swelling.   Skin:  Negative for color change, pallor and rash.   Neurological:  Negative for dizziness, tremors, weakness, light-headedness, numbness and headaches.   Hematological:  Does not bruise/bleed easily.   Psychiatric/Behavioral:  Negative for agitation, behavioral problems, confusion and sleep disturbance.        Objective   Physical Exam  Constitutional:       General: He is awake.      Appearance: Normal " appearance. He is well-developed.   HENT:      Head: Normocephalic and atraumatic.      Right Ear: Hearing normal.      Left Ear: Hearing normal.      Nose: Nose normal.      Mouth/Throat:      Lips: Pink.      Mouth: Mucous membranes are moist.   Eyes:      General: Lids are normal.      Extraocular Movements: Extraocular movements intact.      Conjunctiva/sclera: Conjunctivae normal.      Pupils: Pupils are equal, round, and reactive to light.   Cardiovascular:      Rate and Rhythm: Normal rate and regular rhythm.      Pulses: Normal pulses.      Heart sounds: Normal heart sounds.   Pulmonary:      Effort: Pulmonary effort is normal.      Breath sounds: Normal breath sounds.   Abdominal:      General: Abdomen is flat. Bowel sounds are normal.      Palpations: Abdomen is soft.   Musculoskeletal:      Cervical back: Normal range of motion and neck supple.   Feet:      Right foot:      Skin integrity: Skin integrity normal.      Left foot:      Skin integrity: Skin integrity normal.   Skin:     General: Skin is warm.   Neurological:      General: No focal deficit present.      Mental Status: He is alert and oriented to person, place, and time.      Cranial Nerves: Cranial nerves 2-12 are intact.      Sensory: Sensation is intact.      Motor: Motor function is intact.      Coordination: Coordination is intact.      Gait: Gait is intact.   Psychiatric:         Attention and Perception: Attention and perception normal.         Mood and Affect: Mood normal.         Speech: Speech normal.         Behavior: Behavior is cooperative.         Thought Content: Thought content normal.         Cognition and Memory: Cognition normal.         Judgment: Judgment normal.         Current Outpatient Medications   Medication Sig Dispense Refill    atorvastatin (Lipitor) 40 mg tablet Take 1 tablet (40 mg) by mouth once daily. 90 tablet 3    carvedilol (Coreg) 6.25 mg tablet Take 1 tablet (6.25 mg) by mouth 2 times daily (morning and  late afternoon). 60 tablet 11    dapagliflozin propanediol (Farxiga) 10 mg Take 1 tablet (10 mg) by mouth once daily. 30 tablet 11    pantoprazole (ProtoNix) 20 mg EC tablet Take 1 tablet (20 mg) by mouth once daily in the morning. Take before meals. Do not crush, chew, or split. 30 tablet 11    resmetirom (Rezdiffra) 100 mg tablet Take 100 mg by mouth once daily. 30 tablet 11    sacubitriL-valsartan (Entresto) 24-26 mg tablet Take 1 tablet by mouth 2 times a day. 60 tablet 11    semaglutide, weight loss, (Wegovy) 1 mg/0.5 mL pen injector Inject 1 mg under the skin every 7 days. 2 mL 5    semaglutide, weight loss, 1.7 mg/0.75 mL pen injector Inject 1.7 mg under the skin every 7 days. 3 mL 0    spironolactone (Aldactone) 25 mg tablet Take 1 tablet (25 mg) by mouth once daily. 30 tablet 11    Vumerity 231 mg capsule,delayed release(DR/EC) TAKE 2 CAPSULES BY MOUTH 2 TIMES A DAY. 120 capsule 5     No current facility-administered medications for this visit.     LABS:   Lab Results   Component Value Date    ALBUMIN 4.7 08/24/2024    BILITOT 0.9 07/16/2024    BILIDIR 0.0 03/18/2024    ALKPHOS 51 07/16/2024    ALT 65 (H) 07/16/2024    AST 30 07/16/2024    PROT 7.1 07/16/2024      Lab Results   Component Value Date    WBC 9.5 07/16/2024    HGB 14.5 07/16/2024    HCT 42.2 07/16/2024    MCV 90 07/16/2024     07/16/2024            Assessment/Plan   Problem List Items Addressed This Visit             ICD-10-CM    SEBASTIAN (nonalcoholic steatohepatitis) - Primary K75.81     28 year old with MASH and F2 fibrosis.  Currently on Rezdiffra 100mg daily.  Tolerating well.  He has been encouraged to work on resuming exercising and eating healthier.    Will update fibroscan in November with labs.  Based on these findings, will plan FU in 6 months.         Relevant Orders    CBC and Auto Differential    Comprehensive Metabolic Panel    Liver Elastography (Fibroscan)            YEYO Choi-CNP 08/29/24 4:04 PM

## 2024-08-30 DIAGNOSIS — G35 MULTIPLE SCLEROSIS (MULTI): ICD-10-CM

## 2024-08-30 RX ORDER — DIROXIMEL FUMARATE 231 MG/1
2 CAPSULE ORAL 2 TIMES DAILY
Qty: 120 CAPSULE | Refills: 5 | Status: SHIPPED | OUTPATIENT
Start: 2024-08-30

## 2024-08-31 ENCOUNTER — PHARMACY VISIT (OUTPATIENT)
Dept: PHARMACY | Facility: CLINIC | Age: 29
End: 2024-08-31
Payer: MEDICARE

## 2024-09-13 ENCOUNTER — APPOINTMENT (OUTPATIENT)
Dept: CARDIOLOGY | Facility: CLINIC | Age: 29
End: 2024-09-13
Payer: COMMERCIAL

## 2024-09-26 ENCOUNTER — OFFICE VISIT (OUTPATIENT)
Dept: CARDIOLOGY | Facility: CLINIC | Age: 29
End: 2024-09-26
Payer: COMMERCIAL

## 2024-09-26 VITALS
SYSTOLIC BLOOD PRESSURE: 122 MMHG | OXYGEN SATURATION: 98 % | HEIGHT: 72 IN | HEART RATE: 94 BPM | BODY MASS INDEX: 41.58 KG/M2 | DIASTOLIC BLOOD PRESSURE: 76 MMHG | WEIGHT: 307 LBS

## 2024-09-26 DIAGNOSIS — I50.9 CHRONIC CONGESTIVE HEART FAILURE, UNSPECIFIED HEART FAILURE TYPE: ICD-10-CM

## 2024-09-26 DIAGNOSIS — I42.0 DILATED CARDIOMYOPATHY (MULTI): Primary | ICD-10-CM

## 2024-09-26 DIAGNOSIS — I50.9 CONGESTIVE HEART FAILURE, UNSPECIFIED HF CHRONICITY, UNSPECIFIED HEART FAILURE TYPE: ICD-10-CM

## 2024-09-26 DIAGNOSIS — R73.03 PREDIABETES: ICD-10-CM

## 2024-09-26 PROCEDURE — 3008F BODY MASS INDEX DOCD: CPT | Performed by: STUDENT IN AN ORGANIZED HEALTH CARE EDUCATION/TRAINING PROGRAM

## 2024-09-26 PROCEDURE — 99215 OFFICE O/P EST HI 40 MIN: CPT | Performed by: STUDENT IN AN ORGANIZED HEALTH CARE EDUCATION/TRAINING PROGRAM

## 2024-09-26 PROCEDURE — 1036F TOBACCO NON-USER: CPT | Performed by: STUDENT IN AN ORGANIZED HEALTH CARE EDUCATION/TRAINING PROGRAM

## 2024-09-26 ASSESSMENT — ENCOUNTER SYMPTOMS
DECREASED APPETITE: 0
SYNCOPE: 0
COUGH: 0
WEIGHT GAIN: 0
SHORTNESS OF BREATH: 0
PND: 0
PALPITATIONS: 0
WEIGHT LOSS: 0
DYSPNEA ON EXERTION: 1
BRUISES/BLEEDS EASILY: 0
PARESTHESIAS: 1
ALTERED MENTAL STATUS: 0
NEAR-SYNCOPE: 0
OCCASIONAL FEELINGS OF UNSTEADINESS: 0
ORTHOPNEA: 0
HEMATOCHEZIA: 0
HEMATURIA: 0
MEMORY LOSS: 0
HEMATEMESIS: 0

## 2024-09-26 NOTE — PROGRESS NOTES
"Referring Clinician: Dr. Zackery Tabares  Cardiologist: Dr Huoston  Accompanied by: alone     HPI:     29 y.o. [facility]  who presents for advanced heart failure care.  He has a past medical history significant for hyperlipidemia, hypertension, multiple sclerosis (quiescent, no flares since ~2015), hypothyroidism, obstructive sleep apnea, GERD, obesity with BMI 42 kg/m² maintained on semaglutide.  He has a history of HFrEF and on TTE 3/2024 LVEF 25-30 % LVIDD 5.5 cm, normal right ventricular systolic function.  On cardiac MRI (NOT STRESS EVALUATED), he had LVEF 30%, normal right ventricular systolic function.  Myocardium consistent with NICM, and possible evidence of past myocarditis.  He has a history of having had mild COVIUD May 2019 which did not require specific therapeutics, or hospitalization.  Negative autoimmune screen 4/2024  Last visit he was initiated on pharmacotherapy for HFrEF.  CTA coronaries 7/2024 showed normal coronary circulation and a calcium score of 0.  He was also referred to the sleep medicine team, and has seen them between visits.  Plans for resumption of CPAP.  He was referred to cardiac rehabilitation  but was denied at a [facility] facility.    He has been tolerating heart failure pharmacotherapy    He is currently asymptomatic but does report that he has not had ongoing exertional dyspnea that he noted when he was helping a friend move some boxes last week.  He is able to climb flights of stairs without exertional symptoms but is winded after 1 flight.  He denies orthopnea, PND, leg swelling, syncope, presyncope.    He was initiated on PPI, and this helps \"greatly\" no longer with GERD symptoms.    He is fully adherent with all prescribed medications.    He has never been hospitalized with heart failure, or cardiac complaints.    Surgical Hx:  - Nil    Social Hx:  - Smoking- nil  - THC vape pen use since 2022, uses  3-4 times a week  - ETOH- now 1 glass whisky a week, previously used 3-4 " "glasses of whiskey a week  - Illicit drugs- never   - Lives with brother, and parents.  Feels safe at home    Family Hx:  Specifically, there is no family history of  CAD, heart failure, ICD, PPM, LVAD, OHT, arrhythmias,  or sudden cardiac death.    Brother-\"weak heart beat\" as a child ( now 31 years, appears well)  Cousin- had multiple heart surgeries as a child , now is well ( now  30 years)  Maternal gfather- CVA in his 60s ( smoker\"    Medication reconciliation completed, see below.     Medication Documentation Review Audit       Reviewed by Jocelyn Broderick RN (Registered Nurse) on 09/26/24 at 1247      Medication Order Taking? Sig Documenting Provider Last Dose Status   atorvastatin (Lipitor) 40 mg tablet [id number] Yes Take 1 tablet (40 mg) by mouth once daily. Blessing Fisher MD Taking Active   carvedilol (Coreg) 6.25 mg tablet [id number] Yes Take 1 tablet (6.25 mg) by mouth 2 times daily (morning and late afternoon). Monique Blevins MD PhD Taking Active   dapagliflozin propanediol (Farxiga) 10 mg [id number] Yes Take 1 tablet (10 mg) by mouth once daily. Monique Blevins MD PhD Taking Active   pantoprazole (ProtoNix) 20 mg EC tablet [id number] Yes Take 1 tablet (20 mg) by mouth once daily in the morning. Take before meals. Do not crush, chew, or split. Monique Blevins MD PhD Taking Active   resmetirom (Rezdiffra) 100 mg tablet [id number] Yes Take 100 mg by mouth once daily. Cheyanne Cali, APRN-CNP Taking Active   sacubitriL-valsartan (Entresto) 24-26 mg tablet [id number] Yes Take 1 tablet by mouth 2 times a day. Monique Blevins MD PhD Taking Active   semaglutide, weight loss, (Wegovy) 1 mg/0.5 mL pen injector [id number]  Inject 1 mg under the skin every 7 days. Blessing Fisher MD  Active   semaglutide, weight loss, 1.7 mg/0.75 mL pen injector [id number] Yes Inject 1.7 mg under the skin every 7 days. Blessing Fisher MD Taking Active   spironolactone (Aldactone) 25 mg tablet " [id number] Yes Take 1 tablet (25 mg) by mouth once daily. Monique Blevins MD PhD Taking Active   Vumerity 231 mg capsule,delayed release(DR/EC) [id number] Yes TAKE 2 CAPSULES BY MOUTH 2 TIMES A DAY. Dunia Whitmore APRN-CNP Taking Active                   No Known Allergies       ROS   Negative except for as detailed in HPI    Review of Systems   Constitutional: Negative for decreased appetite, weight gain and weight loss.   HENT:  Negative for hearing loss.    Eyes:  Negative for visual disturbance.   Cardiovascular:  Positive for dyspnea on exertion. Negative for chest pain, leg swelling, near-syncope, orthopnea, palpitations, paroxysmal nocturnal dyspnea and syncope.   Respiratory:  Negative for cough and shortness of breath.    Hematologic/Lymphatic: Does not bruise/bleed easily.   Skin:  Negative for rash.   Musculoskeletal:  Negative for joint pain.   Gastrointestinal:  Negative for hematemesis, hematochezia and melena.   Genitourinary:  Negative for hematuria.   Neurological:  Positive for paresthesias.   Psychiatric/Behavioral:  Negative for altered mental status and memory loss.         Investigations:    The electronic medical record has been reviewed by me for salient history. All cardiovascular imaging and testing available in the electronic medical record, and [employer] has been reviewed.    Visit Vitals  /76   Pulse 94   Ht 1.829 m (6')   Wt 139 kg (307 lb)   SpO2 98%   BMI 41.64 kg/m²   Smoking Status Former   BSA 2.66 m²      On examination:    Very pleasant obese young -American man in no apparent CP or painful distress  Well groomed   Neck: No JVD or HJR  CVS: HS 1,2.   Few ectopic beats.  No added sounds  Resp: CTA bilaterally. Percussion note resonant  Abdomen: Obese, SNT, BS wnl  Extremities: No pedal oedema  Skin: warm and dry  CNS: AO x 4, no gross deficits  Lab Results   Component Value Date    WBC 9.5 07/16/2024    HGB 14.5 07/16/2024    HCT 42.2 07/16/2024    MCV 90 07/16/2024      07/16/2024       Chemistry    Lab Results   Component Value Date/Time     08/24/2024 0932    K 4.2 08/24/2024 0932     08/24/2024 0932    CO2 27 08/24/2024 0932    BUN 10 08/24/2024 0932    CREATININE 0.80 08/24/2024 0932    Lab Results   Component Value Date/Time    CALCIUM 9.8 08/24/2024 0932    ALKPHOS 51 07/16/2024 1307    AST 30 07/16/2024 1307    ALT 65 (H) 07/16/2024 1307    BILITOT 0.9 07/16/2024 1307           Transthoracic Echo (TTE) Complete    Result Date: 3/28/2024   [facility], [address] [place] [address], [address] [address]                  Tel [phone] and Fax [phone] TRANSTHORACIC ECHOCARDIOGRAM REPORT  Patient Name:      ARTURO Zamora Physician:    [id number] Lucia Houston MD Study Date:        3/27/2024            Ordering Provider:    [id number] PETER FUNK MRN/PID:           [id number]             Fellow: Accession#:        [id number]         Nurse:                Juanita Oneill RN Date of Birth/Age: 1995 / 28 years Sonographer:          Italo Bullock                                                               [facility] Gender:            M                    Additional Staff: Height:            185.42 cm            Admit Date: Weight:            150.59 kg            Admission Status:     Outpatient BSA / BMI:         2.67 m2 / 43.80      Encounter#:           [id number]                    kg/m2                                         Department Location:  [facility] Blood Pressure: 134 /81 mmHg Study Type:    TRANSTHORACIC ECHO (TTE) COMPLETE Diagnosis/ICD: Other forms of dyspnea-R06.09 Indication:    JIMÉNEZ CPT Code:      Echo Complete w Full Doppler-[id number] Patient History: Pertinent History: Abnormal EKG, JIMÉNEZ, HLD, DM, Hypothyroidism, KG, Family hx of                    heart disease. Study Detail: The following Echo studies were performed: 2D, M-Mode, Doppler and                color flow. Technically challenging study due to prominent lung               artifact and body habitus. Definity used as a contrast agent for               endocardial border definition. Total contrast used for this               procedure was 3.0 mL via IV push.  PHYSICIAN INTERPRETATION: Left Ventricle: The left ventricular systolic function is severely decreased, with an estimated ejection fraction of 25-30%. There are no regional wall motion abnormalities. The left ventricular cavity size is normal. Spectral Doppler shows an impaired relaxation pattern of left ventricular diastolic filling. Left Atrium: The left atrium is mildly dilated. Right Ventricle: The right ventricle is normal in size. There is normal right ventricular global systolic function. Right Atrium: The right atrium is normal in size. Aortic Valve: The aortic valve was not well visualized. There is no evidence of aortic valve regurgitation. The peak instantaneous gradient of the aortic valve is 6.0 mmHg. The mean gradient of the aortic valve is 3.8 mmHg. Mitral Valve: The mitral valve is normal in structure. There is trace mitral valve regurgitation. Tricuspid Valve: The tricuspid valve is structurally normal. There is trace to mild tricuspid regurgitation. Pulmonic Valve: The pulmonic valve is not well visualized. There is no indication of pulmonic valve regurgitation. Pericardium: There is no pericardial effusion noted. Aorta: The aortic root is normal.  CONCLUSIONS:  1. Left ventricular systolic function is severely decreased with a 25-30% estimated ejection fraction.  2. Spectral Doppler shows an impaired relaxation pattern of left ventricular diastolic filling. QUANTITATIVE DATA SUMMARY: 2D MEASUREMENTS:                          Normal Ranges: LAs:           4.41 cm   (2.7-4.0cm) RVIDd:         2.73 cm   (0.9-3.6cm) IVSd:          1.09 cm   (0.6-1.1cm) LVPWd:         0.90 cm   (0.6-1.1cm) LVIDd:         5.55 cm   (3.9-5.9cm) LVIDs:          4.36 cm LV Mass Index: 80.5 g/m2 LV % FS        21.4 % LA VOLUME:                               Normal Ranges: LA Vol A4C:        91.1 ml    (22+/-6mL/m2) LA Vol A2C:        109.8 ml LA Vol BP:         102.4 ml LA Vol Index A4C:  34.1 ml/m2 LA Vol Index A2C:  41.1 ml/m2 LA Vol Index BP:   38.4 ml/m2 LA Area A4C:       26.4 cm2 LA Area A2C:       28.3 cm2 LA Major Axis A4C: 6.5 cm LA Major Axis A2C: 6.2 cm LA Vol A4C:        79.9 ml LA Vol A2C:        104.7 ml RA VOLUME BY A/L METHOD:                               Normal Ranges: RA Vol A4C:        60.0 ml    (8.3-19.5ml) RA Vol Index A4C:  22.5 ml/m2 RA Area A4C:       18.4 cm2 RA Major Axis A4C: 4.8 cm AORTA MEASUREMENTS:                    Normal Ranges: Asc Ao, d: 3.00 cm (2.1-3.4cm) LV SYSTOLIC FUNCTION BY 2D PLANIMETRY (MOD):                     Normal Ranges: EF-A4C View: 29.1 % (>=55%) EF-A2C View: 30.0 % EF-Biplane:  29.6 % LV DIASTOLIC FUNCTION:                            Normal Ranges: MV Peak E:      0.98 m/s   (0.7-1.2 m/s) MV Peak A:      0.66 m/s   (0.42-0.7 m/s) E/A Ratio:      1.50       (1.0-2.2) MV e'           0.11 m/s   (>8.0) MV lateral e'   0.13 m/s MV medial e'    0.08 m/s E/e' Ratio:     8.95       (<8.0) PulmV Sys Coleman:  55.09 cm/s PulmV Ocampo Coleman: 46.63 cm/s PulmV S/D Coleman:  1.18 MITRAL VALVE:                 Normal Ranges: MV DT: 104 msec (150-240msec) AORTIC VALVE:                                   Normal Ranges: AoV Vmax:                1.22 m/s (<=1.7m/s) AoV Peak P.0 mmHg (<20mmHg) AoV Mean PG:             3.8 mmHg (1.7-11.5mmHg) LVOT Max Coleman:            0.70 m/s (<=1.1m/s) AoV VTI:                 21.14 cm (18-25cm) LVOT VTI:                11.92 cm LVOT Diameter:           2.01 cm  (1.8-2.4cm) AoV Area, VTI:           1.79 cm2 (2.5-5.5cm2) AoV Area,Vmax:           1.82 cm2 (2.5-4.5cm2) AoV Dimensionless Index: 0.56  RIGHT VENTRICLE: RV Basal 3.90 cm RV Mid   2.70 cm RV Major 8.1 cm TAPSE:   16.9 mm RV s'    0.18 m/s  PULMONIC VALVE:                      Normal Ranges: PV Max Coleman: 1.1 m/s  (0.6-0.9m/s) PV Max P.4 mmHg Pulmonary Veins: PulmV Ocampo Coleman: 46.63 cm/s PulmV S/D Coleman:  1.18 PulmV Sys Coleman:  55.09 cm/s  [id number] Lucia Houston MD Electronically signed on 3/28/2024 at 7:09:31 PM  ** Final (Updated) **        CTA cors 2024  1.  Normal coronary anatomy without evidence of atherosclerotic  changes or stenotic disease.  2. Coronary artery calcium score is 0.    IMPRESSION:      29 y.o. [facility]  who presents for advanced heart failure care.  He has a past medical history significant for hyperlipidemia, hypertension, multiple sclerosis (quiescent, no flares since ~), hypothyroidism, obstructive sleep apnea, GERD, obesity with BMI 42 kg/m² maintained on semaglutide.  He has a history of HFrEF and on TTE 3/2024 LVEF 25-30 % LVIDD 5.5 cm, normal right ventricular systolic function.  On cardiac MRI (NOT STRESS EVALUATED), he had LVEF 30%, normal right ventricular systolic function.  Myocardium consistent with NICM, and possible evidence of past myocarditis.  He has a history of having had mild COVIUD May 2019 which did not require specific therapeutics, or hospitalization.  Negative autoimmune screen 2024  Last visit he was initiated on pharmacotherapy for HFrEF.  CTA coronaries 2024 showed normal coronary circulation and a calcium score of 0.  He was also referred to the sleep medicine team, and has seen them between visits.  Plans for resumption of CPAP.  He was referred to cardiac rehabilitation  but was denied at a [facility] facility.    NYHA Functional Class: 2-3  ACC/AHA Stage C heart failure  Volume status: euvolemic   Perfusion status: Warm to touch  Aetiology: Non ischemic     PLAN:  #HFrEF  -Plan for TTE, cardiopulmonary exercise test after heart failure pharmacotherapy is at goal  -Medication optimization, as detailed below  -He was referred to cardiac rehabilitation again today    -Medication  optimisation:  BB: Continue  carvedilol to 6.25 mg twice daily  RAASi: Increase to sacubitril/valsartan 49/51 mg twice daily  AA: Continue  spironolactone 25 mg once daily  SGLT2i: Continue  dapagliflozin 10 mg once daily    COUNSELING:   We discussed the following non-pharmacological measures during this visit:  ·Smoking and alcohol abstinence/cessation, if applicable.  ·Dietary and medication compliance (in particular, salt restriction)  ·Monitoring daily weights and blood pressures  ·Exercise regimen (walking)    Heart Failure Education Booklet given: 7/1/2024    #Palpitations  -Ambulatory ECG monitor, 3 days.  Placed today  -Check thyroid function    #Obesity  -Continue semaglutide  -Referred to the [facility] weight loss team prev    #Obstructive sleep apnea  - Per Sleep medicine team     This note was transcribed using the Dragon Dictation system. There may be grammatical, punctuation, or verbiage errors that can occur with voice recognition programs.    Monique Blevins MD PhD

## 2024-09-26 NOTE — PATIENT INSTRUCTIONS
Thank you for coming in today. If you have any questions or concerns, you may call the Heart Failure Office at [phone] option [phone], or [phone].  You may also contact our heart failure nursing team via email on [email or web address].    For quicker results set-up your  [employer] account to receive results and other correspondence directly to your phone.    Please bring all your pills/medications to your Cardiology appointments.    **  - Please check your blood pressure a few times a week and have this log available at your future cardiology visits      - Please make the following medication changes:  1.  INCREASE Entresto to 49/51 mg twice daily    - Please have the following tests done:  1.Blood tests just before your next visit (RFP, BNP)    -You will be referred to the following teams, CALL  [phone] to schedule your appointments with:  1. Cardiac rehabilitation , call the team at [facility] directly on Tel [phone]    - Please make an appointment to be seen in 4 weeks (VIRTUAL)

## 2024-09-29 RX ORDER — SEMAGLUTIDE 1.7 MG/.75ML
1.7 INJECTION, SOLUTION SUBCUTANEOUS
Qty: 3 ML | Refills: 5 | Status: SHIPPED | OUTPATIENT
Start: 2024-09-29

## 2024-09-30 PROCEDURE — RXMED WILLOW AMBULATORY MEDICATION CHARGE

## 2024-10-05 ENCOUNTER — PHARMACY VISIT (OUTPATIENT)
Dept: PHARMACY | Facility: CLINIC | Age: 29
End: 2024-10-05
Payer: MEDICARE

## 2024-10-07 ENCOUNTER — HOSPITAL ENCOUNTER (OUTPATIENT)
Dept: RADIOLOGY | Facility: CLINIC | Age: 29
Discharge: HOME | End: 2024-10-07
Payer: COMMERCIAL

## 2024-10-07 DIAGNOSIS — G35 MULTIPLE SCLEROSIS (MULTI): ICD-10-CM

## 2024-10-07 PROCEDURE — 2550000001 HC RX 255 CONTRASTS: Performed by: NURSE PRACTITIONER

## 2024-10-07 PROCEDURE — 70553 MRI BRAIN STEM W/O & W/DYE: CPT

## 2024-10-07 PROCEDURE — 70553 MRI BRAIN STEM W/O & W/DYE: CPT | Performed by: RADIOLOGY

## 2024-10-07 PROCEDURE — A9575 INJ GADOTERATE MEGLUMI 0.1ML: HCPCS | Performed by: NURSE PRACTITIONER

## 2024-10-07 RX ORDER — GADOTERATE MEGLUMINE 376.9 MG/ML
20 INJECTION INTRAVENOUS
Status: COMPLETED | OUTPATIENT
Start: 2024-10-07 | End: 2024-10-07

## 2024-10-09 ENCOUNTER — APPOINTMENT (OUTPATIENT)
Dept: CARDIOLOGY | Facility: HOSPITAL | Age: 29
End: 2024-10-09
Payer: COMMERCIAL

## 2024-10-14 ENCOUNTER — CLINICAL SUPPORT (OUTPATIENT)
Dept: CARDIAC REHAB | Facility: CLINIC | Age: 29
End: 2024-10-14
Payer: COMMERCIAL

## 2024-10-14 VITALS
SYSTOLIC BLOOD PRESSURE: 104 MMHG | HEIGHT: 72 IN | DIASTOLIC BLOOD PRESSURE: 66 MMHG | BODY MASS INDEX: 41.93 KG/M2 | OXYGEN SATURATION: 96 % | WEIGHT: 309.6 LBS

## 2024-10-14 DIAGNOSIS — I42.0 DILATED CARDIOMYOPATHY (MULTI): ICD-10-CM

## 2024-10-14 DIAGNOSIS — I50.9 CONGESTIVE HEART FAILURE, UNSPECIFIED HF CHRONICITY, UNSPECIFIED HEART FAILURE TYPE: ICD-10-CM

## 2024-10-14 ASSESSMENT — PATIENT HEALTH QUESTIONNAIRE - PHQ9
1. LITTLE INTEREST OR PLEASURE IN DOING THINGS: NEARLY EVERY DAY
8. MOVING OR SPEAKING SO SLOWLY THAT OTHER PEOPLE COULD HAVE NOTICED. OR THE OPPOSITE, BEING SO FIGETY OR RESTLESS THAT YOU HAVE BEEN MOVING AROUND A LOT MORE THAN USUAL: NOT AT ALL
2. FEELING DOWN, DEPRESSED OR HOPELESS: SEVERAL DAYS
5. POOR APPETITE OR OVEREATING: NOT AT ALL
7. TROUBLE CONCENTRATING ON THINGS, SUCH AS READING THE NEWSPAPER OR WATCHING TELEVISION: NOT AT ALL
SUM OF ALL RESPONSES TO PHQ9 QUESTIONS 1 & 2: 4
3. TROUBLE FALLING OR STAYING ASLEEP OR SLEEPING TOO MUCH: NOT AT ALL
4. FEELING TIRED OR HAVING LITTLE ENERGY: SEVERAL DAYS
6. FEELING BAD ABOUT YOURSELF - OR THAT YOU ARE A FAILURE OR HAVE LET YOURSELF OR YOUR FAMILY DOWN: SEVERAL DAYS
9. THOUGHTS THAT YOU WOULD BE BETTER OFF DEAD, OR OF HURTING YOURSELF: NOT AT ALL
SUM OF ALL RESPONSES TO PHQ QUESTIONS 1-9: 6

## 2024-10-14 ASSESSMENT — DUKE ACTIVITY SCORE INDEX (DASI)
CAN YOU PARTICIPATE IN STRENOUS SPORTS LIKE SWIMMING, SINGLES TENNIS, FOOTBALL, BASKETBALL, OR SKIING: NO
CAN YOU TAKE CARE OF YOURSELF (EAT, DRESS, BATHE, OR USE TOILET): YES
DASI METS SCORE: 7
CAN YOU DO MODERATE WORK AROUND THE HOUSE LIKE VACUUMING, SWEEPING FLOORS OR CARRYING GROCERIES: YES
CAN YOU DO HEAVY WORK AROUND THE HOUSE LIKE SCRUBBING FLOORS OR LIFTING AND MOVING HEAVY FURNITURE: NO
TOTAL_SCORE: 34.7
CAN YOU DO LIGHT WORK AROUND THE HOUSE LIKE DUSTING OR WASHING DISHES: YES
CAN YOU WALK INDOORS, SUCH AS AROUND YOUR HOUSE: YES
CAN YOU DO YARD WORK LIKE RAKING LEAVES, WEEDING OR PUSHING A MOWER: YES
CAN YOU HAVE SEXUAL RELATIONS: YES
CAN YOU RUN A SHORT DISTANCE: NO
CAN YOU CLIMB A FLIGHT OF STAIRS OR WALK UP A HILL: YES
CAN YOU WALK A BLOCK OR TWO ON LEVEL GROUND: YES
CAN YOU PARTICIPATE IN MODERATE RECREATIONAL ACTIVITIES LIKE GOLF, BOWLING, DANCING, DOUBLES TENNIS OR THROWING A BASEBALL OR FOOTBALL: YES

## 2024-10-14 NOTE — PROGRESS NOTES
Cardiac Rehabilitation Initial Treatment Plan    Name: Cirilo Wills   Medical Record Number: [id number]  YOB: 1995   Age: 29 y.o.  Today’s Date: 10/14/2024   Primary Care Physician: Blessing Fisher MD   Referring Physician: Monique Blevins MD*   Program Location: [address]  Primary Diagnosis:   1. Dilated cardiomyopathy (Multi)  Referral to Cardiac Rehab      2. Congestive heart failure, unspecified HF chronicity, unspecified heart failure type  Referral to Cardiac Rehab         Onset/Date of Diagnosis: 06/08/24   Session #: 0  AACVPR Risk Stratification: Moderate   Falls Risk: Low    Psychosocial Initial Assessment:   Pre PH-Q 9: 6  Sent PH-Q 9 to MD if score > 20: No; score < 20    Pt reported/currently experiencing stress: Yes; Depression; Severity: mild  Patient uses stress management skills: Yes   History of: depression  Currently seeing a mental health provider: No  Social Support: Yes, Whom: Family  Quality of Life Survey: KCCQ (see Heart Failure Management below)  Learning Assessment:  Learning assessment/barriers: None  Preferred learning method: Auditory, Visual, Reading handout, and Writing handout  Barriers: None  Comments:    Stages of Change: Preparation    Psychosocial Plan  Goal Status: Initial Assessment; goals not yet started  Psychosocial Goals: Demonstrating proper techniques for stress management, Maintain or lower PH-Q 9 score by discharge, and Identify strategies for managing depression    Psychosocial Interventions/Education:   To be done in Cardiac Rehab.    Nutrition Initial Assessment:    Diet Habit Survey: Picture Your Plate  Pre: 66  Post: To be done at discharge.    Survey results reviewed with Dietician: Not at this time    Lipids Assessment  Current Dietary Guidelines: Low sodium  Barriers to dietary change: no  Hyperlipidemia: No   Lab Results   Component Value Date    CHOL 114 07/16/2024    HDL 35.9 07/16/2024    LDLCALC 66  "07/16/2024    TRIG 61 07/16/2024     Diabetes Assessment  History of Diabetes: No  Lab Results   Component Value Date    HGBA1C 5.3 06/08/2024      Weight Management  Weight: 140 kg (309 lb 9.6 oz)  Height: 182.9 cm (6' 0.01\")  BMI (Calculated): 41.98   Nutrition Plan  Goal Status: Initial Assessment; goals not yet started  Nutrition Goals: Improve Diet Habit Survey score by 5-10 points by discharge, Adapt a low-sodium, DASH diet prior to discharge, Adapt a Mediterranean focused diet prior to discharge, Learn how to read and interpret nutrition labels prior to discharge, and Lose 1lb/week while enrolled in program    Nutrition Interventions/Education:   To be done in Cardiac Rehab.    Exercise Initial Assessment:  Current Home Exercise: No    Exercise Prescription  Exercise Prescription based on:   [facility] Activity Status Index (DASI)  DASI Score: 34.7   MET Score: 7       Frequency:  3 days/week  Mode: Treadmill, NuStep, and Recumbent Cycle  Duration: 24 total aerobic minutes  Intensity: RPE 12-16  Target HR:  To be calculated after 6 attended sessions.  MET Level: 2.6  Patient wears supplemental O2: No   Modality Workload METs Duration (minutes)   1 Pre-Exercise      2 Session Warm Up   5 : 00   3 Treadmill 2.0 mph @ 0 % 2.6 8 : 00   4 NuStep 4000 3 load @ 50 jacobs 2.2 8 : 00   5 Recumbent Bike 2 load @ 55 rpms 2.2 8 : 00   6 Post-Exercise         Resistance Training: No   Home Exercise Prescription given: To be given at session # 6    Exercise Plan  Goal Status: Initial Assessment; goals not yet started  Exercise Goals: Increase exercise MET level by 5-10% each week, Increase total exercise duration to 30-45 minutes, Obtain 150 minutes/week of moderate intensity aerobic exercise, Initiate strength training 2-3 days a week, Initiate flexibility training 2-3 days a week, and Establish a home exercise program before discharge    Exercise Interventions/Education:   To be done in Cardiac Rehab.    Other Core " Components/Risk Factor Initial Assessment:  Medication adherence  Medication compliance: Yes  Uses pill box/organizer: Yes   Carries medication list: Yes   Current Medications:   Medication Documentation Review Audit       Reviewed by Monique Blevins MD PhD (Physician) on 09/26/24 at 1325      Medication Order Taking? Sig Documenting Provider Last Dose Status   atorvastatin (Lipitor) 40 mg tablet [id number] Yes Take 1 tablet (40 mg) by mouth once daily. Blessing Fisher MD Taking Active   carvedilol (Coreg) 6.25 mg tablet [id number] Yes Take 1 tablet (6.25 mg) by mouth 2 times daily (morning and late afternoon). Monique Blevins MD PhD Taking Active   dapagliflozin propanediol (Farxiga) 10 mg [id number] Yes Take 1 tablet (10 mg) by mouth once daily. Monique Blevins MD PhD Taking Active   pantoprazole (ProtoNix) 20 mg EC tablet [id number] Yes Take 1 tablet (20 mg) by mouth once daily in the morning. Take before meals. Do not crush, chew, or split. Monique Blevins MD PhD Taking Active   resmetirom (Rezdiffra) 100 mg tablet [id number] Yes Take 100 mg by mouth once daily. Cheyanne Cali, APRN-CNP Taking Active   Discontinued 09/26/24 1317   sacubitriL-valsartan (Entresto) 49-51 mg tablet [id number]  Take 1 tablet by mouth 2 times a day. Monique Blevins MD PhD  Active     Discontinued 09/26/24 1325   semaglutide, weight loss, 1.7 mg/0.75 mL pen injector [id number] Yes Inject 1.7 mg under the skin every 7 days. Blessing Fisher MD Taking Active   spironolactone (Aldactone) 25 mg tablet [id number] Yes Take 1 tablet (25 mg) by mouth once daily. Monique Blevins MD PhD Taking Active   Vumerity 231 mg capsule,delayed release(DR/EC) [id number] Yes TAKE 2 CAPSULES BY MOUTH 2 TIMES A DAY. Dunia Whitmoer, APRN-CNP Taking Active                   Is patient prescribed Nitroglycerin? No  Blood Pressure Management  History of Hypertension: Yes   Medication Changes: No   Resting BP:  Visit Vitals  BP  104/66       Heart Failure Management  Hx of Heart Failure: Yes, Type (selection): HFrEF  Most recent EF: Echocardiogram - LVEF (%): 30     Onset of heart failure diagnosis: 6/8/24  Last heart failure hospitalization: None  Number of HF admissions per year: 0    Symptoms: Fatigue with exertion and Shortness of breath  Is there a family Hx of HF: No   Does patient obtain daily weight No     KCCQ survey: Pre: TBD  Post: TBD    Heart Failure Reassessment Heart Failure Symptom Management: Initial Treatment Plan. Will reassess in 30 days.    Heart Failure Goals: Able to verbalize signs and symptoms of fluid retention and when to contact MD, Adapt a low sodium diet and verbalize guidelines, Obtain daily weight and verbalize proper method of obtaining weights    Smoking/Tobacco Assessment  Social History     Tobacco Use    Smoking status: Former     Types: Cigarettes    Smokeless tobacco: Never   Substance Use Topics    Alcohol use: Yes     Alcohol/week: 3.0 standard drinks of alcohol     Types: 3 Cans of beer per week    Drug use: Yes     Types: Marijuana      Other Core Component Plan  Goal Status: Initial Assessment; goals not yet started  Other Core Component Goals: Verbalize medication usage and drug actions by discharge, Begin tobacco cessation program, Set tobacco cessation quit date while enrolled, and Achieve resting BP of < 130/80 by discharge    Other Core Component Interventions/Education:   To be done in Cardiac Rehab.    Individual Patient Goals:  Climb 2 flight of stairs without getting tired.   Lose weight by 5 lb.   Goal Status: Initial Assessment; goals not yet started    Staff Comments:  Initial assessment done In Person at [facility] for Cardiac Rehab. Patient would like to attend sessions at 8:00 AM and has been put on the wait list pending class time availability.      Rehab Staff Signature: WALTER Do

## 2024-10-18 DIAGNOSIS — I50.22 CHRONIC SYSTOLIC HEART FAILURE: Primary | ICD-10-CM

## 2024-10-21 ENCOUNTER — CLINICAL SUPPORT (OUTPATIENT)
Dept: CARDIAC REHAB | Facility: CLINIC | Age: 29
End: 2024-10-21
Payer: COMMERCIAL

## 2024-10-21 DIAGNOSIS — I50.22 CHRONIC SYSTOLIC HEART FAILURE: ICD-10-CM

## 2024-10-21 PROCEDURE — 93798 PHYS/QHP OP CAR RHAB W/ECG: CPT | Performed by: INTERNAL MEDICINE

## 2024-10-23 ENCOUNTER — CLINICAL SUPPORT (OUTPATIENT)
Dept: CARDIAC REHAB | Facility: CLINIC | Age: 29
End: 2024-10-23
Payer: COMMERCIAL

## 2024-10-23 DIAGNOSIS — I50.22 CHRONIC SYSTOLIC HEART FAILURE: ICD-10-CM

## 2024-10-23 PROCEDURE — 93798 PHYS/QHP OP CAR RHAB W/ECG: CPT | Performed by: INTERNAL MEDICINE

## 2024-10-23 NOTE — PROGRESS NOTES
Cardiac Rehab Education  Cirilo Wills   [id number]    10/23/2024  Risk factors of cardiovascular disease were discussed today's. Education on modifiable and non-modifiable risk factors of CVD and recommendations to manage them was done today. Handouts were provided along with further reference. Patient was educated to comeback with further questions.     Signature Amelia Taylor EP

## 2024-10-25 ENCOUNTER — CLINICAL SUPPORT (OUTPATIENT)
Dept: CARDIAC REHAB | Facility: CLINIC | Age: 29
End: 2024-10-25
Payer: COMMERCIAL

## 2024-10-25 DIAGNOSIS — I50.22 CHRONIC SYSTOLIC HEART FAILURE: ICD-10-CM

## 2024-10-25 PROCEDURE — 93798 PHYS/QHP OP CAR RHAB W/ECG: CPT | Performed by: INTERNAL MEDICINE

## 2024-10-28 ENCOUNTER — CLINICAL SUPPORT (OUTPATIENT)
Dept: CARDIAC REHAB | Facility: CLINIC | Age: 29
End: 2024-10-28
Payer: COMMERCIAL

## 2024-10-28 DIAGNOSIS — I50.22 CHRONIC SYSTOLIC HEART FAILURE: ICD-10-CM

## 2024-10-28 PROCEDURE — 93798 PHYS/QHP OP CAR RHAB W/ECG: CPT | Performed by: INTERNAL MEDICINE

## 2024-10-29 ENCOUNTER — HOSPITAL ENCOUNTER (OUTPATIENT)
Dept: RADIOLOGY | Facility: CLINIC | Age: 29
Discharge: HOME | End: 2024-10-29
Payer: COMMERCIAL

## 2024-10-29 ENCOUNTER — LAB (OUTPATIENT)
Dept: LAB | Facility: LAB | Age: 29
End: 2024-10-29
Payer: COMMERCIAL

## 2024-10-29 ENCOUNTER — APPOINTMENT (OUTPATIENT)
Dept: PRIMARY CARE | Facility: CLINIC | Age: 29
End: 2024-10-29
Payer: COMMERCIAL

## 2024-10-29 VITALS
WEIGHT: 311.7 LBS | HEIGHT: 72 IN | OXYGEN SATURATION: 95 % | TEMPERATURE: 97.9 F | SYSTOLIC BLOOD PRESSURE: 116 MMHG | HEART RATE: 89 BPM | BODY MASS INDEX: 42.22 KG/M2 | DIASTOLIC BLOOD PRESSURE: 76 MMHG

## 2024-10-29 DIAGNOSIS — M25.561 RIGHT KNEE PAIN, UNSPECIFIED CHRONICITY: ICD-10-CM

## 2024-10-29 DIAGNOSIS — I42.0 DILATED CARDIOMYOPATHY (MULTI): ICD-10-CM

## 2024-10-29 DIAGNOSIS — K75.81 NASH (NONALCOHOLIC STEATOHEPATITIS): ICD-10-CM

## 2024-10-29 DIAGNOSIS — I42.0 DILATED CARDIOMYOPATHY (MULTI): Chronic | ICD-10-CM

## 2024-10-29 DIAGNOSIS — E03.9 HYPOTHYROIDISM, UNSPECIFIED TYPE: ICD-10-CM

## 2024-10-29 DIAGNOSIS — I50.9 CONGESTIVE HEART FAILURE, UNSPECIFIED HF CHRONICITY, UNSPECIFIED HEART FAILURE TYPE: ICD-10-CM

## 2024-10-29 LAB
ALBUMIN SERPL BCP-MCNC: 4.8 G/DL (ref 3.4–5)
ALP SERPL-CCNC: 49 U/L (ref 33–120)
ALT SERPL W P-5'-P-CCNC: 25 U/L (ref 10–52)
ANION GAP SERPL CALC-SCNC: 10 MMOL/L (ref 10–20)
AST SERPL W P-5'-P-CCNC: 16 U/L (ref 9–39)
BASOPHILS # BLD AUTO: 0.03 X10*3/UL (ref 0–0.1)
BASOPHILS NFR BLD AUTO: 0.4 %
BILIRUB SERPL-MCNC: 0.5 MG/DL (ref 0–1.2)
BNP SERPL-MCNC: 15 PG/ML (ref 0–99)
BUN SERPL-MCNC: 13 MG/DL (ref 6–23)
CALCIUM SERPL-MCNC: 10.1 MG/DL (ref 8.6–10.6)
CHLORIDE SERPL-SCNC: 104 MMOL/L (ref 98–107)
CO2 SERPL-SCNC: 30 MMOL/L (ref 21–32)
CREAT SERPL-MCNC: 0.93 MG/DL (ref 0.5–1.3)
EGFRCR SERPLBLD CKD-EPI 2021: >90 ML/MIN/1.73M*2
EOSINOPHIL # BLD AUTO: 0.18 X10*3/UL (ref 0–0.7)
EOSINOPHIL NFR BLD AUTO: 2.5 %
ERYTHROCYTE [DISTWIDTH] IN BLOOD BY AUTOMATED COUNT: 12.1 % (ref 11.5–14.5)
GLUCOSE SERPL-MCNC: 91 MG/DL (ref 74–99)
HCT VFR BLD AUTO: 42.8 % (ref 41–52)
HGB BLD-MCNC: 14.5 G/DL (ref 13.5–17.5)
IMM GRANULOCYTES # BLD AUTO: 0.02 X10*3/UL (ref 0–0.7)
IMM GRANULOCYTES NFR BLD AUTO: 0.3 % (ref 0–0.9)
LYMPHOCYTES # BLD AUTO: 1.36 X10*3/UL (ref 1.2–4.8)
LYMPHOCYTES NFR BLD AUTO: 18.8 %
MCH RBC QN AUTO: 31 PG (ref 26–34)
MCHC RBC AUTO-ENTMCNC: 33.9 G/DL (ref 32–36)
MCV RBC AUTO: 92 FL (ref 80–100)
MONOCYTES # BLD AUTO: 0.7 X10*3/UL (ref 0.1–1)
MONOCYTES NFR BLD AUTO: 9.7 %
NEUTROPHILS # BLD AUTO: 4.93 X10*3/UL (ref 1.2–7.7)
NEUTROPHILS NFR BLD AUTO: 68.3 %
NRBC BLD-RTO: 0 /100 WBCS (ref 0–0)
PHOSPHATE SERPL-MCNC: 4.5 MG/DL (ref 2.5–4.9)
PLATELET # BLD AUTO: 263 X10*3/UL (ref 150–450)
POTASSIUM SERPL-SCNC: 4.3 MMOL/L (ref 3.5–5.3)
PROT SERPL-MCNC: 7.4 G/DL (ref 6.4–8.2)
RBC # BLD AUTO: 4.68 X10*6/UL (ref 4.5–5.9)
SODIUM SERPL-SCNC: 140 MMOL/L (ref 136–145)
TSH SERPL-ACNC: 2.89 MIU/L (ref 0.44–3.98)
WBC # BLD AUTO: 7.2 X10*3/UL (ref 4.4–11.3)

## 2024-10-29 PROCEDURE — 84100 ASSAY OF PHOSPHORUS: CPT

## 2024-10-29 PROCEDURE — 3008F BODY MASS INDEX DOCD: CPT | Performed by: FAMILY MEDICINE

## 2024-10-29 PROCEDURE — 1036F TOBACCO NON-USER: CPT | Performed by: FAMILY MEDICINE

## 2024-10-29 PROCEDURE — 83880 ASSAY OF NATRIURETIC PEPTIDE: CPT

## 2024-10-29 PROCEDURE — 99214 OFFICE O/P EST MOD 30 MIN: CPT | Performed by: FAMILY MEDICINE

## 2024-10-29 PROCEDURE — 84443 ASSAY THYROID STIM HORMONE: CPT

## 2024-10-29 PROCEDURE — 85025 COMPLETE CBC W/AUTO DIFF WBC: CPT

## 2024-10-29 PROCEDURE — 36415 COLL VENOUS BLD VENIPUNCTURE: CPT

## 2024-10-29 PROCEDURE — 80053 COMPREHEN METABOLIC PANEL: CPT

## 2024-10-29 PROCEDURE — 73562 X-RAY EXAM OF KNEE 3: CPT | Mod: RT

## 2024-10-29 ASSESSMENT — LIFESTYLE VARIABLES: HOW OFTEN DO YOU HAVE A DRINK CONTAINING ALCOHOL: 2-3 TIMES A WEEK

## 2024-10-30 ENCOUNTER — TELEMEDICINE (OUTPATIENT)
Dept: CARDIOLOGY | Facility: HOSPITAL | Age: 29
End: 2024-10-30
Payer: COMMERCIAL

## 2024-10-30 ENCOUNTER — CLINICAL SUPPORT (OUTPATIENT)
Dept: CARDIAC REHAB | Facility: CLINIC | Age: 29
End: 2024-10-30
Payer: COMMERCIAL

## 2024-10-30 VITALS — DIASTOLIC BLOOD PRESSURE: 91 MMHG | SYSTOLIC BLOOD PRESSURE: 127 MMHG

## 2024-10-30 DIAGNOSIS — I50.22 CHRONIC SYSTOLIC HEART FAILURE: ICD-10-CM

## 2024-10-30 DIAGNOSIS — I50.9 CONGESTIVE HEART FAILURE, UNSPECIFIED HF CHRONICITY, UNSPECIFIED HEART FAILURE TYPE: Primary | ICD-10-CM

## 2024-10-30 PROCEDURE — 99214 OFFICE O/P EST MOD 30 MIN: CPT | Performed by: STUDENT IN AN ORGANIZED HEALTH CARE EDUCATION/TRAINING PROGRAM

## 2024-10-30 PROCEDURE — 1036F TOBACCO NON-USER: CPT | Performed by: STUDENT IN AN ORGANIZED HEALTH CARE EDUCATION/TRAINING PROGRAM

## 2024-10-30 PROCEDURE — 93798 PHYS/QHP OP CAR RHAB W/ECG: CPT | Performed by: INTERNAL MEDICINE

## 2024-10-30 ASSESSMENT — ENCOUNTER SYMPTOMS
NEAR-SYNCOPE: 0
SYNCOPE: 0
MEMORY LOSS: 0
DECREASED APPETITE: 0
ORTHOPNEA: 0
WEIGHT LOSS: 0
HEMATEMESIS: 0
HEMATURIA: 0
BRUISES/BLEEDS EASILY: 0
PND: 0
ALTERED MENTAL STATUS: 0
COUGH: 0
DYSPNEA ON EXERTION: 1
PARESTHESIAS: 1
HEMATOCHEZIA: 0
SHORTNESS OF BREATH: 0
PALPITATIONS: 0
WEIGHT GAIN: 0

## 2024-11-01 ENCOUNTER — CLINICAL SUPPORT (OUTPATIENT)
Dept: CARDIAC REHAB | Facility: CLINIC | Age: 29
End: 2024-11-01
Payer: COMMERCIAL

## 2024-11-01 DIAGNOSIS — I50.9 CHRONIC CONGESTIVE HEART FAILURE, UNSPECIFIED HEART FAILURE TYPE: ICD-10-CM

## 2024-11-01 DIAGNOSIS — I50.22 CHRONIC SYSTOLIC HEART FAILURE: ICD-10-CM

## 2024-11-01 DIAGNOSIS — R73.03 PREDIABETES: ICD-10-CM

## 2024-11-01 PROCEDURE — 93798 PHYS/QHP OP CAR RHAB W/ECG: CPT | Performed by: INTERNAL MEDICINE

## 2024-11-01 RX ORDER — SEMAGLUTIDE 1.7 MG/.75ML
1.7 INJECTION, SOLUTION SUBCUTANEOUS
Qty: 3 ML | Refills: 5 | Status: SHIPPED | OUTPATIENT
Start: 2024-11-01

## 2024-11-02 PROCEDURE — RXMED WILLOW AMBULATORY MEDICATION CHARGE

## 2024-11-04 ENCOUNTER — CLINICAL SUPPORT (OUTPATIENT)
Dept: GASTROENTEROLOGY | Facility: CLINIC | Age: 29
End: 2024-11-04
Payer: COMMERCIAL

## 2024-11-04 ENCOUNTER — CLINICAL SUPPORT (OUTPATIENT)
Dept: CARDIAC REHAB | Facility: CLINIC | Age: 29
End: 2024-11-04
Payer: COMMERCIAL

## 2024-11-04 ENCOUNTER — PHARMACY VISIT (OUTPATIENT)
Dept: PHARMACY | Facility: CLINIC | Age: 29
End: 2024-11-04
Payer: MEDICARE

## 2024-11-04 ENCOUNTER — TELEPHONE (OUTPATIENT)
Dept: PRIMARY CARE | Facility: CLINIC | Age: 29
End: 2024-11-04

## 2024-11-04 DIAGNOSIS — K75.81 NASH (NONALCOHOLIC STEATOHEPATITIS): ICD-10-CM

## 2024-11-04 DIAGNOSIS — R73.03 PREDIABETES: Primary | ICD-10-CM

## 2024-11-04 DIAGNOSIS — I50.22 CHRONIC SYSTOLIC HEART FAILURE: ICD-10-CM

## 2024-11-04 PROCEDURE — 91200 LIVER ELASTOGRAPHY: CPT | Performed by: INTERNAL MEDICINE

## 2024-11-04 PROCEDURE — 93798 PHYS/QHP OP CAR RHAB W/ECG: CPT | Performed by: INTERNAL MEDICINE

## 2024-11-04 RX ORDER — SEMAGLUTIDE 2.4 MG/.75ML
2.4 INJECTION, SOLUTION SUBCUTANEOUS WEEKLY
Qty: 3 ML | Refills: 0 | Status: SHIPPED | OUTPATIENT
Start: 2024-11-04 | End: 2024-11-04

## 2024-11-04 RX ORDER — SEMAGLUTIDE 2.4 MG/.75ML
2.4 INJECTION, SOLUTION SUBCUTANEOUS WEEKLY
Qty: 3 ML | Refills: 3 | Status: SHIPPED | OUTPATIENT
Start: 2024-11-04 | End: 2025-02-18

## 2024-11-05 PROCEDURE — RXMED WILLOW AMBULATORY MEDICATION CHARGE

## 2024-11-05 NOTE — TELEPHONE ENCOUNTER
Pt reached out to office     Requesting next dose higher semiglutide (2.4 mg daily)       Sent in to minoff     Pls call pt and let him know

## 2024-11-06 ENCOUNTER — CLINICAL SUPPORT (OUTPATIENT)
Dept: CARDIAC REHAB | Facility: CLINIC | Age: 29
End: 2024-11-06
Payer: COMMERCIAL

## 2024-11-06 ENCOUNTER — PHARMACY VISIT (OUTPATIENT)
Dept: PHARMACY | Facility: CLINIC | Age: 29
End: 2024-11-06
Payer: MEDICARE

## 2024-11-06 DIAGNOSIS — I50.22 CHRONIC SYSTOLIC HEART FAILURE: ICD-10-CM

## 2024-11-06 PROCEDURE — 93798 PHYS/QHP OP CAR RHAB W/ECG: CPT | Performed by: INTERNAL MEDICINE

## 2024-11-06 NOTE — PROGRESS NOTES
Cirilo Wills  1995  [id number]  29 y.o.    [facility] [id number] CARDCHANTE      Cardiac/Pulmonary Rehab Nutrition Education    11/6/2024  Patient attended Heart Healthy Diet lecture with program RDN during today's exercise session. Heart Healthy diet tips sheet was given for further review at home and patient was encouraged to come back with any follow up questions.    Signature Lara Walters RDN, LD

## 2024-11-08 ENCOUNTER — CLINICAL SUPPORT (OUTPATIENT)
Dept: CARDIAC REHAB | Facility: CLINIC | Age: 29
End: 2024-11-08
Payer: COMMERCIAL

## 2024-11-08 DIAGNOSIS — I50.22 CHRONIC SYSTOLIC HEART FAILURE: ICD-10-CM

## 2024-11-08 PROCEDURE — 93798 PHYS/QHP OP CAR RHAB W/ECG: CPT | Performed by: INTERNAL MEDICINE

## 2024-11-11 ENCOUNTER — CLINICAL SUPPORT (OUTPATIENT)
Dept: CARDIAC REHAB | Facility: CLINIC | Age: 29
End: 2024-11-11
Payer: COMMERCIAL

## 2024-11-11 DIAGNOSIS — I50.22 CHRONIC SYSTOLIC HEART FAILURE: ICD-10-CM

## 2024-11-11 PROCEDURE — 93798 PHYS/QHP OP CAR RHAB W/ECG: CPT | Performed by: INTERNAL MEDICINE

## 2024-11-12 ENCOUNTER — DOCUMENTATION (OUTPATIENT)
Dept: CARDIAC REHAB | Facility: CLINIC | Age: 29
End: 2024-11-12
Payer: COMMERCIAL

## 2024-11-12 NOTE — PROGRESS NOTES
Cardiac Rehabilitation 30 Day Reassessment    Name: Cirilo Wills   Medical Record Number: [id number]  YOB: 1995   Age: 29 y.o.  Today’s Date: 11/12/2024   Primary Care Physician: Miriam Melchor DO   Referring Physician: Monique Blevins MD*   Program Location: [address]        General  Primary Diagnosis:   1. Dilated cardiomyopathy (Multi)  Referral to Cardiac Rehab      2. Congestive heart failure, unspecified HF chronicity, unspecified heart failure type  Referral to Cardiac Rehab         Onset/Date of Diagnosis: 06/08/24   Session #: 11  AACVPR Risk Stratification: Moderate   Falls Risk: Low    Psychosocial Initial Assessment:   PH-Q 9:   Pre: 6    Sent PH-Q 9 to MD if score > 20: No; score < 20    Pt reported/currently experiencing stress: Yes; Depression; Severity: mild  Patient uses stress management skills: Yes   History of: depression  Currently seeing a mental health provider: No  Social Support: Yes, Whom: Family  Quality of Life Survey: KCCQ (see Heart Failure Management below)  Learning Assessment:  Learning assessment/barriers: None  Preferred learning method: Auditory, Visual, Reading handout, and Writing handout  Barriers: None  Comments:    Stages of Change: Action    Psychosocial Plan  Goal Status: In progress  Psychosocial Goals: Demonstrating proper techniques for stress management, Maintain or lower PH-Q 9 score by discharge, and Identify strategies for managing depression    Psychosocial Interventions/Education:   Initial PH-Q 9 score reviewed  11/13/24 reports no new stress    Nutrition Initial Assessment:    Diet Habit Survey: Picture Your Plate  Pre: 66  Post: To be done at discharge.    Survey results reviewed with Dietician: Not at this time    Lipids Assessment  Current Dietary Guidelines: Low sodium  Barriers to dietary change: no  Hyperlipidemia: No   Lab Results   Component Value Date    CHOL 114 07/16/2024    HDL 35.9 07/16/2024    LDLCALC 66  "07/16/2024    TRIG 61 07/16/2024     Diabetes Assessment  History of Diabetes: No  Lab Results   Component Value Date    HGBA1C 5.3 06/08/2024      Weight Management  Weight: 140 kg (309 lb 9.6 oz)  Height: 182.9 cm (6' 0.01\")  BMI (Calculated): 41.98       Nutrition Plan  Goal Status: In progress  Nutrition Goals: Improve Diet Habit Survey score by 5-10 points by discharge, Adapt a low-sodium, DASH diet prior to discharge, Adapt a Mediterranean focused diet prior to discharge, Learn how to read and interpret nutrition labels prior to discharge, and Lose 1lb/week while enrolled in program    Nutrition Interventions/Education:   11/6/2024  Patient attended Heart Healthy Diet lecture with program RDN during today's exercise session. Heart Healthy diet tips sheet was given for further review at home and patient was encouraged to come back with any follow up questions.  Signature Lara Walters RDN, KATINA    Exercise Reassessment:   Current Home Exercise: No    Exercise Prescription  Exercise Prescription based on:   [facility] Activity Status Index (DASI)  DASI Score: 34.7   MET Score: 7       Frequency:  3 days/week  Mode: Treadmill, NuStep, and Recumbent Cycle  Duration: 45 total aerobic minutes  Intensity: RPE 12-16  Target HR:   108-118  MET Level: 4.6  Patient wears supplemental O2: No   Modality Workload METs Duration (minutes)   1 Pre-Exercise      2 Session Warm Up   5 : 00   3 Treadmill 3.5 mph @ 1 % 4.2 15 : 00   4 NuStep 4000 10 load @ 120 jacobs 3.3 15 : 00   5 Recumbent Bike 8 load @ 75 rpms 4.6 15 : 00   6 Final [place]                 5:00   6 Post-Exercise                  2:00      Resistance Training: No   Home Exercise Prescription given: To be given prior to discharge from program.    Exercise Plan  Goal Status: In progress  Exercise Goals: Increase exercise MET level by 5-10% each week, Increase total exercise duration to 30-45 minutes, Obtain 150 minutes/week of moderate intensity aerobic exercise, " Initiate strength training 2-3 days a week, Initiate flexibility training 2-3 days a week, and Establish a home exercise program before discharge    Exercise Interventions/Education:   10/30/2024   Stretching education was done with patient during today's rehab session. Demonstrations of standing stretches were done with patient, in addition to discussing the benefits of flexibility training. Patient was provided handout with examples of exercises and further instructions to be practiced at home. Resistance training guidelines were also discussed. Patient encouraged to report to rehab staff with any questions or concerns.  11/4/24 adjusted THR.    11/13/2024  Cardiovascular changes with exercise were discussed in today's session. Both immediate and long term effects of exercise were covered and the importance of cooling down post workout was reviewed.      Other Core Components/Risk Factor Initial Assessment:  Medication adherence  Medication compliance: Yes  Uses pill box/organizer: Yes   Carries medication list: Yes   Current Medications:   Medication Documentation Review Audit       Reviewed by Deya French RN (Registered Nurse) on 10/30/24 at 1040      Medication Order Taking? Sig Documenting Provider Last Dose Status   atorvastatin (Lipitor) 40 mg tablet [id number] Yes Take 1 tablet (40 mg) by mouth once daily. Blessing Fisher MD Taking Active   carvedilol (Coreg) 6.25 mg tablet [id number] Yes Take 1 tablet (6.25 mg) by mouth 2 times daily (morning and late afternoon). Monique Blevins MD PhD Taking Active   dapagliflozin propanediol (Farxiga) 10 mg [id number] Yes Take 1 tablet (10 mg) by mouth once daily. Monique Blevins MD PhD Taking Active   pantoprazole (ProtoNix) 20 mg EC tablet [id number] Yes Take 1 tablet (20 mg) by mouth once daily in the morning. Take before meals. Do not crush, chew, or split. Monique Blevins MD PhD Taking Active   resmetirom (Rezdiffra) 100 mg tablet [id number] Yes Take  100 mg by mouth once daily. Cheyanne Cali, APRN-CNP Taking Active   sacubitriL-valsartan (Entresto) 49-51 mg tablet [id number] Yes Take 1 tablet by mouth 2 times a day. Monique Blevins MD PhD  Active   semaglutide, weight loss, (Wegovy) 1.7 mg/0.75 mL pen injector [id number] Yes Inject 1.7 mg under the skin every 7 days. Blessing Fisher MD  Active   spironolactone (Aldactone) 25 mg tablet [id number] Yes Take 1 tablet (25 mg) by mouth once daily. Monique Blevins MD PhD Taking Active   Vumerity 231 mg capsule,delayed release(DR/EC) [id number] Yes TAKE 2 CAPSULES BY MOUTH 2 TIMES A DAY. Dunia Whitmore, APRN-CNP Taking Active                   Is patient prescribed Nitroglycerin? No  Blood Pressure Management  History of Hypertension: Yes   Medication Changes: No   Resting BP:  pre-ex. 110/70 and post-ex. 94/64      Heart Failure Management  Hx of Heart Failure: Yes, Type (selection): HFrEF  Most recent EF: 25-30%     Onset of heart failure diagnosis: 6/8/24  Last heart failure hospitalization: None  Number of HF admissions per year: 0    Symptoms: Fatigue with exertion and Shortness of breath  Is there a family Hx of HF: No   Does patient obtain daily weight No     KCCQ survey: Pre: 69.79  Post: TBD    Heart Failure Reassessment Heart Failure Symptom Management: Reassessed every 30 days while in program.   Unplanned MD and/or ED Heart Failure visit: No  Hospitalization since starting program/last assessment: No  MD contacted regarding Heart Failure symptoms: No     Heart Failure Goals: Able to verbalize signs and symptoms of fluid retention and when to contact MD, Adapt a low sodium diet and verbalize guidelines, Obtain daily weight and verbalize proper method of obtaining weights    Smoking/Tobacco Assessment  Social History     Tobacco Use    Smoking status: Former     Current packs/day: 0.00     Types: Cigarettes    Smokeless tobacco: Never    Tobacco comments:     Social use in college   Vaping Use     Vaping status: Some Days    Substances: THC    Devices: Disposable   Substance Use Topics    Alcohol use: Yes     Alcohol/week: 6.0 standard drinks of alcohol     Types: 6 Standard drinks or equivalent per week    Drug use: Yes     Frequency: 3.0 times per week     Types: Marijuana      Other Core Component Plan  Goal Status: In progress  Other Core Component Goals: Verbalize medication usage and drug actions by discharge, Begin tobacco cessation program, Set tobacco cessation quit date while enrolled, and Achieve resting BP of < 130/80 by discharge    Other Core Component Interventions/Education:   10/23/2024  Risk factors of cardiovascular disease were discussed today's. Education on modifiable and non-modifiable risk factors of CVD and recommendations to manage them was done today. Handouts were provided along with further reference. Patient was educated to comeback with further questions.  11/12/24 Pt had already received Heart Failure Education Booklet on 7/1/2024 from his doctor per review of pt record.       Individual Patient Goals:  Climb 2 flight of stairs without getting tired.   Lose weight by 5 lb.   Goal Status: In progress    Staff Comments:  Mr. Wills has started his Cardiac Rehab and has attended 11 sessions.  He is making progress towards his goals and outcomes.  He is progressing on all modalities without any issues other than that his heart rate does get above THR.  He has no complaint of chest pain and no ectopy was noted on the monitor other than tachycardia.     Rehab Staff Signature: Troy Bell RN, EP

## 2024-11-13 ENCOUNTER — CLINICAL SUPPORT (OUTPATIENT)
Dept: CARDIAC REHAB | Facility: CLINIC | Age: 29
End: 2024-11-13
Payer: COMMERCIAL

## 2024-11-13 DIAGNOSIS — I50.22 CHRONIC SYSTOLIC HEART FAILURE: ICD-10-CM

## 2024-11-13 PROCEDURE — 93798 PHYS/QHP OP CAR RHAB W/ECG: CPT | Performed by: INTERNAL MEDICINE

## 2024-11-15 ENCOUNTER — CLINICAL SUPPORT (OUTPATIENT)
Dept: CARDIAC REHAB | Facility: CLINIC | Age: 29
End: 2024-11-15
Payer: COMMERCIAL

## 2024-11-15 DIAGNOSIS — I50.22 CHRONIC SYSTOLIC HEART FAILURE: ICD-10-CM

## 2024-11-15 PROCEDURE — 93798 PHYS/QHP OP CAR RHAB W/ECG: CPT | Performed by: INTERNAL MEDICINE

## 2024-11-18 ENCOUNTER — CLINICAL SUPPORT (OUTPATIENT)
Dept: CARDIAC REHAB | Facility: CLINIC | Age: 29
End: 2024-11-18
Payer: COMMERCIAL

## 2024-11-18 DIAGNOSIS — I50.22 CHRONIC SYSTOLIC HEART FAILURE: ICD-10-CM

## 2024-11-18 PROCEDURE — 93798 PHYS/QHP OP CAR RHAB W/ECG: CPT | Performed by: INTERNAL MEDICINE

## 2024-11-19 ENCOUNTER — OFFICE VISIT (OUTPATIENT)
Dept: SLEEP MEDICINE | Facility: CLINIC | Age: 29
End: 2024-11-19
Payer: COMMERCIAL

## 2024-11-19 VITALS
BODY MASS INDEX: 41.42 KG/M2 | OXYGEN SATURATION: 94 % | SYSTOLIC BLOOD PRESSURE: 114 MMHG | WEIGHT: 305.8 LBS | DIASTOLIC BLOOD PRESSURE: 73 MMHG | HEIGHT: 72 IN | HEART RATE: 84 BPM

## 2024-11-19 DIAGNOSIS — G47.33 OSA (OBSTRUCTIVE SLEEP APNEA): ICD-10-CM

## 2024-11-19 PROCEDURE — 1036F TOBACCO NON-USER: CPT | Performed by: NURSE PRACTITIONER

## 2024-11-19 PROCEDURE — 3008F BODY MASS INDEX DOCD: CPT | Performed by: NURSE PRACTITIONER

## 2024-11-19 PROCEDURE — 99214 OFFICE O/P EST MOD 30 MIN: CPT | Performed by: NURSE PRACTITIONER

## 2024-11-19 ASSESSMENT — ENCOUNTER SYMPTOMS
OCCASIONAL FEELINGS OF UNSTEADINESS: 0
LOSS OF SENSATION IN FEET: 0
DEPRESSION: 1

## 2024-11-19 ASSESSMENT — SLEEP AND FATIGUE QUESTIONNAIRES
ESS-CHAD TOTAL SCORE: 6
DIFFICULTY_STAYING_ASLEEP: MILD
WORRIED_DISTRESSED_DUE_TO_SLEEP: NOT AT ALL NOTICEABLE
SLEEP_PROBLEM_INTERFERES_DAILY_ACTIVITIES: A LITTLE
HOW LIKELY ARE YOU TO NOD OFF OR FALL ASLEEP WHILE SITTING QUIETLY AFTER LUNCH WITHOUT ALCOHOL: WOULD NEVER DOZE
HOW LIKELY ARE YOU TO NOD OFF OR FALL ASLEEP WHILE WATCHING TV: MODERATE CHANCE OF DOZING
HOW LIKELY ARE YOU TO NOD OFF OR FALL ASLEEP WHILE LYING DOWN TO REST IN THE AFTERNOON WHEN CIRCUMSTANCES PERMIT: MODERATE CHANCE OF DOZING
HOW LIKELY ARE YOU TO NOD OFF OR FALL ASLEEP WHILE SITTING AND TALKING TO SOMEONE: WOULD NEVER DOZE
HOW LIKELY ARE YOU TO NOD OFF OR FALL ASLEEP IN A CAR, WHILE STOPPED FOR A FEW MINUTES IN TRAFFIC: WOULD NEVER DOZE
SATISFACTION_WITH_CURRENT_SLEEP_PATTERN: VERY SATISFIED
SLEEP_PROBLEM_NOTICEABLE_TO_OTHERS: A LITTLE
HOW LIKELY ARE YOU TO NOD OFF OR FALL ASLEEP WHEN YOU ARE A PASSENGER IN A CAR FOR AN HOUR WITHOUT A BREAK: WOULD NEVER DOZE
SITING INACTIVE IN A PUBLIC PLACE LIKE A CLASS ROOM OR A MOVIE THEATER: SLIGHT CHANCE OF DOZING
HOW LIKELY ARE YOU TO NOD OFF OR FALL ASLEEP WHILE SITTING AND READING: SLIGHT CHANCE OF DOZING

## 2024-11-19 ASSESSMENT — PAIN SCALES - GENERAL: PAINLEVEL_OUTOF10: 0-NO PAIN

## 2024-11-19 NOTE — ASSESSMENT & PLAN NOTE
Extremely severe KG with RDI 3% of 111.8, RDI 4% of 109.0, SpO2 monica of 75%. PAP titration was completed from 4 to 12 cwp. Recommendation was for auto PAP 6-16 for fixed CPAP with nasal interface. He was setup with PAP per [facility] in sept.   Recent CHF diagnosed with EF 25-35%.   -Well controlled on current settings  -continue current PAP settings  -Supply order updated today  -Discussed recommended cleaning, supply replenishment guidelines. He verbalized understanding   -RTC 6 mo or sooner if needed

## 2024-11-19 NOTE — PROGRESS NOTES
Patient: Cirilo Wills    [id number]  : 1995 -- AGE 29 y.o.    Provider: LISSETTE Perdomo     Location [facility]   Service Date: 2024              [facility]  New Visit Note      HISTORY OF PRESENT ILLNESS     The patient's referring provider is: No ref. provider found    HISTORY OF PRESENT ILLNESS   Criilo Wills is a 29 y.o. male who presents to a [facility] for a sleep medicine evaluation with concerns of KG. [facility] .     The patient has h/o CHF EF 25-30% on echo in 2024; hyperlipidemia, obesity, prediabetes, SEBASTIAN, MS, KG.    Past Sleep History  Patient has had a sleep study in the past in 2019 in [place]. Was diagnosed with severe sleep apnea (reports AHI ~ 60) and set up with CPAP.   States he used for a month or less and returned the equipment. Was forced to sleep on his back due to the mask, felt worse when he would wake in the mornings.     Severe sleep apnea with RDI 3% of 111.8, RDI 4% of 109.0, SpO2 monica of 75%. PAP titration was completed from 4 to 12 cwp. Recommendation was for auto PAP 6-16 for fixed CPAP with nasal interface. He was setup with PAP per [facility] in sept.     Current History    Notes he is sleeping ok with PAP. Denies feeling much differently since starting. Asking about cleaning schedule, supply replenishment. Has repeat echo in Feb. Started cardiac rehab.     Sleep schedule  on weekdays / work days:  Usual Bedtime:    12 am  Falls asleep around:  30 min  # of awakenings:   Wake time:  6 AM    Naps: afternoon none    Sleep schedule on weekends/non work days :  Usual Bedtime:    12:30  Falls asleep around:  15 min  # of awakenings:   Wake time:  8 am    Naps: none    Preferred sleeping position: side, stomach     Sleep-related ROS:    Problems going to sleep: No problems going to sleep    Problems staying asleep : not  typically    Breathing during sleep: snoring, witnessed apneas, and nocturnal reflux symptoms    Daytime Symptoms  On awakening patient reports: morning dry mouth and morning sore throat    RLS screen:  RLSSCREEN: - Sensations: Patient does not have unusual sensations in their extremities that cause an urge to move them     Sleep-related behaviors:   DENIES    Fatigue: denies    ESS: 6   CHANTEL: 3  FOSQ:  39      REVIEW OF SYSTEMS     REVIEW OF SYSTEMS  Review of Systems   All other systems reviewed and are negative.      ALLERGIES AND MEDICATIONS     ALLERGIES  No Known Allergies    MEDICATIONS  Current Outpatient Medications   Medication Sig Dispense Refill    atorvastatin (Lipitor) 40 mg tablet Take 1 tablet (40 mg) by mouth once daily. 90 tablet 3    carvedilol (Coreg) 6.25 mg tablet Take 1 tablet (6.25 mg) by mouth 2 times daily (morning and late afternoon). 60 tablet 11    dapagliflozin propanediol (Farxiga) 10 mg Take 1 tablet (10 mg) by mouth once daily. 30 tablet 11    pantoprazole (ProtoNix) 20 mg EC tablet Take 1 tablet (20 mg) by mouth once daily in the morning. Take before meals. Do not crush, chew, or split. 30 tablet 11    resmetirom (Rezdiffra) 100 mg tablet Take 100 mg by mouth once daily. 30 tablet 11    sacubitriL-valsartan 97 mg-103 mg (Entresto)  mg tablet Take 1 tablet by mouth 2 times a day. 60 tablet 11    semaglutide, weight loss, (Wegovy) 2.4 mg/0.75 mL pen injector Inject 2.4 mg under the skin 1 (one) time per week for 16 doses. 3 mL 3    spironolactone (Aldactone) 25 mg tablet Take 1 tablet (25 mg) by mouth once daily. 30 tablet 11    Vumerity 231 mg capsule,delayed release(DR/EC) TAKE 2 CAPSULES BY MOUTH 2 TIMES A DAY. 120 capsule 5     No current facility-administered medications for this visit.         PAST HISTORY     PAST MEDICAL HISTORY  Past Medical History:   Diagnosis Date    Dilated cardiomyopathy (Multi) 04/11/2024    EF 25 to 30%    Multiple sclerosis (Multi) 11/10/2022     Multiple sclerosis    Obstructive sleep apnea 03/20/2019    Thyrotoxicosis, unspecified without thyrotoxic crisis or storm 12/09/2014    Hyperthyroidism       PAST SURGICAL HISTORY:  Past Surgical History:   Procedure Laterality Date    MR HEAD ANGIO WO IV CONTRAST  3/21/2013    MR HEAD ANGIO WO IV CONTRAST 3/21/2013 [facility]       FAMILY HISTORY  Family History   Problem Relation Name Age of Onset    Heart attack Maternal Grandfather Christopher 50    Diabetes Maternal Grandfather Christopher        DOES/DOES NOT EC: does have a family history of sleep disorder. Father KG and brother suspected      SOCIAL HISTORY  He  reports that he has quit smoking. His smoking use included cigarettes. He has never used smokeless tobacco. He reports current alcohol use of about 6.0 standard drinks of alcohol per week. He reports current drug use. Frequency: 3.00 times per week. Drug: Marijuana. He currently lives alone.     Caffeine consumption: 1x day  Alcohol consumption: 3x week  Smoking: none  Marijuana: yes; vape     PHYSICAL EXAM     VITAL SIGNS: /73 (BP Location: Left arm, Patient Position: Sitting, BP Cuff Size: Large adult)   Pulse 84   Ht 1.829 m (6')   Wt 139 kg (305 lb 12.8 oz)   SpO2 94%   BMI 41.47 kg/m²      PREVIOUS WEIGHTS:  Wt Readings from Last 3 Encounters:   11/19/24 139 kg (305 lb 12.8 oz)   10/29/24 141 kg (311 lb 11.2 oz)   10/14/24 140 kg (309 lb 9.6 oz)       Physical Exam  Physical Exam   Constitutional: Alert and oriented, cooperative, no obvious distress   HEENT: Non icteric or anemic, EOM WNL bilaterally   Neck: Supple, no JVD, no goiter, no adenopathy, no rigidity  Chest: CTA bilaterally, no wheezing, crackles, rubs   Cardiac: RRR, S1 and S2, no murmur, rub, thrill   Abdomen: Obese, Soft, nontender, no masses, no organomegaly   Extremities: No clubbing, no LL edema   Neuromuscular: Cranial nerves grossly intact, no focal deficits      RESULTS/DATA     Bicarbonate (mmol/L)   Date Value    10/29/2024 30   08/24/2024 27   07/16/2024 28     Iron (ug/dL)   Date Value   07/16/2024 96   04/11/2024 93     % Saturation (%)   Date Value   07/16/2024 27   04/11/2024 25     TIBC (ug/dL)   Date Value   07/16/2024 353   04/11/2024 377     Ferritin (ng/mL)   Date Value   07/16/2024 750 (H)   04/11/2024 588 (H)     Echo 3/27/24:      PAP Adherence:    09/25/2024    ASSESSMENT/PLAN     Mr. Wills is a 29 y.o. male and He was referred to the [facility] for evaluation of KG    Problem List and Orders  Problem List Items Addressed This Visit             ICD-10-CM    KG (obstructive sleep apnea) G47.33     Extremely severe KG with RDI 3% of 111.8, RDI 4% of 109.0, SpO2 monica of 75%. PAP titration was completed from 4 to 12 cwp. Recommendation was for auto PAP 6-16 for fixed CPAP with nasal interface. He was setup with PAP per [facility] in sept.   Recent CHF diagnosed with EF 25-35%.   -Well controlled on current settings  -continue current PAP settings  -Supply order updated today  -Discussed recommended cleaning, supply replenishment guidelines. He verbalized understanding   -RTC 6 mo or sooner if needed            BMI 40.0-44.9, adult (Multi) - Primary Z68.41     Weight loss recommended  Follow up with PCP for recommendations                 Follow up in 6 mo

## 2024-11-19 NOTE — PATIENT INSTRUCTIONS
[facility] Sleep Medicine  [facility] [address]  [facility]  [address]  [place] [address]       NAME: Cirilo Wills   DATE:  11/19/24    DIAGNOSIS:   No diagnosis found.    Thank you for coming to the Sleep Medicine Clinic today! Your sleep medicine provider today was: LISSETTE Perdomo Below is a summary of your treatment plan, other important information, and our contact numbers:    TREATMENT PLAN:   - Follow-up in 6 months.  - If not already done, sign up for 'My Chart' and send prescription requests or messages through this    Annual Reminders About Your Sleep Apnea    Your sleep apnea is well controlled based on reviewing your PAP Data Report.     General Reminders:  Continue current machine settings. Continue using machine every night. Need at least 4 hours daily usage.   Remember to clean your mask, tubings, and water chamber regularly as instructed.  Know the replacement schedule of your supplies/ accessories and contact your DME (durable medical equipment) provider if you are due for them.  Avoid driving or operating heavy machinery when drowsy. A person driving while sleepy is 5 times more likely to have an accident. If you feel sleepy, pull over and take a short power nap (sleep for less than 30 minutes). Otherwise, ask somebody to drive you.    Follow-up sooner through [place] or calling our office if you have any of the following symptoms:  Snoring or stopping breathing while using the machine  Recurrence of fatigue, sleepiness, insomnia, and other symptoms you had prior using machine  Persistent or worsening fatigue or sleepiness despite regular use of machine  Issues tolerating the machine like bloating sensation, air hunger, too much hot air, too much pressure, taking off mask without recall in the middle of sleep, etc.     Other conservative measures to improve sleep apnea:  Losing weight can lead to some improvement of KG which means you  will need lower pressures in machine to control your KG. In some patients, they don't need to use the machine after bariatric surgery. Hence, consider medical and/or surgical weight loss especially if your BMI is more than 35.  Avoiding alcohol, sedative-hypnotics, and sedating medications is imperative as these substances can worsen snoring and sleep apnea  If you have nasal congestion or seasonal allergies, improving your breathing through the nose is critical for treating sleep apnea, tolerating CPAP, and improving your sleep; hence, using intranasal steroid spray like Flonase might help. Make sure you know the proper way to use it.  Stay off your back when sleeping.    Common issues with PAP machine:  Mask gets dislodged when turning to the side: Consider getting a CPAP pillow or switching to a mask with hose on top.     Dry mouth:  Your machine has built-in humidifier that heats up the air to prevent dry mouth. It can be adjusted to your comfort. You can try that first and increase setting one level one night at a time to check which setting is comfortable and effective in lessening dry mouth. If dry mouth persists despite humidity setting adjustment, may apply OTC Biotene gel over the gums at bedtime.  If Biotene gel is not effective, consider trying XEROSTOM gel from [employer].  Also, eliminate or reduce dose of meds that can cause dry mouth if possible. Lastly, may try getting a separate room humidifier machine.    Airleaks: Please call DME as they may need to adjust your mask or refit you with a different kind of mask. In addition, you can ask DME for tips on getting a good mask seal and mask fit.     Difficulty tolerating the mask: Contact your DME to try a different kind of mask and/or call office to get a referral to Sleep Psychologist for CPAP desensitization. CPAP desensitization technique is a set of strategies that helps patient cope with claustrophobia and anxiety related to wearing mask.  "Alternatively, we can do a daytime mini-sleep study called PAP-nap trial wherein you will try on different kinds of mask and the sleep technician will try different pressure settings on CPAP and BPAP machines to see which specific pressure is tolerable and comfortable for you.     Water droplets or moisture within the hose and/or mask: This is called rain-out and it is caused by condensation of too much heated humidity on the cooler walls of the hose. If you have rain-out, turn down humidity settings or get a heated hose. If you already have a heated hose, turn up the \"tube temperature\" of the heated hose. Alternatively, if you don't want to get a heated hose or warmer air, may wrap the CPAP hose with stockings to keep it somewhat warm. Also, you need to place the machine on the floor and lower the hose so that water won't travel upward towards your mask.     You can also go to the following EDUCATION WEBSITES for further information:   American Academy of Sleep Medicine http://sleepeducation.org  National Sleep Foundation: https://sleepfoundation.org  American Sleep Apnea Association: https://www.sleepapnea.org (for patients with sleep apnea)    Here at [facility], we wish you a restful sleep!     Instructions - Common KG Recs: - For your sleep apnea, continue to use your PAP every night and use it whenever you are sleeping.   - Avoid alcohol or sedatives several hours prior to sleeping.   - Get additional supplies for your PAP (e.g., mask, hose, filters) every 3 months or as your insurance allows from your "[employer] company. Replacement cushions for your PAP mask can be requested monthly if airseals are an issue.  - Remember to clean your mask, tubings, and water chamber regularly as instructed.  - Avoid driving or operating heavy machinery when drowsy. A person driving while sleepy is five (5) times more likely to have an accident. If you feel sleepy, pull over and take a short power nap (sleep for less than " 30 minutes). Otherwise, ask somebody to drive you.    EASY WAYS TO IMPROVE YOUR SLEEP:  1. Go to bed and wake up at the same time every day.   Aim for 8 hours but some people need less, some need more.   Get out of bed if you are not sleeping.   Limit naps to 20 min or less.   2. Expose yourself to daylight and/ or bright light in the morning.   Go outside or spend time near a window each morning.   You can use a light box (found on Amazon) if you wake before the sunrise.   Limit light exposure in the evenings (including electronic usage).   Try meditation, reading, stretching, deep breathing, warm shower or bath, or yoga nidra as part of your bedtime routine. There are many great FREE, videos or audio tracks on [employer]/ [employer], etc for guidance.  3. Exercise, in some form, EVERY day, but not too close to bedtime. Consider making this part of your routine at the start of your day, followed by a cool shower.  4. Eat meals at roughly the same time every day. Make sure you are prioritizing fruits, vegetables, whole grains, lean proteins.  5. Time your caffeine intake. Make sure you are not drinking caffeine within 8 to 12 hours prior to your bedtime.   6. Avoid marijuana, alcohol, and nicotine. They will reduce sleep quality in any quantity.  7. Learn to manage anxiety. Psychology services at  can be reached at [phone] to schedule an appointment.     IMPORTANT INFORMATION:     Call 911 for medical emergencies.  Our offices are generally open from Monday-Friday, 9 am - 5 pm.  If you need to get in touch with me, you may either call me and my team(number is below) or you can use [facility].  If a referral for a test, for CPAP, or for another specialist was made, and you have not heard about scheduling this within a week, please call scheduling at [phone] ([phone]).  If you are unable to make your appointment for clinic or an overnight study, kindly call the office at least 48 hours in advance to cancel and  reschedule.  If you are on CPAP, please bring your device's card to each clinic appointment unless told otherwise by your provider.  There are no supporting services by either the sleep doctors or their staff on weekends and Holidays, or after 5 PM on weekdays.   If you have been asked to come to a sleep study, make sure you bring toiletries, a comfy pillow, and any nighttime medications that you may regularly take. Also be sure to eat dinner before you arrive. We generally do not provide meals.      PRESCRIPTIONS:  We require 7 days advanced notice for prescription refills. If we do not receive the request in this time, we cannot guarantee that your medication will be refilled in time. Please contact the sleep nurses listed below for refills or request via [facility].     IMPORTANT PHONE NUMBERS:   Sleep Medicine Clinic Fax: [phone]  Appointments (for Pediatric Sleep Clinic): [phone] - option 1  Appointments (for Adult Sleep Clinic): [phone] - option 2  Appointments (For Sleep Studies): [phone] - option 3  Behavioral Sleep Medicine: [phone]  Sleep Surgery: [phone]  ENT (Otolaryngology): [phone]  Headache Clinic (Neurology): [phone]  Neurology: [phone]  Psychiatry: [phone]  Pulmonary Function Testing (PFT) Center: [phone]  Pulmonary Medicine: [phone]  [employer] (DME): [phone]  [employer] (DME): [phone]  [facility] (DME): [phone]    Our Adult Sleep Medicine Team (Please do not hesitate to call the office or sleep nurse with any questions between appointments):    Adult Sleep Nurses (Ama Song, SHA and Yumiko Clark RN):  For clinical questions and refilling prescriptions: [phone]  Email sleep diaries and other documents at: [email or web address]    Adult Sleep Medicine Secretaries:  Modesta Ashley (For Latanya/Noble/Mook/Yvonne/Joon): P: [phone]  F:  [phone]  Carmelo Hoang (Te)Office: [phone] option 4 Office Fax: [phone]  Salina Black (For [place]): P: [phone]  Fax: [phone]  Pari Khalil (For Jurcevic/Blank): P: [phone]  F: [phone]  Flory Jhony (For [place]): P: [phone]  F: [phone]  eBssy Marquez (For Milagros/Bassam/Zakhary): P: [phone]  F: [phone]  Pam Calvo (For [place]): P: [phone]  F: [phone]     Adult Sleep Medicine Advanced Practice Providers:  Jimbo Mello ([place], [place])  Trang Banegas ([facility])  Maria Teresa Tiwari CNP ([place], [place], [place])  Ghazal Delvalle CNP ([place], [place], [place])  Germaine Mcgowan ([place], [place], [place])  Dante Miller CNP ([facility])      Our Sleep Testing Center (STC) Locations:  Our team will contact you to schedule your sleep study, however, you can contact us as follow:  Main Phone Line (scheduling only): [phone]), option 3  Adult and Pediatric Locations  [facility] (6 years and older): [facility] - 4th floor ([address] [place] [address]) After hours line: [phone]  [facility] (Main campus: All ages): [facility], 6th floor. After hours line: [phone]   [place] (5 years and older; younger considered on case-by-case basis): [address]; [address], [address]. Scheduling  After hours line: [phone]   [place] (6 years and older): [address]; [address]; [address]   [place] (6 years and older): [address] [facility], [address]  After hours line: [phone]   [place] (13 years and older) in [place]: [address], 2nd floor  After hours line: [phone]   [place] (13 year and older): [address]  After hours line: [phone] (Home studies out of [facility])    Adult Only Locations:   [place] (18 years and older): [address] [place] [address]  "Drive, 2nd floor   [place] (18 years and older): [address] [facility]; 4th floor  After hours line: [phone]  [facility] (18 years and older) at [place]: [address] [facility] [address] [facility]  After hours line: [phone]        CONTACTING YOUR SLEEP MEDICINE PROVIDER:  Send a message directly to your provider through \"My Chart\", which is the email service through your  Records Account: [email or web address]   Call [phone] and leave a message. One of the administrative assistants will forward the message to your sleep medicine provider through \"My Chart\" and/or email.     Your sleep medicine provider for this visit was: YEYO Perdomo-CNP    In the event that you are running more than 15 minutes late to your appointment, I will kindly ask you to reschedule.       "

## 2024-11-20 ENCOUNTER — CLINICAL SUPPORT (OUTPATIENT)
Dept: CARDIAC REHAB | Facility: CLINIC | Age: 29
End: 2024-11-20
Payer: COMMERCIAL

## 2024-11-20 DIAGNOSIS — I50.22 CHRONIC SYSTOLIC HEART FAILURE: ICD-10-CM

## 2024-11-20 PROCEDURE — 93798 PHYS/QHP OP CAR RHAB W/ECG: CPT | Performed by: INTERNAL MEDICINE

## 2024-11-20 NOTE — PROGRESS NOTES
Cardiac Rehab Education  Cirilo Wills   [id number]    11/20/2024  Education on sodium intake and consequences of high blood pressure was done during today's session. Discussed with patient the risks of excess levels of sodium and how to decrease dietary intake with provided list of substitutions. Handouts were given for home reference.      Signature Troy Bell RN

## 2024-11-22 ENCOUNTER — CLINICAL SUPPORT (OUTPATIENT)
Dept: CARDIAC REHAB | Facility: CLINIC | Age: 29
End: 2024-11-22
Payer: COMMERCIAL

## 2024-11-22 DIAGNOSIS — I50.22 CHRONIC SYSTOLIC HEART FAILURE: ICD-10-CM

## 2024-11-22 PROCEDURE — 93798 PHYS/QHP OP CAR RHAB W/ECG: CPT | Performed by: INTERNAL MEDICINE

## 2024-11-25 ENCOUNTER — CLINICAL SUPPORT (OUTPATIENT)
Dept: CARDIAC REHAB | Facility: CLINIC | Age: 29
End: 2024-11-25
Payer: COMMERCIAL

## 2024-11-25 DIAGNOSIS — I50.22 CHRONIC SYSTOLIC HEART FAILURE: ICD-10-CM

## 2024-11-25 PROCEDURE — 93798 PHYS/QHP OP CAR RHAB W/ECG: CPT | Performed by: INTERNAL MEDICINE

## 2024-11-27 ENCOUNTER — APPOINTMENT (OUTPATIENT)
Dept: PHYSICAL THERAPY | Facility: CLINIC | Age: 29
End: 2024-11-27
Payer: COMMERCIAL

## 2024-11-27 ENCOUNTER — APPOINTMENT (OUTPATIENT)
Dept: CARDIAC REHAB | Facility: CLINIC | Age: 29
End: 2024-11-27
Payer: COMMERCIAL

## 2024-11-29 ENCOUNTER — APPOINTMENT (OUTPATIENT)
Dept: CARDIAC REHAB | Facility: CLINIC | Age: 29
End: 2024-11-29
Payer: COMMERCIAL

## 2024-12-02 ENCOUNTER — CLINICAL SUPPORT (OUTPATIENT)
Dept: CARDIAC REHAB | Facility: CLINIC | Age: 29
End: 2024-12-02
Payer: COMMERCIAL

## 2024-12-02 DIAGNOSIS — I50.22 CHRONIC SYSTOLIC HEART FAILURE: ICD-10-CM

## 2024-12-02 PROCEDURE — 93798 PHYS/QHP OP CAR RHAB W/ECG: CPT | Performed by: INTERNAL MEDICINE

## 2024-12-02 PROCEDURE — RXMED WILLOW AMBULATORY MEDICATION CHARGE

## 2024-12-04 ENCOUNTER — CLINICAL SUPPORT (OUTPATIENT)
Dept: CARDIAC REHAB | Facility: CLINIC | Age: 29
End: 2024-12-04
Payer: COMMERCIAL

## 2024-12-04 ENCOUNTER — PHARMACY VISIT (OUTPATIENT)
Dept: PHARMACY | Facility: CLINIC | Age: 29
End: 2024-12-04
Payer: MEDICARE

## 2024-12-04 DIAGNOSIS — I50.22 CHRONIC SYSTOLIC HEART FAILURE: ICD-10-CM

## 2024-12-04 PROCEDURE — 93798 PHYS/QHP OP CAR RHAB W/ECG: CPT | Performed by: INTERNAL MEDICINE

## 2024-12-04 NOTE — PROGRESS NOTES
Cirilo Wills  1995  [id number]  29 y.o.    [facility] [id number] CARDCHANTE      Cardiac/Pulmonary Rehab Nutrition Education    12/4/2024  Label reading education was done with program's RDN during today's visit. Label Reading Highlights handout was given with information discussed during appointment and to assist in review of label contents at home.      Signature Lara Walters RDN, LD

## 2024-12-06 ENCOUNTER — CLINICAL SUPPORT (OUTPATIENT)
Dept: CARDIAC REHAB | Facility: CLINIC | Age: 29
End: 2024-12-06
Payer: COMMERCIAL

## 2024-12-06 DIAGNOSIS — I50.22 CHRONIC SYSTOLIC HEART FAILURE: ICD-10-CM

## 2024-12-09 ENCOUNTER — DOCUMENTATION (OUTPATIENT)
Dept: CARDIAC REHAB | Facility: CLINIC | Age: 29
End: 2024-12-09

## 2024-12-09 ENCOUNTER — CLINICAL SUPPORT (OUTPATIENT)
Dept: CARDIAC REHAB | Facility: CLINIC | Age: 29
End: 2024-12-09
Payer: COMMERCIAL

## 2024-12-09 DIAGNOSIS — I50.22 CHRONIC SYSTOLIC HEART FAILURE: ICD-10-CM

## 2024-12-09 PROCEDURE — 93798 PHYS/QHP OP CAR RHAB W/ECG: CPT | Performed by: INTERNAL MEDICINE

## 2024-12-09 NOTE — PROGRESS NOTES
Cardiac Rehabilitation 60 Day Reassessment    Name: Cirilo Wills   Medical Record Number: [id number]  YOB: 1995   Age: 29 y.o.  Today’s Date: 12/9/2024   Primary Care Physician: Miriam Melchor DO   Referring Physician: Monique Blevins MD*   Program Location: [address]        General  Primary Diagnosis:   1. Dilated cardiomyopathy (Multi)  Referral to Cardiac Rehab      2. Congestive heart failure, unspecified HF chronicity, unspecified heart failure type  Referral to Cardiac Rehab         Onset/Date of Diagnosis: 06/08/24   Session #: 20  AACVPR Risk Stratification: Moderate   Falls Risk: Low    Psychosocial Initial Assessment:   PH-Q 9:   Pre: 6    Sent PH-Q 9 to MD if score > 20: No; score < 20    Pt reported/currently experiencing stress: Yes; Depression; Severity: mild  Patient uses stress management skills: Yes   History of: depression  Currently seeing a mental health provider: No  Social Support: Yes, Whom: Family  Quality of Life Survey: KCCQ (see Heart Failure Management below)  Learning Assessment:  Learning assessment/barriers: None  Preferred learning method: Auditory, Visual, Reading handout, and Writing handout  Barriers: None  Comments:    Stages of Change: Action    Psychosocial Plan  Goal Status: In progress  Psychosocial Goals: Demonstrating proper techniques for stress management, Maintain or lower PH-Q 9 score by discharge, and Identify strategies for managing depression    Psychosocial Interventions/Education:   Initial PH-Q 9 score reviewed  11/13/24 reports no new stress  12/9/24 Pt voices no psychosocial changes.    Nutrition Initial Assessment:    Diet Habit Survey: Picture Your Plate  Pre: 66  Post: To be done at discharge.    Survey results reviewed with Dietician: Not at this time    Lipids Assessment  Current Dietary Guidelines: Low sodium  Barriers to dietary change: no  Hyperlipidemia: No   Lab Results   Component Value Date    CHOL 114 07/16/2024  "   HDL 35.9 07/16/2024    LDLCALC 66 07/16/2024    TRIG 61 07/16/2024     Diabetes Assessment  History of Diabetes: No  Lab Results   Component Value Date    HGBA1C 5.3 06/08/2024      Weight Management  Weight: 140 kg (309 lb 9.6 oz)  Height: 182.9 cm (6' 0.01\")  BMI (Calculated): 41.98       Nutrition Plan  Goal Status: In progress  Nutrition Goals: Improve Diet Habit Survey score by 5-10 points by discharge, Adapt a low-sodium, DASH diet prior to discharge, Adapt a Mediterranean focused diet prior to discharge, Learn how to read and interpret nutrition labels prior to discharge, and Lose 1lb/week while enrolled in program    Nutrition Interventions/Education:   11/6/2024  Patient attended Heart Healthy Diet lecture with program RDN during today's exercise session. Heart Healthy diet tips sheet was given for further review at home and patient was encouraged to come back with any follow up questions.  Signature Lara Walters RDN, KATINA  12/4/2024  Label reading education was done with program's RDN during today's visit. Label Reading Highlights handout was given with information discussed during appointment and to assist in review of label contents at home.  Signature Lara Walters RDN, LD    Exercise Reassessment:   Current Home Exercise: No    Exercise Prescription  Exercise Prescription based on:   Duke Activity Status Index (DASI)  DASI Score: 34.7   MET Score: 7       Frequency:  3 days/week  Mode: Treadmill, NuStep, and Recumbent Cycle  Duration: 45 total aerobic minutes  Intensity: RPE 12-16  Target HR:   108-118  MET Level: 5.4  Patient wears supplemental O2: No   Modality Workload METs Duration (minutes)   1 Pre-Exercise      2 Session Warm Up   5 : 00   3 Treadmill 3.5 mph @ 2 % 4.7 15 : 00   4 NuStep 4000 120 jacobs @ Load 10 3.3 15 : 00   5 Recumbent Bike Load 9 @ 60 rpms 5.4 15 : 00   6 Final [place]                 5:00   6 Post-Exercise                  2:00      Resistance Training: No   Home " Exercise Prescription given: To be given prior to discharge from program.    Exercise Plan  Goal Status: In progress  Exercise Goals: Increase exercise MET level by 5-10% each week, Increase total exercise duration to 30-45 minutes, Obtain 150 minutes/week of moderate intensity aerobic exercise, Initiate strength training 2-3 days a week, Initiate flexibility training 2-3 days a week, and Establish a home exercise program before discharge    Exercise Interventions/Education:   10/30/2024   Stretching education was done with patient during today's rehab session. Demonstrations of standing stretches were done with patient, in addition to discussing the benefits of flexibility training. Patient was provided handout with examples of exercises and further instructions to be practiced at home. Resistance training guidelines were also discussed. Patient encouraged to report to rehab staff with any questions or concerns.  11/4/24 adjusted THR.    11/13/2024  Cardiovascular changes with exercise were discussed in today's session. Both immediate and long term effects of exercise were covered and the importance of cooling down post workout was reviewed.   12/11/2024 Exercise prescription and maintenance education was done during today's session. Discussed FITT principle, reviewed perceived exertion scale, and patient's THR was given for home practice with instructions on how to obtain. Handouts were given for personal reference. Home exercise prescription is to be given prior to discharge from program.      Other Core Components/Risk Factor Initial Assessment:  Medication adherence  Medication compliance: Yes  Uses pill box/organizer: Yes   Carries medication list: Yes   Current Medications:   Medication Documentation Review Audit       Reviewed by LISSETTE Perdomo (Nurse Practitioner) on 11/19/24 at 1157      Medication Order Taking? Sig Documenting Provider Last Dose Status   atorvastatin (Lipitor) 40 mg  tablet [id number] Yes Take 1 tablet (40 mg) by mouth once daily. Blessing Fisher MD Taking Active   carvedilol (Coreg) 6.25 mg tablet [id number] Yes Take 1 tablet (6.25 mg) by mouth 2 times daily (morning and late afternoon). Monique Blevins MD PhD Taking Active   dapagliflozin propanediol (Farxiga) 10 mg [id number] Yes Take 1 tablet (10 mg) by mouth once daily. Monique Blevins MD PhD Taking Active   pantoprazole (ProtoNix) 20 mg EC tablet [id number] Yes Take 1 tablet (20 mg) by mouth once daily in the morning. Take before meals. Do not crush, chew, or split. Monique Blevins MD PhD Taking Active   resmetirom (Rezdiffra) 100 mg tablet [id number] Yes Take 100 mg by mouth once daily. Cheyanne Cali APRN-CNP Taking Active   sacubitriL-valsartan 97 mg-103 mg (Entresto)  mg tablet [id number] Yes Take 1 tablet by mouth 2 times a day. Monique Blevins MD PhD  Active   semaglutide, weight loss, (Wegovy) 2.4 mg/0.75 mL pen injector [id number] Yes Inject 2.4 mg under the skin 1 (one) time per week for 16 doses. Miriam Melchor, DO  Active   spironolactone (Aldactone) 25 mg tablet [id number] Yes Take 1 tablet (25 mg) by mouth once daily. Monique Blevins MD PhD Taking Active   Vumerity 231 mg capsule,delayed release(DR/EC) [id number] Yes TAKE 2 CAPSULES BY MOUTH 2 TIMES A DAY. Dunia Whitmore, APRN-CNP Taking Active                   Is patient prescribed Nitroglycerin? No  Blood Pressure Management  History of Hypertension: Yes   Medication Changes: No   Resting BP:  pre-ex. 100/58 and post-ex. 98/70      Heart Failure Management  Hx of Heart Failure: Yes, Type (selection): HFrEF  Most recent EF: 25-30%     Onset of heart failure diagnosis: 6/8/24  Last heart failure hospitalization: None  Number of HF admissions per year: 0    Symptoms: Fatigue with exertion and Shortness of breath  Is there a family Hx of HF: No   Does patient obtain daily weight No     KCCQ survey: Pre: 69.79  Post:  TBD    Heart Failure Reassessment Heart Failure Symptom Management: Reassessed every 30 days while in program.   Unplanned MD and/or ED Heart Failure visit: No  Hospitalization since starting program/last assessment: No  MD contacted regarding Heart Failure symptoms: No     Heart Failure Goals: Able to verbalize signs and symptoms of fluid retention and when to contact MD, Adapt a low sodium diet and verbalize guidelines, Obtain daily weight and verbalize proper method of obtaining weights    Smoking/Tobacco Assessment  Social History     Tobacco Use    Smoking status: Former     Types: Cigarettes    Smokeless tobacco: Never    Tobacco comments:     Social use in [place]   Vaping Use    Vaping status: Some Days    Substances: THC    Devices: Disposable   Substance Use Topics    Alcohol use: Yes     Alcohol/week: 6.0 standard drinks of alcohol     Types: 6 Standard drinks or equivalent per week    Drug use: Yes     Frequency: 3.0 times per week     Types: Marijuana      Other Core Component Plan  Goal Status: In progress  Other Core Component Goals: Verbalize medication usage and drug actions by discharge, Begin tobacco cessation program, Set tobacco cessation quit date while enrolled, and Achieve resting BP of < 130/80 by discharge    Other Core Component Interventions/Education:   10/23/2024  Risk factors of cardiovascular disease were discussed today's. Education on modifiable and non-modifiable risk factors of CVD and recommendations to manage them was done today. Handouts were provided along with further reference. Patient was educated to comeback with further questions.  11/12/24 Pt had already received Heart Failure Education Booklet on 7/1/2024 from his doctor per review of pt record.  11/20/2024  Education on sodium intake and consequences of high blood pressure was done during today's session. Discussed with patient the risks of excess levels of sodium and how to decrease dietary intake with provided list  of substitutions. Handouts were given for home reference.       Individual Patient Goals:  Climb 2 flight of stairs without getting tired.   Lose weight by 5 lb.   Goal Status: In progress    Staff Comments:  Mr. Wills has attended 20 sessions of Cardiac Rehab.  He is doing very well.  He pushes himself despite having knee pain and is doing intervals.  He continues to work towards his goals and outcomes.  He has no cardiac complaints.  No ectopy was noted on the monitor.    Rehab Staff Signature: Troy Bell RN, EP

## 2024-12-11 ENCOUNTER — CLINICAL SUPPORT (OUTPATIENT)
Dept: CARDIAC REHAB | Facility: CLINIC | Age: 29
End: 2024-12-11
Payer: COMMERCIAL

## 2024-12-11 DIAGNOSIS — I50.22 CHRONIC SYSTOLIC HEART FAILURE: ICD-10-CM

## 2024-12-11 PROCEDURE — 93798 PHYS/QHP OP CAR RHAB W/ECG: CPT | Performed by: INTERNAL MEDICINE

## 2024-12-11 NOTE — PROGRESS NOTES
Cardiac Rehab Education  Cirilo Wills   [id number]    12/11/2024  Exercise prescription and maintenance education was done during today's session. Discussed FITT principle, reviewed perceived exertion scale, and patient's THR was given for home practice with instructions on how to obtain. Handouts were given for personal reference. Home exercise prescription is to be given prior to discharge from program.      Signature Troy Bell RN

## 2024-12-13 ENCOUNTER — CLINICAL SUPPORT (OUTPATIENT)
Dept: CARDIAC REHAB | Facility: CLINIC | Age: 29
End: 2024-12-13
Payer: COMMERCIAL

## 2024-12-13 DIAGNOSIS — I50.22 CHRONIC SYSTOLIC HEART FAILURE: ICD-10-CM

## 2024-12-13 PROCEDURE — 93798 PHYS/QHP OP CAR RHAB W/ECG: CPT | Performed by: INTERNAL MEDICINE

## 2024-12-14 ENCOUNTER — OFFICE VISIT (OUTPATIENT)
Dept: PRIMARY CARE | Facility: CLINIC | Age: 29
End: 2024-12-14
Payer: COMMERCIAL

## 2024-12-14 VITALS
HEART RATE: 76 BPM | BODY MASS INDEX: 40.5 KG/M2 | OXYGEN SATURATION: 94 % | SYSTOLIC BLOOD PRESSURE: 114 MMHG | WEIGHT: 298.6 LBS | TEMPERATURE: 98.3 F | DIASTOLIC BLOOD PRESSURE: 73 MMHG | RESPIRATION RATE: 14 BRPM

## 2024-12-14 DIAGNOSIS — J01.90 ACUTE NON-RECURRENT SINUSITIS, UNSPECIFIED LOCATION: Primary | ICD-10-CM

## 2024-12-14 PROCEDURE — 1036F TOBACCO NON-USER: CPT | Performed by: FAMILY MEDICINE

## 2024-12-14 PROCEDURE — 99214 OFFICE O/P EST MOD 30 MIN: CPT | Performed by: FAMILY MEDICINE

## 2024-12-14 RX ORDER — FLUTICASONE PROPIONATE 50 MCG
1 SPRAY, SUSPENSION (ML) NASAL DAILY
Qty: 16 G | Refills: 11 | Status: SHIPPED | OUTPATIENT
Start: 2024-12-14 | End: 2025-12-14

## 2024-12-14 RX ORDER — AMOXICILLIN AND CLAVULANATE POTASSIUM 875; 125 MG/1; MG/1
875 TABLET, FILM COATED ORAL 2 TIMES DAILY
Qty: 20 TABLET | Refills: 0 | Status: SHIPPED | OUTPATIENT
Start: 2024-12-14 | End: 2024-12-24

## 2024-12-14 NOTE — PROGRESS NOTES
Subjective   Patient ID: Cirilo Wills is a 29 y.o. male who presents for URI (Pt presents for URI sxs - cough, sinus congestion, draining, had sore throat, no irritation, voice was hoarse, chills - X19 days. Pt took Robitussin, cough drops./Pt had flu vaccine.).    HPI  PMHx  Low EF  /  obesity / M.S .     Sxs as in nurse note.  Also documented sxs in his Email in [employer].     No recent illnesses.  No ill contact .     No hx asthma   /   Review of Systems    Objective   /73 (BP Location: Left arm, Patient Position: Sitting, BP Cuff Size: Large adult)   Pulse 76   Temp 36.8 °C (98.3 °F) (Temporal)   Resp 14   Wt 135 kg (298 lb 9.6 oz)   SpO2 94%   BMI 40.50 kg/m²     Physical Exam  Constitutional:       General: He is not in acute distress.  HENT:      Head: Normocephalic and atraumatic.      Right Ear: Tympanic membrane normal.      Left Ear: Tympanic membrane normal.      Nose: Congestion present.      Mouth/Throat:      Pharynx: Posterior oropharyngeal erythema present.   Cardiovascular:      Rate and Rhythm: Normal rate and regular rhythm.   Pulmonary:      Effort: Pulmonary effort is normal.      Breath sounds: Normal breath sounds.      Comments:    Lymphadenopathy:      Cervical: Cervical adenopathy present.   Neurological:      Mental Status: He is alert.       Assessment/Plan   Problem List Items Addressed This Visit    None  Visit Diagnoses       Acute non-recurrent sinusitis, unspecified location    -  Primary    Relevant Medications    amoxicillin-pot clavulanate (Augmentin) 875-125 mg tablet    fluticasone (Flonase) 50 mcg/actuation nasal spray          Followup if not improving    Short steroid burst if sxs not improving by mid next week .    RODRIGUEZ Teague MD   Detail Level: Detailed

## 2024-12-16 ENCOUNTER — OFFICE VISIT (OUTPATIENT)
Dept: NEUROLOGY | Facility: CLINIC | Age: 29
End: 2024-12-16
Payer: COMMERCIAL

## 2024-12-16 ENCOUNTER — CLINICAL SUPPORT (OUTPATIENT)
Dept: CARDIAC REHAB | Facility: CLINIC | Age: 29
End: 2024-12-16
Payer: COMMERCIAL

## 2024-12-16 VITALS
RESPIRATION RATE: 18 BRPM | BODY MASS INDEX: 40.82 KG/M2 | HEART RATE: 88 BPM | WEIGHT: 301 LBS | DIASTOLIC BLOOD PRESSURE: 74 MMHG | SYSTOLIC BLOOD PRESSURE: 126 MMHG

## 2024-12-16 DIAGNOSIS — G35 MULTIPLE SCLEROSIS (MULTI): Primary | ICD-10-CM

## 2024-12-16 DIAGNOSIS — I50.22 CHRONIC SYSTOLIC HEART FAILURE: ICD-10-CM

## 2024-12-16 PROCEDURE — 99214 OFFICE O/P EST MOD 30 MIN: CPT | Performed by: NURSE PRACTITIONER

## 2024-12-16 PROCEDURE — 93798 PHYS/QHP OP CAR RHAB W/ECG: CPT | Performed by: INTERNAL MEDICINE

## 2024-12-16 ASSESSMENT — PAIN SCALES - GENERAL: PAINLEVEL_OUTOF10: 0-NO PAIN

## 2024-12-16 NOTE — PROGRESS NOTES
Onset: 03/13  Diagnosis of MS: 07/14  Disease course at onset: RR  Current disease course: RR  Previous disease therapies: IVMP in 2013, Tecfidera since end of 8/14 stopped d/t insurance 2021.  Current disease therapies: Vumerity   Most recent MRI brain: 10/2023- stable  Most recent MRI cervical spine: 03/11/15  Most recent MRI thoracic spine: not done  CSF: 03/2013  JCV serology and date: not done  CBC:  WBC- 9.1  ALC-1.58    Subjective   Cirilo Wills is a 29 y.o. right hand male here for a follow up of his MS, he takes Vumerity and tolerating well. Last MRI of the brain October 2024 showed a new non enhancing lesion. Reports no new MS symptoms.    ROS  Reports loose stools, bladder issues, stable gait, sinus infection on ABT, and no trouble swallowing.    Objective   Neurological Exam  Mental Status  Alert. Oriented to person, place, time and situation. Oriented to person, place, and time. Speech is normal. Language is fluent with no aphasia. Attention and concentration are normal.    Cranial Nerves  CN I: Sense of smell is normal.  CN II: Right visual acuity: Finger movement. Left visual acuity: Finger movement. Right normal visual field. Left normal visual field. Right funduscopic exam: disc intact. Left funduscopic exam: disc intact.  CN III, IV, VI: Extraocular movements intact bilaterally. Normal lids and orbits bilaterally. Pupils equal round and reactive to light bilaterally.  CN V: Facial sensation is normal.  CN VII: Full and symmetric facial movement.  CN VIII: Hearing is normal.  CN IX, X: Palate elevates symmetrically  CN XI: Shoulder shrug strength is normal.  CN XII: Tongue midline without atrophy or fasciculations.    Motor  Normal muscle bulk throughout. Normal muscle tone. Strength is 5/5 throughout all four extremities.    Sensory  Light touch is normal in upper and lower extremities. Decreased sensation on right cheek..     Reflexes                                            Right                       Left  Brachioradialis                    2+                         2+  Biceps                                 2+                         2+  Triceps                                2+                         2+  Patellar                                2+                         2+   Right palmar grasp present. Left palmar grasp present.    Coordination    Finger-to-nose, rapid alternating movements and heel-to-shin normal bilaterally without dysmetria.  9 hole peg test: Right hand 18 sec. Left hand 17.2 sec..    Gait  Casual gait is normal including stance, stride, and arm swing. Timed get up and go test: 5 seconds.    Physical Exam  Constitutional:       Appearance: Normal appearance.   HENT:      Head: Normocephalic.      Right Ear: Tympanic membrane normal.      Left Ear: Tympanic membrane normal.      Nose: Nose normal.      Mouth/Throat:      Mouth: Mucous membranes are moist.   Eyes:      General: Lids are normal.      Extraocular Movements: Extraocular movements intact.      Pupils: Pupils are equal, round, and reactive to light.   Pulmonary:      Effort: Pulmonary effort is normal.   Musculoskeletal:         General: Normal range of motion.      Cervical back: Full passive range of motion without pain and normal range of motion.   Skin:     General: Skin is warm and dry.   Neurological:      Mental Status: He is alert and oriented to person, place, and time.      Motor: Motor strength is normal.     Coordination: Coordination is intact.      Deep Tendon Reflexes:      Reflex Scores:       Tricep reflexes are 2+ on the right side and 2+ on the left side.       Bicep reflexes are 2+ on the right side and 2+ on the left side.       Brachioradialis reflexes are 2+ on the right side and 2+ on the left side.       Patellar reflexes are 2+ on the right side and 2+ on the left side.  Psychiatric:         Attention and Perception: Attention normal.         Mood and Affect: Mood normal.         Speech: Speech  normal.         Behavior: Behavior normal. Behavior is cooperative.         Thought Content: Thought content normal.         Cognition and Memory: Cognition normal.         Judgment: Judgment normal.     Provider Impression  Cirilo Wills is a 29 y.o. right hand male here for a follow up of his MS, he takes Vumerity and tolerating well. Last MRI of the brain October 2024 showed a new non enhancing lesion.      We discussed the importance of living healthy life style with diet and exercise and the importance of V-D in patient's with MS.    The total face to face appointment was 30 minutes and more than 50% of the visit was spent counseling and coordination of care.    I personally reviewed laboratory, radiographic, and medical studies which were pertinent for today's visit.    Plan  - Continue Vumerity.  - Continue cardiac rehab.  - MRI April 2025 to evaluate new non-enhancing lesson.  - Follow up 6 months.

## 2024-12-17 ENCOUNTER — LAB (OUTPATIENT)
Dept: LAB | Facility: LAB | Age: 29
End: 2024-12-17
Payer: COMMERCIAL

## 2024-12-17 DIAGNOSIS — G35 MULTIPLE SCLEROSIS (MULTI): ICD-10-CM

## 2024-12-17 LAB
ALBUMIN SERPL BCP-MCNC: 4.6 G/DL (ref 3.4–5)
ALP SERPL-CCNC: 62 U/L (ref 33–120)
ALT SERPL W P-5'-P-CCNC: 31 U/L (ref 10–52)
AST SERPL W P-5'-P-CCNC: 19 U/L (ref 9–39)
BASOPHILS # BLD AUTO: 0.03 X10*3/UL (ref 0–0.1)
BASOPHILS NFR BLD AUTO: 0.5 %
BILIRUB DIRECT SERPL-MCNC: 0.1 MG/DL (ref 0–0.3)
BILIRUB SERPL-MCNC: 0.5 MG/DL (ref 0–1.2)
EOSINOPHIL # BLD AUTO: 0.21 X10*3/UL (ref 0–0.7)
EOSINOPHIL NFR BLD AUTO: 3.4 %
ERYTHROCYTE [DISTWIDTH] IN BLOOD BY AUTOMATED COUNT: 12.4 % (ref 11.5–14.5)
HCT VFR BLD AUTO: 43.7 % (ref 41–52)
HGB BLD-MCNC: 14.7 G/DL (ref 13.5–17.5)
IMM GRANULOCYTES # BLD AUTO: 0.02 X10*3/UL (ref 0–0.7)
IMM GRANULOCYTES NFR BLD AUTO: 0.3 % (ref 0–0.9)
LYMPHOCYTES # BLD AUTO: 1.49 X10*3/UL (ref 1.2–4.8)
LYMPHOCYTES NFR BLD AUTO: 23.9 %
MCH RBC QN AUTO: 31.1 PG (ref 26–34)
MCHC RBC AUTO-ENTMCNC: 33.6 G/DL (ref 32–36)
MCV RBC AUTO: 92 FL (ref 80–100)
MONOCYTES # BLD AUTO: 0.75 X10*3/UL (ref 0.1–1)
MONOCYTES NFR BLD AUTO: 12 %
NEUTROPHILS # BLD AUTO: 3.73 X10*3/UL (ref 1.2–7.7)
NEUTROPHILS NFR BLD AUTO: 59.9 %
NRBC BLD-RTO: 0 /100 WBCS (ref 0–0)
PLATELET # BLD AUTO: 256 X10*3/UL (ref 150–450)
PROT SERPL-MCNC: 7 G/DL (ref 6.4–8.2)
RBC # BLD AUTO: 4.73 X10*6/UL (ref 4.5–5.9)
WBC # BLD AUTO: 6.2 X10*3/UL (ref 4.4–11.3)

## 2024-12-17 PROCEDURE — 85025 COMPLETE CBC W/AUTO DIFF WBC: CPT

## 2024-12-17 PROCEDURE — 80076 HEPATIC FUNCTION PANEL: CPT

## 2024-12-17 PROCEDURE — 36415 COLL VENOUS BLD VENIPUNCTURE: CPT

## 2024-12-18 ENCOUNTER — CLINICAL SUPPORT (OUTPATIENT)
Dept: CARDIAC REHAB | Facility: CLINIC | Age: 29
End: 2024-12-18
Payer: COMMERCIAL

## 2024-12-18 DIAGNOSIS — I50.22 CHRONIC SYSTOLIC HEART FAILURE: ICD-10-CM

## 2024-12-18 PROCEDURE — 93798 PHYS/QHP OP CAR RHAB W/ECG: CPT | Performed by: INTERNAL MEDICINE

## 2024-12-18 NOTE — PROGRESS NOTES
Cardiac Rehab Education  Cirilo Wills   [id number]    12/18/2024  Coronary artery disease education was done during today's session. Signs and symptoms of possible heart attack, risk factors for prevention, and lifestyle behavior modifications were all explained. Handout for personal use was given.      Signature Emily Angulo RN

## 2024-12-20 ENCOUNTER — CLINICAL SUPPORT (OUTPATIENT)
Dept: CARDIAC REHAB | Facility: CLINIC | Age: 29
End: 2024-12-20
Payer: COMMERCIAL

## 2024-12-20 DIAGNOSIS — I50.22 CHRONIC SYSTOLIC HEART FAILURE: ICD-10-CM

## 2024-12-20 PROCEDURE — 93798 PHYS/QHP OP CAR RHAB W/ECG: CPT | Performed by: INTERNAL MEDICINE

## 2024-12-23 ENCOUNTER — CLINICAL SUPPORT (OUTPATIENT)
Dept: CARDIAC REHAB | Facility: CLINIC | Age: 29
End: 2024-12-23
Payer: COMMERCIAL

## 2024-12-23 DIAGNOSIS — I50.22 CHRONIC SYSTOLIC HEART FAILURE: ICD-10-CM

## 2024-12-23 PROCEDURE — 93798 PHYS/QHP OP CAR RHAB W/ECG: CPT | Performed by: INTERNAL MEDICINE

## 2024-12-27 ENCOUNTER — CLINICAL SUPPORT (OUTPATIENT)
Dept: CARDIAC REHAB | Facility: CLINIC | Age: 29
End: 2024-12-27
Payer: COMMERCIAL

## 2024-12-27 DIAGNOSIS — I50.22 CHRONIC SYSTOLIC HEART FAILURE: ICD-10-CM

## 2024-12-27 PROCEDURE — 93798 PHYS/QHP OP CAR RHAB W/ECG: CPT | Performed by: INTERNAL MEDICINE

## 2024-12-30 ENCOUNTER — CLINICAL SUPPORT (OUTPATIENT)
Dept: CARDIAC REHAB | Facility: CLINIC | Age: 29
End: 2024-12-30
Payer: COMMERCIAL

## 2024-12-30 DIAGNOSIS — I50.22 CHRONIC SYSTOLIC HEART FAILURE: ICD-10-CM

## 2024-12-30 PROCEDURE — 93798 PHYS/QHP OP CAR RHAB W/ECG: CPT | Performed by: INTERNAL MEDICINE

## 2024-12-30 NOTE — PROGRESS NOTES
Phase II Cardiac Rehabilitation Performance Measure Report    Patient Name: Cirilo Wills   : 1995   MRN: [id number]   : Troy Bell RN  Start Date: 10/21/24  End Date: 24  # Sessions Completed: 27     Patient's Stated Goals:   Climb 2 flights of stairs exhausted by D/C  Achieved: Yes    Weight loss 5 lbs by D/c  Achieved: Yes  Comments: continue to work on goals    Program Outcome Goals:    Tobacco Cessation  Social History     Tobacco Use    Smoking status: Former     Types: Cigarettes    Smokeless tobacco: Never    Tobacco comments:     Social use in [employer]   Vaping Use    Vaping status: Some Days    Substances: THC    Devices: Disposable   Substance Use Topics    Alcohol use: Yes     Alcohol/week: 6.0 standard drinks of alcohol     Types: 6 Standard drinks or equivalent per week    Drug use: Yes     Frequency: 3.0 times per week     Types: Marijuana      Achieved: Yes  Comments/Long-Term Goals:    Lipids  Lab Results   Component Value Date    CHOL 114 2024    CHOL 211 (H) 2024    HDL 35.9 2024    HDL 40.8 2024    LDLCALC 66 2024    LDLCALC 137 (H) 2024    TRIG 61 2024    TRIG 168 (H) 2024     Achieved: Yes  Comments/Long-Term Goals: contiue to work towards outcomes    Physical Activity  Duration   Pre: 24 minutes   Post: 45 minutes   % Change: 88  METS (Intensity)   Pre: 2.2   Post: 4.9   % Change: 123    Achieved: Yes  Comments/Long-Term Goals: continue to exercise     Diabetes     Lab Results   Component Value Date    HGBA1C 5.3 2024    HGBA1C 5.4 2022      Achieved: Yes  Comments/Long-Term Goals: N/A    Nutrition:   Picture Your Plate  Pre: 66  Post: 64    Achieved: Yes  Comments/Long-Term Goals: continue to make healthy choices    Psychosocial:   PHQ-9  Pre: 6  Post: 9    Achieved: Yes  Comments/Long-Term Goals: contuine to use stress management skills    Rehab Staff Signature: Troy Bell RN  Date: 2024

## 2024-12-30 NOTE — PATIENT INSTRUCTIONS
Cardiac Rehabilitation: Home Exercise Handout    The American College of Sports Medicine and American Heart Association recommends engaging in aerobic exercise 5 to 7 times per week, and staying within your target heart rate range for a minimum of 30 to 60 minutes per session. The following guidelines and components of your home exercise program are similar to those in your Cardiac Rehabilitation program.     The following are recommendations for your exercise routine based on the FITT principle:    Frequency:   5 to 7 days per week    Intensity:   Target Heart Rate = 108-118     RPE = 12 - 16    Time:   30+ minutes per session; 150 total minutes per week    Type:   Aerobic exercise    Specific Recommendations: continue to exercise 5 days a week      Warm Up  It is important to start off slow to give your body a change to get used to the increased workload and transition from rest to exercise. Your warm up should consist of a slow paced activity for 2-5 minutes.    Example:  2 - 5 minutes of walking or marching in place     Aerobic Exercises   (Cardio)   If you have home exercise equipment, this is where you can use it! If not, there are still lots of ways to be active at home or outside. The minimum time for aerobic exercise is 10 minutes, building up to 30 - 60 minutes.    Examples: walking, cycling, or dancing     Resistance Training   (Strength, Free Weights, Resistance Bands, Body)   Body weight or resistance exercises can be done at minimum 2 times per week and are important to build up your physical strength and endurance. Your resistance routine should include 6 to 10 different exercises that can be done 10-15 times (repetitions) repeating 1-2 sets. Spend 15 - 20 minutes on these exercises.     Examples: See suggestions from resistance training handout or ask staff       Cool Down   It is important to allow your heart rate and blood pressure to gradually recover from the exercise training phase. The  "cool-down period should be about 5 minutes in total and be incorporated with flexibility or stretching exercises.    Examples: See suggestions from stretching handout      If you're not using a target heart rate, you can ask yourself \"How difficult is the work I am doing?\" By using the RPE scale attached, work should not be too light or too hard. Aim to work at the 12 - 15 level on this scale. You can also check in on your shortness of breath using the RPD scale, where you should be at or below a 3 - Moderate shortness of breath while exercising.                        If you have any additional questions about your home exercise plan, feel free to contact your  at rehabilitation.     Rehab Staff Signed: Troy Bell RN   "

## 2025-01-03 ENCOUNTER — APPOINTMENT (OUTPATIENT)
Dept: CARDIAC REHAB | Facility: CLINIC | Age: 30
End: 2025-01-03

## 2025-01-04 PROCEDURE — RXMED WILLOW AMBULATORY MEDICATION CHARGE

## 2025-01-06 ENCOUNTER — DOCUMENTATION (OUTPATIENT)
Dept: CARDIAC REHAB | Facility: CLINIC | Age: 30
End: 2025-01-06

## 2025-01-06 ENCOUNTER — APPOINTMENT (OUTPATIENT)
Dept: PRIMARY CARE | Facility: CLINIC | Age: 30
End: 2025-01-06
Payer: COMMERCIAL

## 2025-01-06 ENCOUNTER — APPOINTMENT (OUTPATIENT)
Dept: CARDIAC REHAB | Facility: CLINIC | Age: 30
End: 2025-01-06

## 2025-01-06 NOTE — PROGRESS NOTES
Cardiac Rehabilitation Discharge Summary    Name: Cirilo Wills   Medical Record Number: [id number]  YOB: 1995   Age: 29 y.o.  Today’s Date: 1/7/2025   Primary Care Physician: Miriam Melchor DO   Referring Physician: Monique Blevins MD*   Program Location: [address]        General  Primary Diagnosis:   1. Dilated cardiomyopathy (Multi)  Referral to Cardiac Rehab      2. Congestive heart failure, unspecified HF chronicity, unspecified heart failure type  Referral to Cardiac Rehab         Onset/Date of Diagnosis: 06/08/24   Session #: 20  AACVPR Risk Stratification: Moderate   Falls Risk: Low    Psychosocial Discharge:   PH-Q 9:   Pre: 6  Post: 9    Sent PH-Q 9 to MD if score > 20: No; score < 20    Pt reported/currently experiencing stress: Yes; Depression; Severity: mild  Patient uses stress management skills: Yes   History of: depression  Currently seeing a mental health provider: No  Social Support: Yes, Whom: Family  Quality of Life Survey: KCCQ (see Heart Failure Management below)  Learning Assessment:  Learning assessment/barriers: None  Preferred learning method: Auditory, Visual, Reading handout, and Writing handout  Barriers: None  Comments:    Stages of Change: Maintenance    Psychosocial Plan  Goal Status: In progress  Psychosocial Goals: Demonstrating proper techniques for stress management, Maintain or lower PH-Q 9 score by discharge, and Identify strategies for managing depression    Psychosocial Interventions/Education:   Initial PH-Q 9 score reviewed  11/13/24 reports no new stress  12/9/24 Pt voices no psychosocial changes.    Nutrition Discharge:    Diet Habit Survey: Picture Your Plate  Pre: 66  Post: 64    Survey results reviewed with Dietician: Yes    Lipids Assessment  Current Dietary Guidelines: Low sodium  Barriers to dietary change: no  Hyperlipidemia: No   Lab Results   Component Value Date    CHOL 114 07/16/2024    HDL 35.9 07/16/2024    LDLCALC 66  "07/16/2024    TRIG 61 07/16/2024     Diabetes Assessment  History of Diabetes: No  Lab Results   Component Value Date    HGBA1C 5.3 06/08/2024      Weight Management  Weight: 140 kg (309 lb 9.6 oz)  Height: 182.9 cm (6' 0.01\")  BMI (Calculated): 41.98       Nutrition Plan  Goal Status: In progress  Nutrition Goals: Improve Diet Habit Survey score by 5-10 points by discharge, Adapt a low-sodium, DASH diet prior to discharge, Adapt a Mediterranean focused diet prior to discharge, Learn how to read and interpret nutrition labels prior to discharge, and Lose 1lb/week while enrolled in program    Nutrition Interventions/Education:   11/6/2024  Patient attended Heart Healthy Diet lecture with program RDN during today's exercise session. Heart Healthy diet tips sheet was given for further review at home and patient was encouraged to come back with any follow up questions.  Signature Lara Walters RDN, KATINA  12/4/2024  Label reading education was done with program's RDN during today's visit. Label Reading Highlights handout was given with information discussed during appointment and to assist in review of label contents at home.  Signature Lara Walters RDN, LD    Exercise Discharge:  Current Home Exercise: yes    Exercise Prescription  Exercise Prescription based on:   [facility] Activity Status Index (DASI)  DASI Score: 34.7   MET Score: 7       Frequency:  3 days/week  Mode: Treadmill, NuStep, and Recumbent Cycle  Duration: 45 total aerobic minutes  Intensity: RPE 12-16  Target HR:   108-118  MET Level: 6.1  Patient wears supplemental O2: No   Modality Workload METs Duration (minutes)   1 Pre-Exercise      2 Session Warm Up   5 : 00   3 Treadmill 3.5 mph @ 2.5 % 4.9 15 : 00   4 NuStep 4000 148 [place] @ Load 10 3.9 15 : 00   5 Recumbent Bike Load 10 @ 60 rpms 6.1 15 : 00   6 Final [place]                 5:00   6 Post-Exercise                  2:00      Resistance Training: No   Home Exercise Prescription given: " Yes    Exercise Plan  Goal Status: In progress  Exercise Goals: Increase exercise MET level by 5-10% each week, Increase total exercise duration to 30-45 minutes, Obtain 150 minutes/week of moderate intensity aerobic exercise, Initiate strength training 2-3 days a week, Initiate flexibility training 2-3 days a week, and Establish a home exercise program before discharge    Exercise Interventions/Education:   10/30/2024   Stretching education was done with patient during today's rehab session. Demonstrations of standing stretches were done with patient, in addition to discussing the benefits of flexibility training. Patient was provided handout with examples of exercises and further instructions to be practiced at home. Resistance training guidelines were also discussed. Patient encouraged to report to rehab staff with any questions or concerns.  11/4/24 adjusted THR.    11/13/2024  Cardiovascular changes with exercise were discussed in today's session. Both immediate and long term effects of exercise were covered and the importance of cooling down post workout was reviewed.   12/11/2024 Exercise prescription and maintenance education was done during today's session. Discussed FITT principle, reviewed perceived exertion scale, and patient's THR was given for home practice with instructions on how to obtain. Handouts were given for personal reference. Home exercise prescription is to be given prior to discharge from program.      Other Core Components/Risk Factor Discharge:  Medication adherence  Medication compliance: Yes  Uses pill box/organizer: Yes   Carries medication list: Yes   Current Medications:   Medication Documentation Review Audit       Reviewed by Cecilia Latif MA (Medical Assistant) on 12/16/24 at 1344      Medication Order Taking? Sig Documenting Provider Last Dose Status   amoxicillin-pot clavulanate (Augmentin) 875-125 mg tablet [id number]  Take 1 tablet (875 mg) by mouth 2 times a day for 10 days.  Jessica Teague MD  Active   atorvastatin (Lipitor) 40 mg tablet [id number] No Take 1 tablet (40 mg) by mouth once daily. Blessing Fisher MD Taking Active   carvedilol (Coreg) 6.25 mg tablet [id number] No Take 1 tablet (6.25 mg) by mouth 2 times daily (morning and late afternoon). Monique Blevins MD PhD Taking Active   dapagliflozin propanediol (Farxiga) 10 mg [id number] No Take 1 tablet (10 mg) by mouth once daily. Monique Blevins MD PhD Taking Active   fluticasone (Flonase) 50 mcg/actuation nasal spray [id number]  Administer 1 spray into each nostril once daily. Shake gently. Before first use, prime pump. After use, clean tip and replace cap. Jessica Teague MD  Active   pantoprazole (ProtoNix) 20 mg EC tablet [id number] No Take 1 tablet (20 mg) by mouth once daily in the morning. Take before meals. Do not crush, chew, or split. Monique Blevins MD PhD Taking Active   resmetirom (Rezdiffra) 100 mg tablet [id number] No Take 100 mg by mouth once daily. Cheyanne Cali APRN-CNP Taking Active   sacubitriL-valsartan 97 mg-103 mg (Entresto)  mg tablet [id number]  Take 1 tablet by mouth 2 times a day. Monique Blevins MD PhD  Active   semaglutide, weight loss, (Wegovy) 2.4 mg/0.75 mL pen injector [id number]  Inject 2.4 mg under the skin 1 (one) time per week for 16 doses. Miriam Melchor, DO  Active   spironolactone (Aldactone) 25 mg tablet [id number] No Take 1 tablet (25 mg) by mouth once daily. Monique Blevins MD PhD Taking Active   Vumerity 231 mg capsule,delayed release(DR/EC) [id number] No TAKE 2 CAPSULES BY MOUTH 2 TIMES A DAY. Dunia Whitmore, APRN-CNP Taking Active                   Is patient prescribed Nitroglycerin? No  Blood Pressure Management  History of Hypertension: Yes   Medication Changes: No   Resting BP:  pre-ex. 100/58 and post-ex. 98/70      Heart Failure Management  Hx of Heart Failure: Yes, Type (selection): HFrEF  Most recent EF: 25-30%     Onset of heart failure diagnosis:  6/8/24  Last heart failure hospitalization: None  Number of HF admissions per year: 0    Symptoms: Fatigue with exertion and Shortness of breath  Is there a family Hx of HF: No   Does patient obtain daily weight No     KCCQ survey: Pre: 69.79  Post: 88.54    Heart Failure Reassessment Heart Failure Symptom Management: Reassessed every 30 days while in program.   Unplanned MD and/or ED Heart Failure visit: No  Hospitalization since starting program/last assessment: No  MD contacted regarding Heart Failure symptoms: No     Heart Failure Goals: Able to verbalize signs and symptoms of fluid retention and when to contact MD, Adapt a low sodium diet and verbalize guidelines, Obtain daily weight and verbalize proper method of obtaining weights    Smoking/Tobacco Assessment  Social History     Tobacco Use    Smoking status: Former     Types: Cigarettes    Smokeless tobacco: Never    Tobacco comments:     Social use in college   Vaping Use    Vaping status: Some Days    Substances: THC    Devices: Disposable   Substance Use Topics    Alcohol use: Yes     Alcohol/week: 6.0 standard drinks of alcohol     Types: 6 Standard drinks or equivalent per week    Drug use: Yes     Frequency: 3.0 times per week     Types: Marijuana      Other Core Component Plan  Goal Status: In progress  Other Core Component Goals: Verbalize medication usage and drug actions by discharge, Begin tobacco cessation program, Set tobacco cessation quit date while enrolled, and Achieve resting BP of < 130/80 by discharge    Other Core Component Interventions/Education:   10/23/2024  Risk factors of cardiovascular disease were discussed today's. Education on modifiable and non-modifiable risk factors of CVD and recommendations to manage them was done today. Handouts were provided along with further reference. Patient was educated to comeback with further questions.  11/12/24 Pt had already received Heart Failure Education Booklet on 7/1/2024 from his doctor  per review of pt record.  11/20/2024  Education on sodium intake and consequences of high blood pressure was done during today's session. Discussed with patient the risks of excess levels of sodium and how to decrease dietary intake with provided list of substitutions. Handouts were given for home reference.    12/18/2024  Coronary artery disease education was done during today's session. Signs and symptoms of possible heart attack, risk factors for prevention, and lifestyle behavior modifications were all explained. Handout for personal use was given.         Individual Patient Goals:  Climb 2 flight of stairs without getting tired.   Lose weight by 5 lb.     Goal Status: In progress    Staff Comments:  Mr. Wills has completed his Cardiac Rehab at 27 sessions.  He did very well and made good progress towards his goals and outcomes.  He increased his exercise duration by 88% and exercise intensity/METS by 123%.  He lost 13.6 lbs while in the program.  He met his personal goals.  He plans on continuing his exercise routine on his own.  He has received some education on his cardiovascular health, diet and exercise and lifestyle changes.    Rehab Staff Signature: Troy Bell, RN, EP

## 2025-01-08 ENCOUNTER — APPOINTMENT (OUTPATIENT)
Dept: CARDIAC REHAB | Facility: CLINIC | Age: 30
End: 2025-01-08

## 2025-01-08 ENCOUNTER — TELEMEDICINE (OUTPATIENT)
Dept: CARDIOLOGY | Facility: HOSPITAL | Age: 30
End: 2025-01-08
Payer: COMMERCIAL

## 2025-01-08 VITALS — HEART RATE: 100 BPM | DIASTOLIC BLOOD PRESSURE: 80 MMHG | SYSTOLIC BLOOD PRESSURE: 111 MMHG

## 2025-01-08 DIAGNOSIS — I50.9 CONGESTIVE HEART FAILURE, UNSPECIFIED HF CHRONICITY, UNSPECIFIED HEART FAILURE TYPE: Primary | ICD-10-CM

## 2025-01-08 PROCEDURE — 99214 OFFICE O/P EST MOD 30 MIN: CPT | Performed by: STUDENT IN AN ORGANIZED HEALTH CARE EDUCATION/TRAINING PROGRAM

## 2025-01-08 PROCEDURE — 1036F TOBACCO NON-USER: CPT | Performed by: STUDENT IN AN ORGANIZED HEALTH CARE EDUCATION/TRAINING PROGRAM

## 2025-01-08 ASSESSMENT — ENCOUNTER SYMPTOMS
MEMORY LOSS: 0
ORTHOPNEA: 0
PARESTHESIAS: 1
DYSPNEA ON EXERTION: 0
SHORTNESS OF BREATH: 0
WEIGHT GAIN: 0
BRUISES/BLEEDS EASILY: 0
NEAR-SYNCOPE: 0
WEIGHT LOSS: 0
ALTERED MENTAL STATUS: 0
HEMATOCHEZIA: 0
HEMATURIA: 0
PND: 0
DECREASED APPETITE: 0
PALPITATIONS: 0
HEMATEMESIS: 0
SYNCOPE: 0
COUGH: 0

## 2025-01-08 NOTE — PATIENT INSTRUCTIONS
-Ambulatory ECG monitor for 2 days.  Please direct the patient and help to get this set up    -Cardiac MRI (structure, function, morphology).  Please direct the patient on how to get this test scheduled.  The preference will be for him to have it done on a Monday at [facility].  Our heart failure imaging cardiologist will be able to review his imaging if it is done at this time and location  -In person visit with me already arranged for February 2025

## 2025-01-08 NOTE — PROGRESS NOTES
"Referring Clinician: Dr. Zackery Tabares  Cardiologist: Dr Houston  Patient ID: Cirilo Wills   Primary Care Provider: Miriam Melchor DO   Visit Type:  Follow Up   On Call: Patient       Verbal consent was requested and obtained from patient on this date for a telehealth visit.    HPI:     29 y.o. [facility]  who presents for advanced heart failure care.  He has a past medical history significant for hyperlipidemia, hypertension, multiple sclerosis (quiescent, no flares since ~2015), hypothyroidism, obstructive sleep apnea, GERD, obesity with BMI 42 kg/m² maintained on semaglutide.  He has a history of HFrEF and on TTE 3/2024 LVEF 25-30 % LVIDD 5.5 cm, normal right ventricular systolic function.  On cardiac MRI (NOT STRESS EVALUATED), he had LVEF 30%, normal right ventricular systolic function.  Myocardium consistent with NICM, and possible evidence of past myocarditis.  He has a history of having had mild COVID May 2019 which did not require specific therapeutics, or hospitalization.  Negative autoimmune screen 4/2024  CTA coronaries 7/2024 showed normal coronary circulation and a calcium score of 0.  He was also referred to the sleep medicine team,plans for resumption of CPAP.  He was referred to cardiac rehabilitation and has completed participation.    Today's virtual visit initiated as I was contacted by one of our cardiology imaging specialists.  There is some concern that his MRI changes may be consistent with ARVC.    I discussed this with Mr. Wills.  There is no family history of unexplained premature deaths.  His 32-year-old brother had \"a weak heartbeat\" as a child but has not needed cardiovascular follow-up, and has been well since childhood.  No family history of arrhythmias apart from this.  There is no family history of sudden cardiac death  Mr. Wills himself denies palpitations.  He will occasionally have slight \"tightness\" in his chest which he attributes to muscle discomfort and is fully " "relieved with stretching.  No other cardiovascular symptoms.    He has been doing well with heart failure pharmacotherapy.    He was initiated on PPI, and this helps \"greatly\" no longer with GERD symptoms.    He is fully adherent with all prescribed medications.    He has never been hospitalized with heart failure, or cardiac complaints.    Surgical Hx:  - Nil    Social Hx:  - Smoking- nil  - THC vape pen use since 2022, uses  3-4 times a week  - ETOH- now 1 glass whisky a week, previously used 3-4 glasses of whiskey a week  - Illicit drugs- never   - Lives with brother, and parents.  Feels safe at home    Family Hx:  Specifically, there is no family history of  CAD, heart failure, ICD, PPM, LVAD, OHT, arrhythmias,  or sudden cardiac death.    Brother-\"weak heart beat\" as a child ( now 31 years, appears well)  Cousin- had multiple heart surgeries as a child , now is well ( now  30 years)  Maternal gfather- CVA in his 60s ( smoker\"    Medication reconciliation completed, see below.     Medication Documentation Review Audit       Reviewed by Deya French RN (Registered Nurse) on 01/08/25 at 1134      Medication Order Taking? Sig Documenting Provider Last Dose Status   atorvastatin (Lipitor) 40 mg tablet [id number] Yes Take 1 tablet (40 mg) by mouth once daily. Blessing Fisher MD Taking Active   carvedilol (Coreg) 6.25 mg tablet [id number] Yes Take 1 tablet (6.25 mg) by mouth 2 times daily (morning and late afternoon). Monique Blevins MD PhD Taking Active   dapagliflozin propanediol (Farxiga) 10 mg [id number] Yes Take 1 tablet (10 mg) by mouth once daily. Monique Blevins MD PhD Taking Active   fluticasone (Flonase) 50 mcg/actuation nasal spray [id number] Yes Administer 1 spray into each nostril once daily. Shake gently. Before first use, prime pump. After use, clean tip and replace cap. Jessica Teague MD  Active   pantoprazole (ProtoNix) 20 mg EC tablet [id number] Yes Take 1 tablet (20 mg) by mouth once daily " in the morning. Take before meals. Do not crush, chew, or split. Monique Blevins MD PhD Taking Active   resmetirom (Rezdiffra) 100 mg tablet [id number] Yes Take 100 mg by mouth once daily. Cheyanne Cali, APRN-CNP Taking Active   sacubitriL-valsartan 97 mg-103 mg (Entresto)  mg tablet [id number] Yes Take 1 tablet by mouth 2 times a day. Monique Blevins MD PhD  Active   semaglutide, weight loss, (Wegovy) 2.4 mg/0.75 mL pen injector [id number] Yes Inject 2.4 mg under the skin 1 (one) time per week for 16 doses. Miriam Melchor, DO  Active   spironolactone (Aldactone) 25 mg tablet [id number] Yes Take 1 tablet (25 mg) by mouth once daily. Monique Blevins MD PhD Taking Active   Vumerity 231 mg capsule,delayed release(DR/EC) [id number] Yes TAKE 2 CAPSULES BY MOUTH 2 TIMES A DAY. Dunia Whitmore, APRN-CNP Taking Active                   No Known Allergies       ROS   Negative except for as detailed in HPI    Review of Systems   Constitutional: Negative for decreased appetite, weight gain and weight loss.   HENT:  Negative for hearing loss.    Eyes:  Negative for visual disturbance.   Cardiovascular:  Negative for chest pain, dyspnea on exertion, leg swelling, near-syncope, orthopnea, palpitations, paroxysmal nocturnal dyspnea and syncope.   Respiratory:  Negative for cough and shortness of breath.    Hematologic/Lymphatic: Does not bruise/bleed easily.   Skin:  Negative for rash.   Musculoskeletal:  Negative for joint pain.   Gastrointestinal:  Negative for hematemesis, hematochezia and melena.   Genitourinary:  Negative for hematuria.   Neurological:  Positive for paresthesias.   Psychiatric/Behavioral:  Negative for altered mental status and memory loss.         Investigations:    The electronic medical record has been reviewed by me for salient history. All cardiovascular imaging and testing available in the electronic medical record, and [employer] has been reviewed.    Visit Vitals  /80   Pulse  100   Smoking Status Former      O/E:  No in person physical examination done  Very pleasant young -American man in no apparent cardiopulmonary distress via video review  AO x 4  Apparent conversational SOB: None    Lab Results   Component Value Date    WBC 6.2 12/17/2024    HGB 14.7 12/17/2024    HCT 43.7 12/17/2024    MCV 92 12/17/2024     12/17/2024       Chemistry    Lab Results   Component Value Date/Time     10/29/2024 1019    K 4.3 10/29/2024 1019     10/29/2024 1019    CO2 30 10/29/2024 1019    BUN 13 10/29/2024 1019    CREATININE 0.93 10/29/2024 1019    Lab Results   Component Value Date/Time    CALCIUM 10.1 10/29/2024 1019    ALKPHOS 62 12/17/2024 1010    AST 19 12/17/2024 1010    ALT 31 12/17/2024 1010    BILITOT 0.5 12/17/2024 1010           Transthoracic Echo (TTE) Complete    Result Date: 3/28/2024   [facility], [address] [place] [address], [address] [address]                  Tel [phone] and Fax [phone] TRANSTHORACIC ECHOCARDIOGRAM REPORT  Patient Name:      ARTURO Zamora Physician:    [id number] Lucia Houston MD Study Date:        3/27/2024            Ordering Provider:    [id number] PETER FUNK MRN/PID:           [id number]             Fellow: Accession#:        [id number]         Nurse:                Juanita Oneill RN Date of Birth/Age: 1995 / 28 years Sonographer:          Italo Bullock RDCS Gender:            M                    Additional Staff: Height:            185.42 cm            Admit Date: Weight:            150.59 kg            Admission Status:     Outpatient BSA / BMI:         2.67 m2 / 43.80      Encounter#:           [id number]                    kg/m2                                         Department Location:  [facility] Lab Blood Pressure: 134 /81 mmHg Study Type:    TRANSTHORACIC ECHO  (TTE) COMPLETE Diagnosis/ICD: Other forms of dyspnea-R06.09 Indication:    JIMÉNEZ CPT Code:      Echo Complete w Full Doppler-[id number] Patient History: Pertinent History: Abnormal EKG, JIMÉNEZ, HLD, DM, Hypothyroidism, KG, Family hx of                    heart disease. Study Detail: The following Echo studies were performed: 2D, M-Mode, Doppler and               color flow. Technically challenging study due to prominent lung               artifact and body habitus. Definity used as a contrast agent for               endocardial border definition. Total contrast used for this               procedure was 3.0 mL via IV push.  PHYSICIAN INTERPRETATION: Left Ventricle: The left ventricular systolic function is severely decreased, with an estimated ejection fraction of 25-30%. There are no regional wall motion abnormalities. The left ventricular cavity size is normal. Spectral Doppler shows an impaired relaxation pattern of left ventricular diastolic filling. Left Atrium: The left atrium is mildly dilated. Right Ventricle: The right ventricle is normal in size. There is normal right ventricular global systolic function. Right Atrium: The right atrium is normal in size. Aortic Valve: The aortic valve was not well visualized. There is no evidence of aortic valve regurgitation. The peak instantaneous gradient of the aortic valve is 6.0 mmHg. The mean gradient of the aortic valve is 3.8 mmHg. Mitral Valve: The mitral valve is normal in structure. There is trace mitral valve regurgitation. Tricuspid Valve: The tricuspid valve is structurally normal. There is trace to mild tricuspid regurgitation. Pulmonic Valve: The pulmonic valve is not well visualized. There is no indication of pulmonic valve regurgitation. Pericardium: There is no pericardial effusion noted. Aorta: The aortic root is normal.  CONCLUSIONS:  1. Left ventricular systolic function is severely decreased with a 25-30% estimated ejection fraction.  2. Spectral Doppler  shows an impaired relaxation pattern of left ventricular diastolic filling. QUANTITATIVE DATA SUMMARY: 2D MEASUREMENTS:                          Normal Ranges: LAs:           4.41 cm   (2.7-4.0cm) RVIDd:         2.73 cm   (0.9-3.6cm) IVSd:          1.09 cm   (0.6-1.1cm) LVPWd:         0.90 cm   (0.6-1.1cm) LVIDd:         5.55 cm   (3.9-5.9cm) LVIDs:         4.36 cm LV Mass Index: 80.5 g/m2 LV % FS        21.4 % LA VOLUME:                               Normal Ranges: LA Vol A4C:        91.1 ml    (22+/-6mL/m2) LA Vol A2C:        109.8 ml LA Vol BP:         102.4 ml LA Vol Index A4C:  34.1 ml/m2 LA Vol Index A2C:  41.1 ml/m2 LA Vol Index BP:   38.4 ml/m2 LA Area A4C:       26.4 cm2 LA Area A2C:       28.3 cm2 LA Major Axis A4C: 6.5 cm LA Major Axis A2C: 6.2 cm LA Vol A4C:        79.9 ml LA Vol A2C:        104.7 ml RA VOLUME BY A/L METHOD:                               Normal Ranges: RA Vol A4C:        60.0 ml    (8.3-19.5ml) RA Vol Index A4C:  22.5 ml/m2 RA Area A4C:       18.4 cm2 RA Major Axis A4C: 4.8 cm AORTA MEASUREMENTS:                    Normal Ranges: Asc Ao, d: 3.00 cm (2.1-3.4cm) LV SYSTOLIC FUNCTION BY 2D PLANIMETRY (MOD):                     Normal Ranges: EF-A4C View: 29.1 % (>=55%) EF-A2C View: 30.0 % EF-Biplane:  29.6 % LV DIASTOLIC FUNCTION:                            Normal Ranges: MV Peak E:      0.98 m/s   (0.7-1.2 m/s) MV Peak A:      0.66 m/s   (0.42-0.7 m/s) E/A Ratio:      1.50       (1.0-2.2) MV e'           0.11 m/s   (>8.0) MV lateral e'   0.13 m/s MV medial e'    0.08 m/s E/e' Ratio:     8.95       (<8.0) PulmV Sys Coleman:  55.09 cm/s PulmV Ocampo Coleman: 46.63 cm/s PulmV S/D Coleman:  1.18 MITRAL VALVE:                 Normal Ranges: MV DT: 104 msec (150-240msec) AORTIC VALVE:                                   Normal Ranges: AoV Vmax:                1.22 m/s (<=1.7m/s) AoV Peak P.0 mmHg (<20mmHg) AoV Mean PG:             3.8 mmHg (1.7-11.5mmHg) LVOT Max Coleman:            0.70 m/s  (<=1.1m/s) AoV VTI:                 21.14 cm (18-25cm) LVOT VTI:                11.92 cm LVOT Diameter:           2.01 cm  (1.8-2.4cm) AoV Area, VTI:           1.79 cm2 (2.5-5.5cm2) AoV Area,Vmax:           1.82 cm2 (2.5-4.5cm2) AoV Dimensionless Index: 0.56  RIGHT VENTRICLE: RV Basal 3.90 cm RV Mid   2.70 cm RV Major 8.1 cm TAPSE:   16.9 mm RV s'    0.18 m/s PULMONIC VALVE:                      Normal Ranges: PV Max Coleman: 1.1 m/s  (0.6-0.9m/s) PV Max P.4 mmHg Pulmonary Veins: PulmV Ocampo Coleman: 46.63 cm/s PulmV S/D Coleman:  1.18 PulmV Sys Coleman:  55.09 cm/s  [id number] Lucia Houston MD Electronically signed on 3/28/2024 at 7:09:31 PM  ** Final (Updated) **        CTA cors 2024  1.  Normal coronary anatomy without evidence of atherosclerotic  changes or stenotic disease.  2. Coronary artery calcium score is 0.    IMPRESSION:      29 y.o. [facility]  who presents for advanced heart failure care.  He has a past medical history significant for hyperlipidemia, hypertension, multiple sclerosis (quiescent, no flares since ~), hypothyroidism, obstructive sleep apnea, GERD, obesity with BMI 42 kg/m² maintained on semaglutide.  He has a history of HFrEF and on TTE 3/2024 LVEF 25-30 % LVIDD 5.5 cm, normal right ventricular systolic function.  On cardiac MRI (NOT STRESS EVALUATED), he had LVEF 30%, normal right ventricular systolic function.  Myocardium consistent with NICM, and possible evidence of past myocarditis.  He has a history of having had mild COVIUD May 2019 which did not require specific therapeutics, or hospitalization.  Negative autoimmune screen 2024  CTA coronaries 2024 showed normal coronary circulation and a calcium score of 0.  He was also referred to the sleep medicine team, and is planned to start CPAP.  He was referred to cardiac rehabilitation and has been participating in the sessions.  HF GDMT up titration has been well-tolerated.    NYHA Functional Class: 2  ACC/AHA Stage C heart  failure  Volume status: euvolemic per history  Perfusion status: Mentating well  Aetiology: Non ischemic     PLAN:  #HFrEF  -Plan for TTE, cardiopulmonary exercise test, labs before next visit  -Concern raised that his radio cardiac MRI imaging may have some features consistent with ARVC.  We will arrange for 48-hour ambulatory ECG monitor, and repeat cardiac MRI  -Medication optimization, as detailed below  -Continue cardiac rehabilitation    -Medication optimisation:  BB: Continue  carvedilol to 6.25 mg twice daily  RAASi: Continue sacubitril/valsartan 97/103 mg twice daily  AA: Continue  spironolactone 25 mg once daily  SGLT2i: Continue  dapagliflozin 10 mg once daily    COUNSELING:   We discussed the following non-pharmacological measures during this visit:  ·Smoking and alcohol abstinence/cessation, if applicable.  ·Dietary and medication compliance (in particular, salt restriction)  ·Monitoring daily weights and blood pressures  ·Exercise regimen (walking)    Heart Failure Education Booklet given: 7/1/2024    #Palpitations-resolved  -Ambulatory ECG monitor for 48 hours    #Obesity  -Continue semaglutide  -Referred to the [facility] weight loss team prev    #Obstructive sleep apnea  - Per Sleep medicine team     This note was transcribed using the Dragon Dictation system. There may be grammatical, punctuation, or verbiage errors that can occur with voice recognition programs.    Monique Blevins MD PhD

## 2025-01-10 ENCOUNTER — APPOINTMENT (OUTPATIENT)
Dept: CARDIAC REHAB | Facility: CLINIC | Age: 30
End: 2025-01-10

## 2025-01-11 ENCOUNTER — PHARMACY VISIT (OUTPATIENT)
Dept: PHARMACY | Facility: CLINIC | Age: 30
End: 2025-01-11
Payer: MEDICARE

## 2025-01-13 ENCOUNTER — APPOINTMENT (OUTPATIENT)
Dept: CARDIAC REHAB | Facility: CLINIC | Age: 30
End: 2025-01-13
Payer: COMMERCIAL

## 2025-01-23 ENCOUNTER — HOSPITAL ENCOUNTER (OUTPATIENT)
Dept: CARDIOLOGY | Facility: CLINIC | Age: 30
Discharge: HOME | End: 2025-01-23
Payer: COMMERCIAL

## 2025-01-23 DIAGNOSIS — I50.9 CONGESTIVE HEART FAILURE, UNSPECIFIED HF CHRONICITY, UNSPECIFIED HEART FAILURE TYPE: ICD-10-CM

## 2025-01-23 PROCEDURE — 93225 XTRNL ECG REC<48 HRS REC: CPT

## 2025-02-03 DIAGNOSIS — I50.9 CONGESTIVE HEART FAILURE, UNSPECIFIED HF CHRONICITY, UNSPECIFIED HEART FAILURE TYPE: ICD-10-CM

## 2025-02-04 RX ORDER — SACUBITRIL AND VALSARTAN 97; 103 MG/1; MG/1
1 TABLET, FILM COATED ORAL 2 TIMES DAILY
Qty: 60 TABLET | Refills: 11 | Status: SHIPPED | OUTPATIENT
Start: 2025-02-04

## 2025-02-11 ENCOUNTER — HOSPITAL ENCOUNTER (OUTPATIENT)
Dept: CARDIOLOGY | Facility: CLINIC | Age: 30
Discharge: HOME | End: 2025-02-11
Payer: COMMERCIAL

## 2025-02-11 DIAGNOSIS — I50.9 CONGESTIVE HEART FAILURE, UNSPECIFIED HF CHRONICITY, UNSPECIFIED HEART FAILURE TYPE: ICD-10-CM

## 2025-02-11 LAB
AORTIC VALVE PEAK VELOCITY: 1.24 M/S
AV PEAK GRADIENT: 6 MMHG
AVA (PEAK VEL): 3.61 CM2
EJECTION FRACTION APICAL 4 CHAMBER: 31.4
EJECTION FRACTION: 30 %
LEFT ATRIUM VOLUME AREA LENGTH INDEX BSA: 30.6 ML/M2
LEFT VENTRICLE INTERNAL DIMENSION DIASTOLE: 5.36 CM (ref 3.5–6)
LEFT VENTRICULAR OUTFLOW TRACT DIAMETER: 2.36 CM
MITRAL VALVE E/A RATIO: 1.33
RIGHT VENTRICLE FREE WALL PEAK S': 15 CM/S
RIGHT VENTRICLE PEAK SYSTOLIC PRESSURE: 33.1 MMHG
TRICUSPID ANNULAR PLANE SYSTOLIC EXCURSION: 1.9 CM

## 2025-02-11 PROCEDURE — 93306 TTE W/DOPPLER COMPLETE: CPT

## 2025-02-11 PROCEDURE — 93306 TTE W/DOPPLER COMPLETE: CPT | Performed by: INTERNAL MEDICINE

## 2025-02-17 ENCOUNTER — APPOINTMENT (OUTPATIENT)
Dept: CARDIOLOGY | Facility: CLINIC | Age: 30
End: 2025-02-17
Payer: COMMERCIAL

## 2025-02-18 ENCOUNTER — CLINICAL SUPPORT (OUTPATIENT)
Dept: CARDIAC REHAB | Facility: HOSPITAL | Age: 30
End: 2025-02-18
Payer: COMMERCIAL

## 2025-02-18 VITALS
HEART RATE: 99 BPM | SYSTOLIC BLOOD PRESSURE: 100 MMHG | OXYGEN SATURATION: 99 % | HEIGHT: 72 IN | RESPIRATION RATE: 19 BRPM | DIASTOLIC BLOOD PRESSURE: 64 MMHG | WEIGHT: 309.97 LBS | BODY MASS INDEX: 41.98 KG/M2

## 2025-02-18 DIAGNOSIS — I50.9 CONGESTIVE HEART FAILURE, UNSPECIFIED HF CHRONICITY, UNSPECIFIED HEART FAILURE TYPE: ICD-10-CM

## 2025-02-18 PROCEDURE — 94621 CARDIOPULM EXERCISE TESTING: CPT

## 2025-02-18 PROCEDURE — 94621 CARDIOPULM EXERCISE TESTING: CPT | Performed by: INTERNAL MEDICINE

## 2025-02-18 NOTE — PROGRESS NOTES
Cardiopulmonary (Metabolic) Stress Test    PATIENT: Cirilo Wills  DATE: 2025  AGE/: 29 y.o./1995  REFERRING PHYSICIAN: Monique Blevins MD*    Cardiopulmonary Stress Test was completed today in Cardiopulmonary Stress Lab at [facility]. Correct procedure and correct patient verified verbally and with ID Band checked.    Vitals:    25 1300   BP: 100/64   Pulse: 99   Resp: 19   SpO2: 99%     Vitals:    25 1300   Weight: 141 kg (309 lb 15.5 oz)     Body mass index is 42.03 kg/m².    Please see complete testing results for order in [employer] reporting with final physician interpretation.  Patient has met discharge criteria and has been discharged to home.    Ean Hernadez, CHRIS, CEP, CCT      PROGRESS NOTE    HPI:  Michael Duran is a 81 y.o. male presenting with worsening ankle swelling, currently up to the groin and scrotum, failing outpatient diuretic therapy.  He has history of coronary artery disease, status post bypass surgery several years ago, PCI to left circumflex artery in 2014, and more recently in 5/4/18 for chronic stable angina, inability to tolerate medicines. He received a 2.5 mm resolute Robin drug-eluting stent to left circumflex. He had a preserved LV function, previously, now around 45%.    In 2019, he got diagnosed with the malignant colonic polyp and underwent colectomy.   Somewhere is 2019/2020, he developed atrial flutter with slow ventricular response. He was asymptomatic initially, we still arranged a stress test for chronotropic response initially this was normal. The he slowly became symptomatic with shortness of breath. He was referred to Dr. Reynoso who performed a DC cardioversion, hoping that heart rate would improve after restoration of sinus rhythm. After the cardioversion he came in for an urgent evaluation. He was not feeling well, in fact worse after the cardioversion more short of breath with dizziness. His EKG showed sinus rhythm with prolonged first-degree AV delay occasional PVCs and IVCD. Heart rate is 65 bpm. Subsequently we detected Mobitz type I blockage. At that time given the symptoms of dizziness shortness of breath on exertion we decided to implant a pacemaker. He then started developing some heart failure symptoms. Because of recurrent atrial fibrillation he was started on amiodarone. He was having lot of dizziness/balance problems-after multiple investigations we concluded that his balance problems are due to vertigo. Currently this is slowly improving with physical therapy.  A more recent echo in spring 2022 showed that LVEF has declined to 45%. 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 In  April 2022 it came to our attention on a routine pacemaker check that he had a sustained but slow ventricular tachycardia with a heart rate around 150 bpm. He was not admitted to the hospital with wide-complex tachycardia but turned out this was atrial tachycardia with tracking. Tracking was turned off. He was discharged home. At that point we decided to initiate evaluation for cardiac amyloidosis. Blood work and urine analysis was negative but PYP scan was strongly suggestive of ATTR amyloidosis. However an MRI that was concomitantly ordered by Dr. Stout does not support the diagnosis.  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    Most recently he has had multiple hospital admissions with GI bleed and severe anemia.  Status post multiple transfusions GI evaluation and subsequently oral anticoagulant therapy excepting aspirin was stopped.  He has follow-up with GI as well as with interventional team for Watchman device placement.     He states that shortness of breath is at baseline.  Going regularly to the CHF clinic.  Reviewed lab work that reveals pancytopenia.     His ECG demonstrated atrial ventricular paced rhythm.     Subjective Data:  Patient resting comfortably.  Laying flat in bed without shortness of breath.  His biggest issue is fluid in the abdomen, groin and and legs.  States that he has gained about 20 pounds of water weight.  Is on high-dose IV Lasix at 100 mg twice daily.  Telemetry shows AV paced rhythm.  Patient's hemoglobin has trended down to 6.9 and hemoglobin is 2.92.    12-20-23: Was changed to Lasix infusion by nephrology yesterday.  Patient now starting to diurese very well.  Will get bedside weight today.  Denies shortness of breath.  Telemetry shows AV paced rhythm.    Overnight Events:    None     Objective Data:  Last Recorded Vitals:  Vitals:    12/19/23 2016 12/19/23 2344 12/20/23 0402 12/20/23 0804   BP: 106/65 (!) 97/30 95/57 105/55   BP  Location: Left arm Left arm Left arm Right arm   Patient Position:  Lying Lying Lying   Pulse: 70 73 69 72   Resp: 18 18 18 19   Temp: 36.3 °C (97.3 °F) 36.4 °C (97.5 °F) 36.3 °C (97.3 °F) 36 °C (96.8 °F)   TempSrc: Temporal Temporal Temporal Temporal   SpO2: 98% 97% 95% 95%   Weight:   90.3 kg (199 lb 1.2 oz)    Height:           Last Labs:  CBC - 12/20/2023:  3:29 AM  3.6 6.8 133    21.7      CMP - 12/20/2023:  3:29 AM  8.2 7.2 55 --- 0.7   4.1 3.3 26 138      PTT - 10/26/2023: 12:26 PM  1.2   13.1 32     Last I/O:  I/O last 3 completed shifts:  In: 1005.6 (11.1 mL/kg) [P.O.:990; I.V.:15.6 (0.2 mL/kg)]  Out: 5650 (62.6 mL/kg) [Urine:5050 (1.6 mL/kg/hr); Stool:600]  Weight: 90.3 kg     Past Cardiology Tests (Last 3 Years):  Echo: 4/23--CONCLUSIONS:  1. Left ventricular systolic function is mildly decreased with a 45% estimated ejection fraction.  2. Spectral Doppler shows a restrictive pattern of left ventricular diastolic filling.  3. There is severe concentric left ventricular hypertrophy.  4. There is low normal right ventricular systolic function.  5. The left atrium is severely dilated.  6. The right atrium is moderately to severely dilated.  7. Mild to moderate mitral valve regurgitation.  8. Moderate tricuspid regurgitation.  9. Moderately elevated right ventricular systolic pressure.  10. There is global hypokinesis of the left ventricle with minor regional variations.  No results found for this or any previous visit from the past 1095 days.    Stress Test:  Nuclear Stress Test 06/16/2022    Cardiac Imaging:  MR cardiac morphology and function w and wo IV contrast 10/03/2022      Inpatient Medications:  Scheduled medications   Medication Dose Route Frequency    amiodarone  200 mg oral Daily    [Held by provider] eplerenone  50 mg oral Daily    levothyroxine  25 mcg oral Daily    melatonin  3 mg oral Daily    metOLazone  5 mg oral Daily    pantoprazole  40 mg oral Daily before breakfast    Or    pantoprazole   40 mg intravenous Daily before breakfast    polyethylene glycol  17 g oral Daily    potassium chloride CR  40 mEq oral Once    rosuvastatin  5 mg oral Daily    sucralfate  1 g oral 4x daily    tafamidis  61 mg oral Daily       Review of Systems   Constitutional: Negative for fever and malaise/fatigue.   Cardiovascular:  Positive for leg swelling. Negative for chest pain, orthopnea and palpitations.   Respiratory:  Negative for shortness of breath and wheezing.    Skin:  Negative for itching and rash.   Gastrointestinal:  Negative for abdominal pain, diarrhea, nausea and vomiting.   Genitourinary:  Negative for dysuria.   Neurological:  Negative for weakness.        Physical Exam  Constitutional:       General: He is not in acute distress.  HENT:      Mouth/Throat:      Mouth: Mucous membranes are moist.   Neck:      Comments: Positive HJR  Cardiovascular:      Rate and Rhythm: Normal rate and regular rhythm.      Heart sounds: Normal heart sounds. No murmur heard.  Pulmonary:      Effort: Pulmonary effort is normal.      Breath sounds: Normal breath sounds.      Comments: Few crackles at the left base posteriorly  Abdominal:      General: Abdomen is flat. Bowel sounds are normal.      Palpations: Abdomen is soft.   Musculoskeletal:         General: No swelling.      Right lower leg: Edema present.      Left lower leg: Edema present.      Comments: Some scrotal edema   Skin:     General: Skin is warm and dry.   Neurological:      Mental Status: He is alert and oriented to person, place, and time.   Psychiatric:         Mood and Affect: Mood normal.          ASSESSMENT/PLAN  1-acute on chronic systolic and diastolic heart failure-repeat echo, limited for LV function.  Patient has amyloid cardiomyopathy.  Continue IV diuretics plan on adding metolazone for symptom control and continue metolazone outpatient 2-3 times a week and close outpatient follow-up in CHF clinic for electrolyte monitoring.  12-19-23: Patient  still with concerns of abdominal/groin and lower extremity edema.  Again he had gained 20 pounds prior to his admission.  Continue on high-dose IV Lasix.  If patient not starting to diurese even with the metolazone may need to consider Lasix infusion.  12-20-23: Lasix infusion was initiated by nephrology and the patient is starting to diurese very well.  Will continue Lasix infusion and monitor labs closely.  Creatinine is actually improved to 2.67 today.  He has some low potassium so he will need replacement which is already ordered.     2-atrial fibrillation-maintaining sinus rhythm with amiodarone.  12-19-23: AV paced rhythm on telemetry.  Denies any palpitations.  He is not on anticoagulation due to history of recurrent bleeding.  May need transfused.     12-20-23: Remains AV paced.  No palpitations or other new cardiac complaints.     3-coronary artery disease-stable continue outpatient therapy.  12-19-23: No chest pain or angina.  Blood pressures are well-controlled.  Recommendations--continue conservative management with high-dose Lasix however were not starting to see increase in his diuresis may need Lasix infusion.  As per notes from CHF clinic and the nurse practitioner patient might be nearing the point of palliative care/hospice consideration.  12-20-23:   fortunately no chest pain or angina.  Telemetry shows AV paced rhythm.  Continue Lasix infusion and will monitor labs and ongoing diuresis.        Milton Ricardo PA-C  12/20/2023  9:17 AM

## 2025-02-24 ENCOUNTER — HOSPITAL ENCOUNTER (OUTPATIENT)
Dept: RADIOLOGY | Facility: HOSPITAL | Age: 30
End: 2025-02-24
Payer: COMMERCIAL

## 2025-02-24 DIAGNOSIS — G35 MULTIPLE SCLEROSIS (MULTI): ICD-10-CM

## 2025-02-25 RX ORDER — DIROXIMEL FUMARATE 231 MG/1
2 CAPSULE ORAL 2 TIMES DAILY
Qty: 120 CAPSULE | Refills: 3 | Status: SHIPPED | OUTPATIENT
Start: 2025-02-25

## 2025-03-03 ENCOUNTER — APPOINTMENT (OUTPATIENT)
Dept: CARDIOLOGY | Facility: CLINIC | Age: 30
End: 2025-03-03
Payer: COMMERCIAL

## 2025-03-07 ENCOUNTER — HOSPITAL ENCOUNTER (OUTPATIENT)
Dept: RADIOLOGY | Facility: HOSPITAL | Age: 30
Discharge: HOME | End: 2025-03-07
Payer: COMMERCIAL

## 2025-03-07 VITALS — WEIGHT: 310.85 LBS | BODY MASS INDEX: 42.1 KG/M2 | HEIGHT: 72 IN

## 2025-03-07 DIAGNOSIS — I50.9 CONGESTIVE HEART FAILURE, UNSPECIFIED HF CHRONICITY, UNSPECIFIED HEART FAILURE TYPE: ICD-10-CM

## 2025-03-07 PROCEDURE — A9575 INJ GADOTERATE MEGLUMI 0.1ML: HCPCS | Performed by: STUDENT IN AN ORGANIZED HEALTH CARE EDUCATION/TRAINING PROGRAM

## 2025-03-07 PROCEDURE — 75561 CARDIAC MRI FOR MORPH W/DYE: CPT

## 2025-03-07 PROCEDURE — 2550000001 HC RX 255 CONTRASTS: Performed by: STUDENT IN AN ORGANIZED HEALTH CARE EDUCATION/TRAINING PROGRAM

## 2025-03-07 RX ORDER — GADOTERATE MEGLUMINE 376.9 MG/ML
40 INJECTION INTRAVENOUS
Status: COMPLETED | OUTPATIENT
Start: 2025-03-07 | End: 2025-03-07

## 2025-03-07 RX ORDER — SPIRONOLACTONE 25 MG/1
25 TABLET ORAL DAILY
Qty: 90 TABLET | Refills: 3 | Status: SHIPPED | OUTPATIENT
Start: 2025-03-07

## 2025-03-07 RX ADMIN — GADOTERATE MEGLUMINE 40 ML: 376.9 INJECTION INTRAVENOUS at 13:52

## 2025-03-17 ENCOUNTER — OFFICE VISIT (OUTPATIENT)
Dept: CARDIOLOGY | Facility: CLINIC | Age: 30
End: 2025-03-17
Payer: COMMERCIAL

## 2025-03-17 VITALS
HEART RATE: 91 BPM | OXYGEN SATURATION: 97 % | BODY MASS INDEX: 41.99 KG/M2 | SYSTOLIC BLOOD PRESSURE: 100 MMHG | WEIGHT: 310 LBS | DIASTOLIC BLOOD PRESSURE: 62 MMHG | HEIGHT: 72 IN

## 2025-03-17 DIAGNOSIS — E78.2 COMBINED HYPERLIPIDEMIA: Chronic | ICD-10-CM

## 2025-03-17 DIAGNOSIS — I50.9 CONGESTIVE HEART FAILURE, UNSPECIFIED HF CHRONICITY, UNSPECIFIED HEART FAILURE TYPE: Primary | ICD-10-CM

## 2025-03-17 PROCEDURE — 99215 OFFICE O/P EST HI 40 MIN: CPT | Performed by: STUDENT IN AN ORGANIZED HEALTH CARE EDUCATION/TRAINING PROGRAM

## 2025-03-17 PROCEDURE — 3008F BODY MASS INDEX DOCD: CPT | Performed by: STUDENT IN AN ORGANIZED HEALTH CARE EDUCATION/TRAINING PROGRAM

## 2025-03-17 RX ORDER — ATORVASTATIN CALCIUM 40 MG/1
40 TABLET, FILM COATED ORAL DAILY
Qty: 90 TABLET | Refills: 3 | Status: SHIPPED | OUTPATIENT
Start: 2025-03-17 | End: 2026-03-17

## 2025-03-17 RX ORDER — SPIRONOLACTONE 25 MG/1
25 TABLET ORAL DAILY
Qty: 90 TABLET | Refills: 3 | Status: SHIPPED | OUTPATIENT
Start: 2025-03-17

## 2025-03-17 RX ORDER — DAPAGLIFLOZIN 10 MG/1
10 TABLET, FILM COATED ORAL DAILY
Qty: 30 TABLET | Refills: 11 | Status: SHIPPED | OUTPATIENT
Start: 2025-03-17 | End: 2025-03-20

## 2025-03-17 RX ORDER — CARVEDILOL 6.25 MG/1
6.25 TABLET ORAL
Qty: 180 TABLET | Refills: 3 | Status: SHIPPED | OUTPATIENT
Start: 2025-03-17 | End: 2026-03-17

## 2025-03-17 ASSESSMENT — ENCOUNTER SYMPTOMS
HEMATURIA: 0
DYSPNEA ON EXERTION: 0
NEAR-SYNCOPE: 0
SHORTNESS OF BREATH: 0
WEIGHT LOSS: 0
ORTHOPNEA: 0
HEMATEMESIS: 0
PND: 0
ALTERED MENTAL STATUS: 0
MEMORY LOSS: 0
SYNCOPE: 0
DECREASED APPETITE: 0
HEMATOCHEZIA: 0
OCCASIONAL FEELINGS OF UNSTEADINESS: 0
PARESTHESIAS: 1
PALPITATIONS: 0
BRUISES/BLEEDS EASILY: 0
COUGH: 0
WEIGHT GAIN: 0

## 2025-03-17 NOTE — PATIENT INSTRUCTIONS
Thank you for coming in today. If you have any questions or concerns, you may call the Heart Failure Office at [phone] option [phone], or [phone].  You may also contact our heart failure nursing team via email on [email or web address].    For quicker results set-up your  [employer] account to receive results and other correspondence directly to your phone.    Please bring all your pills/medications to your Cardiology appointments.    **  - No new medication changes today    -We will renew your heart failure medications today  - Please have the following tests done:  Blood tests just before your next visit (RFP, BNP, CBC, iron, TIBC, ferritin)    2.  Echocardiogram  in FIVE MONTHS, we can schedule this for you today before you leave, or you may CALL  [phone]   OR    [phone] to schedule      3.  Cardiopulmonary exercise testing FIVE MONTHS, CALL [phone] to schedule this test    - You will be referred to the following teams, CALL  [phone] to schedule your appointments with:  1. Cardiogenetics ([facility] concern)    - Please make an appointment to be seen in 5 to 6 months

## 2025-03-17 NOTE — PROGRESS NOTES
"Referring Clinician: Dr. Zackery Tabares  Cardiologist: Dr Houston  Patient ID: Cirilo Wills   Primary Care Provider: Miriam eMlchor DO   Visit Type:  Follow Up   Accompanied by: Alone    HPI:     29 y.o. [facility]  who presents for advanced heart failure care.  He has a past medical history significant for hyperlipidemia, hypertension, multiple sclerosis (quiescent, no flares since ~2015), hypothyroidism, obstructive sleep apnea, GERD, obesity with BMI 42 kg/m² maintained on semaglutide- now off consider prescribing clinician no longer with .  He has a history of HFrEF and on TTE 3/2024 LVEF 25-30 % LVIDD 5.5 cm, normal right ventricular systolic function.  On cardiac MRI (NOT STRESS EVALUATED), he had LVEF 30%, normal right ventricular systolic function.  Myocardium consistent with NICM, and possible evidence of past myocarditis.  He has a history of having had mild COVID May 2019 which did not require specific therapeutics, or hospitalization. Negative autoimmune screen 4/2024  CTA coronaries 7/2024 showed normal coronary circulation and a calcium score of 0.  He was also referred to the sleep medicine team,plans for resumption of CPAP.  He was referred to cardiac rehabilitation and has completed participation.  Cardiac MRI 3/2025 showed LVEF 35% with normal RV systolic function with RVEF 47% I also discussed his cardiac MRI with our imaging cardiology specialists, Dr. Martniez, and his main differentials based on cardiac MRI include prior myocarditis or ARVC  CPET 2/2025 was submaximal with VO2 max 21.6 (50%); patient volunteered that he stopped because he thought the test was done but he was able to go further.  He worse an ambulatory ECG monitor for 2 days in Jan 2025 with brief salvos of NSVT.    Functionally he is able to walk up 2 flights of stairs, this is slightly better than his baseline before he started HF GDMT.  He denies any cardiovascular symptoms.  He does describe that he has \"I " "static\" sometimes which he associates with higher blood pressures.    There is no family history of unexplained premature deaths.  His 32-year-old brother had \"a weak heartbeat\" as a child but has not needed cardiovascular follow-up, and has been well since childhood.  No family history of arrhythmias apart from this.  There is no family history of sudden cardiac death  Mr. Wills himself denies palpitations.  He will occasionally have slight \"tightness\" in his chest which he attributes to muscle discomfort and is fully relieved with stretching.  No other cardiovascular symptoms.    He has been doing well with heart failure pharmacotherapy.    He was initiated on PPI, and this helps \"greatly\" no longer with GERD symptoms.    He is fully adherent with all prescribed medications.    He has never been hospitalized with heart failure, or cardiac complaints.    Surgical Hx:  - Nil    Social Hx:  - Smoking- nil  - THC vape pen use since 2022, uses  3-4 times a week  - ETOH- now 1 glass whisky a week, previously used 3-4 glasses of whiskey a week  - Illicit drugs- never   - Lives with brother, and parents.  Feels safe at home    Family Hx:  Specifically, there is no family history of  CAD, heart failure, ICD, PPM, LVAD, OHT, arrhythmias,  or sudden cardiac death.    Brother-\"weak heart beat\" as a child ( now 31 years, appears well)  Cousin- had multiple heart surgeries as a child , now is well ( now  30 years)  Maternal gfather- CVA in his 60s ( smoker\"    Medication reconciliation completed, see below.     Medication Documentation Review Audit       Reviewed by Jocelyn Broderick RN (Registered Nurse) on 03/17/25 at 1422      Medication Order Taking? Sig Documenting Provider Last Dose Status   atorvastatin (Lipitor) 40 mg tablet [id number] Yes Take 1 tablet (40 mg) by mouth once daily. Blessing Fisher MD Taking Active   carvedilol (Coreg) 6.25 mg tablet [id number] Yes Take 1 tablet (6.25 mg) by mouth 2 times daily (morning " and late afternoon). Monique Blevins MD PhD Taking Active   dapagliflozin propanediol (Farxiga) 10 mg [id number] Yes Take 1 tablet (10 mg) by mouth once daily. Monique Blevins MD PhD Taking Active   Entresto  mg tablet [id number] Yes TAKE 1 TABLET BY MOUTH TWICE A DAY Monique Blevins MD PhD  Active   fluticasone (Flonase) 50 mcg/actuation nasal spray [id number] Yes Administer 1 spray into each nostril once daily. Shake gently. Before first use, prime pump. After use, clean tip and replace cap. Jessica Teague MD  Active   pantoprazole (ProtoNix) 20 mg EC tablet [id number] Yes Take 1 tablet (20 mg) by mouth once daily in the morning. Take before meals. Do not crush, chew, or split. Monique Blevins MD PhD Taking Active   resmetirom (Rezdiffra) 100 mg tablet [id number] Yes Take 100 mg by mouth once daily. YEYO Choi-CNP Taking Active   semaglutide, weight loss, (Wegovy) 2.4 mg/0.75 mL pen injector [id number]  Inject 2.4 mg under the skin 1 (one) time per week for 16 doses. Miriam STEVEN Melchor, DO   25 2359   spironolactone (Aldactone) 25 mg tablet [id number] Yes TAKE 1 TABLET BY MOUTH EVERY DAY Monique Blevins MD PhD  Active   Vumerity 231 mg capsule,delayed release(DR/EC) [id number] Yes TAKE 2 CAPSULES BY MOUTH 2 TIMES A DAY. YEYO Barros-CNP  Active                   No Known Allergies        ROS   Negative except for as detailed in HPI    Review of Systems   Constitutional: Negative for decreased appetite, weight gain and weight loss.   HENT:  Negative for hearing loss.    Eyes:  Negative for visual disturbance.   Cardiovascular:  Negative for chest pain, dyspnea on exertion, leg swelling, near-syncope, orthopnea, palpitations, paroxysmal nocturnal dyspnea and syncope.   Respiratory:  Negative for cough and shortness of breath.    Hematologic/Lymphatic: Does not bruise/bleed easily.   Skin:  Negative for rash.   Musculoskeletal:  Negative for joint pain.    Gastrointestinal:  Negative for hematemesis, hematochezia and melena.   Genitourinary:  Negative for hematuria.   Neurological:  Positive for paresthesias.   Psychiatric/Behavioral:  Negative for altered mental status and memory loss.         Investigations:    The electronic medical record has been reviewed by me for salient history. All cardiovascular imaging and testing available in the electronic medical record, and [facility] has been reviewed.    Visit Vitals  /62   Pulse 91   Ht 1.829 m (6')   Wt 141 kg (310 lb)   SpO2 97%   BMI 42.04 kg/m²   Smoking Status Former   BSA 2.68 m²         O/E:  No in person physical examination done  Very pleasant young -American man in no apparent cardiopulmonary distress via video review  AO x 4  Apparent conversational SOB: None  Lab Results   Component Value Date    WBC 6.2 12/17/2024    HGB 14.7 12/17/2024    HCT 43.7 12/17/2024     12/17/2024    CHOL 114 07/16/2024    TRIG 61 07/16/2024    HDL 35.9 07/16/2024    LDLDIRECT 153 (H) 03/18/2024    ALT 31 12/17/2024    AST 19 12/17/2024     10/29/2024    K 4.3 10/29/2024     10/29/2024    CREATININE 0.93 10/29/2024    BUN 13 10/29/2024    CO2 30 10/29/2024    TSH 2.89 10/29/2024    HGBA1C 5.3 06/08/2024        Transthoracic echo (TTE) complete    Result Date: 2/11/2025   [facility], [address] [place] [address], [address] [address]                  Tel [phone] and Fax [phone] TRANSTHORACIC ECHOCARDIOGRAM REPORT  Patient Name:       ARTURO Zamora Physician:    [id number] Lcuia Houston MD Study Date:         2/11/2025           Ordering Provider:    [id number] RANDI RITTER MRN/PID:            [id number]            Fellow: Accession#:         [id number]        Nurse: Date of Birth/Age:  1995 / 29      Sonographer:          Deya Herring [facility]                      years Gender assigned at  M                   Additional Staff: Birth: Height:             182.88 cm           Admit Date: Weight:             136.53 kg           Admission Status:     Outpatient BSA / BMI:          2.53 m2 / 40.82     Encounter#:           [id number]                     kg/m2 Blood Pressure:     111/80 mmHg         Department Location:  [facility] Study Type:    TRANSTHORACIC ECHO (TTE) COMPLETE Diagnosis/ICD: Heart failure, unspecified-I50.9 Indication:    Congestive Heart Failure CPT Code:      Echo Complete w Full Doppler-[id number] Patient History: Diabetes:          Yes BMI:               Obese >30 Pertinent History: CHF, Cardiomyopathy and Dyspnea. KG,SEBASTIAN. Study Detail: The following Echo studies were performed: 2D, M-Mode, Doppler and               color flow. Technically challenging study due to body habitus.  PHYSICIAN INTERPRETATION: Left Ventricle: Left ventricular ejection fraction is moderately decreased, by visual estimate at 30%. There are no regional left ventricular wall motion abnormalities. The left ventricular cavity size is normal. There is normal septal and normal posterior left ventricular wall thickness. Spectral Doppler shows a normal pattern of left ventricular diastolic filling. Left Atrium: The left atrial size is moderately dilated. Possible laminated apical thrombus. Right Ventricle: The right ventricle is normal in size. There is normal right ventricular global systolic function. Right Atrium: The right atrium is mildly dilated. Aortic Valve: The aortic valve is trileaflet. There is no evidence of aortic valve regurgitation. The peak instantaneous gradient of the aortic valve is 6 mmHg. Mitral Valve: The mitral valve is normal in structure. There is trace mitral valve regurgitation. Tricuspid Valve: The tricuspid valve is structurally normal. No evidence of tricuspid regurgitation. The Doppler estimated RVSP is mildly elevated at 33.1 mmHg. Pulmonic Valve:  The pulmonic valve is not well visualized. There is physiologic pulmonic valve regurgitation. Pericardium: There is no pericardial effusion noted. Aorta: The aortic root is normal.  CONCLUSIONS:  1. Left ventricular ejection fraction is moderately decreased, by visual estimate at 30%.  2. There is normal right ventricular global systolic function.  3. The left atrial size is moderately dilated.  4. Possible laminated apical thrombus.  5. Mildly elevated right ventricular systolic pressure. QUANTITATIVE DATA SUMMARY:  2D MEASUREMENTS:          Normal Ranges: LAs:             5.05 cm  (2.7-4.0cm) IVSd:            0.82 cm  (0.6-1.1cm) LVPWd:           0.93 cm  (0.6-1.1cm) LVIDd:           5.36 cm  (3.9-5.9cm) LVIDs:           4.79 cm LV Mass Index:   68 g/m2 LVEDV Index:     69 ml/m2 LV % FS          10.5 %  LEFT ATRIUM:                  Normal Ranges: LA Vol A4C:        67.4 ml    (22+/-6mL/m2) LA Vol A2C:        77.6 ml LA Vol BP:         77.6 ml LA Vol Index A4C:  26.6 ml/m2 LA Vol Index A2C:  30.6 ml/m2 LA Vol Index BP:   30.6 ml/m2 LA Area A4C:       22.0 cm2 LA Area A2C:       22.0 cm2 LA Major Axis A4C: 6.1 cm LA Major Axis A2C: 5.3 cm LA Volume Index:   31.0 ml/m2 LA Vol A4C:        62.2 ml LA Vol A2C:        73.5 ml LA Vol Index BSA:  26.8 ml/m2  RIGHT ATRIUM:                 Normal Ranges: RA Vol A4C:        49.2 ml    (8.3-19.5ml) RA Vol Index A4C:  19.4 ml/m2 RA Area A4C:       18.0 cm2 RA Major Axis A4C: 5.6 cm  M-MODE MEASUREMENTS:         Normal Ranges: Ao Root:             3.50 cm (2.0-3.7cm) LAs:                 5.00 cm (2.7-4.0cm)  AORTA MEASUREMENTS:         Normal Ranges: Asc Ao, d:          3.00 cm (2.1-3.4cm) Ao Arch:            2.40 cm (2.0-3.6cm)  LV SYSTOLIC FUNCTION:                      Normal Ranges: EF-A4C View:    31 % (>=55%) EF-A2C View:    34 % EF-Biplane:     33 % EF-Visual:      30 % LV EF Reported: 30 %  LV DIASTOLIC FUNCTION:             Normal Ranges: MV Peak E:             1.01  m/s    (0.7-1.2 m/s) MV Peak A:             0.76 m/s    (0.42-0.7 m/s) E/A Ratio:             1.33        (1.0-2.2) MV e'                  0.147 m/s   (>8.0) MV lateral e'          0.17 m/s MV medial e'           0.13 m/s MV A Dur:              112.27 msec E/e' Ratio:            6.90        (<8.0) PulmV Sys Coleman:         55.52 cm/s PulmV [place] Coleman:        39.54 cm/s PulmV S/D Coleman:         1.40 PulmV A Revs Coleman:      24.60 cm/s PulmV A Revs Dur:      100.86 msec  MITRAL VALVE:          Normal Ranges: MV DT:        130 msec (150-240msec)  AORTIC VALVE:           Normal Ranges: AoV Vmax:      1.24 m/s (<=1.7m/s) AoV Peak P.1 mmHg (<20mmHg) LVOT Max Coleman:  1.03 m/s (<=1.1m/s) LVOT VTI:      18.02 cm LVOT Diameter: 2.36 cm  (1.8-2.4cm) AoV Area,Vmax: 3.61 cm2 (2.5-4.5cm2)  RIGHT VENTRICLE: RV Basal 3.40 cm RV Mid   2.20 cm RV Major 7.2 cm TAPSE:   18.6 mm RV s'    0.15 m/s  TRICUSPID VALVE/RVSP:          Normal Ranges: Peak TR Velocity:     2.74 m/s RV Syst Pressure:     33 mmHg  (< 30mmHg) IVC Diam:             1.90 cm  PULMONIC VALVE:          Normal Ranges: PV Accel Time:  74 msec  (>120ms) PV Max Coleman:     1.1 m/s  (0.6-0.9m/s) PV Max P.8 mmHg  PULMONARY VEINS: PulmV A Revs Dur: 100.86 msec PulmV A Revs Coleman: 24.60 cm/s PulmV Ocampo Coleman:   39.54 cm/s PulmV S/D Coleman:    1.40 PulmV Sys Coleman:    55.52 cm/s  [id number] Lucia Houston MD Electronically signed on 2025 at 9:00:31 AM  ** Final **     Transthoracic Echo (TTE) Complete    Result Date: 3/28/2024   [facility], [address] [place] [address], [address], [place], [place] [address]                  Tel [phone] and Fax [phone] TRANSTHORACIC ECHOCARDIOGRAM REPORT  Patient Name:      ARTURO Zamora Physician:    [id number] Lucia Houston MD Study Date:        3/27/2024            Ordering Provider:    [id number] PETER FUNK MRN/PID:           [id number]             Fellow: Accession#:         [id number]         Nurse:                Juanita Oneill RN Date of Birth/Age: 1995 / 28 years Sonographer:          Italo Bullock                                                               RDASIF Gender:            M                    Additional Staff: Height:            185.42 cm            Admit Date: Weight:            150.59 kg            Admission Status:     Outpatient BSA / BMI:         2.67 m2 / 43.80      Encounter#:           [id number]                    kg/m2                                         Department Location:  [facility] Blood Pressure: 134 /81 mmHg Study Type:    TRANSTHORACIC ECHO (TTE) COMPLETE Diagnosis/ICD: Other forms of dyspnea-R06.09 Indication:    JIMÉNEZ CPT Code:      Echo Complete w Full Doppler-[id number] Patient History: Pertinent History: Abnormal EKG, JIMÉNEZ, HLD, DM, Hypothyroidism, KG, Family hx of                    heart disease. Study Detail: The following Echo studies were performed: 2D, M-Mode, Doppler and               color flow. Technically challenging study due to prominent lung               artifact and body habitus. Definity used as a contrast agent for               endocardial border definition. Total contrast used for this               procedure was 3.0 mL via IV push.  PHYSICIAN INTERPRETATION: Left Ventricle: The left ventricular systolic function is severely decreased, with an estimated ejection fraction of 25-30%. There are no regional wall motion abnormalities. The left ventricular cavity size is normal. Spectral Doppler shows an impaired relaxation pattern of left ventricular diastolic filling. Left Atrium: The left atrium is mildly dilated. Right Ventricle: The right ventricle is normal in size. There is normal right ventricular global systolic function. Right Atrium: The right atrium is normal in size. Aortic Valve: The aortic valve was not well visualized. There is no evidence of aortic valve regurgitation. The peak instantaneous gradient of the  aortic valve is 6.0 mmHg. The mean gradient of the aortic valve is 3.8 mmHg. Mitral Valve: The mitral valve is normal in structure. There is trace mitral valve regurgitation. Tricuspid Valve: The tricuspid valve is structurally normal. There is trace to mild tricuspid regurgitation. Pulmonic Valve: The pulmonic valve is not well visualized. There is no indication of pulmonic valve regurgitation. Pericardium: There is no pericardial effusion noted. Aorta: The aortic root is normal.  CONCLUSIONS:  1. Left ventricular systolic function is severely decreased with a 25-30% estimated ejection fraction.  2. Spectral Doppler shows an impaired relaxation pattern of left ventricular diastolic filling. QUANTITATIVE DATA SUMMARY: 2D MEASUREMENTS:                          Normal Ranges: LAs:           4.41 cm   (2.7-4.0cm) RVIDd:         2.73 cm   (0.9-3.6cm) IVSd:          1.09 cm   (0.6-1.1cm) LVPWd:         0.90 cm   (0.6-1.1cm) LVIDd:         5.55 cm   (3.9-5.9cm) LVIDs:         4.36 cm LV Mass Index: 80.5 g/m2 LV % FS        21.4 % LA VOLUME:                               Normal Ranges: LA Vol A4C:        91.1 ml    (22+/-6mL/m2) LA Vol A2C:        109.8 ml LA Vol BP:         102.4 ml LA Vol Index A4C:  34.1 ml/m2 LA Vol Index A2C:  41.1 ml/m2 LA Vol Index BP:   38.4 ml/m2 LA Area A4C:       26.4 cm2 LA Area A2C:       28.3 cm2 LA Major Axis A4C: 6.5 cm LA Major Axis A2C: 6.2 cm LA Vol A4C:        79.9 ml LA Vol A2C:        104.7 ml RA VOLUME BY A/L METHOD:                               Normal Ranges: RA Vol A4C:        60.0 ml    (8.3-19.5ml) RA Vol Index A4C:  22.5 ml/m2 RA Area A4C:       18.4 cm2 RA Major Axis A4C: 4.8 cm AORTA MEASUREMENTS:                    Normal Ranges: Asc Ao, d: 3.00 cm (2.1-3.4cm) LV SYSTOLIC FUNCTION BY 2D PLANIMETRY (MOD):                     Normal Ranges: EF-A4C View: 29.1 % (>=55%) EF-A2C View: 30.0 % EF-Biplane:  29.6 % LV DIASTOLIC FUNCTION:                            Normal Ranges: MV  Peak E:      0.98 m/s   (0.7-1.2 m/s) MV Peak A:      0.66 m/s   (0.42-0.7 m/s) E/A Ratio:      1.50       (1.0-2.2) MV e'           0.11 m/s   (>8.0) MV lateral e'   0.13 m/s MV medial e'    0.08 m/s E/e' Ratio:     8.95       (<8.0) PulmV Sys Coleman:  55.09 cm/s PulmV Ocampo Coleman: 46.63 cm/s PulmV S/D Coleman:  1.18 MITRAL VALVE:                 Normal Ranges: MV DT: 104 msec (150-240msec) AORTIC VALVE:                                   Normal Ranges: AoV Vmax:                1.22 m/s (<=1.7m/s) AoV Peak P.0 mmHg (<20mmHg) AoV Mean PG:             3.8 mmHg (1.7-11.5mmHg) LVOT Max Coleman:            0.70 m/s (<=1.1m/s) AoV VTI:                 21.14 cm (18-25cm) LVOT VTI:                11.92 cm LVOT Diameter:           2.01 cm  (1.8-2.4cm) AoV Area, VTI:           1.79 cm2 (2.5-5.5cm2) AoV Area,Vmax:           1.82 cm2 (2.5-4.5cm2) AoV Dimensionless Index: 0.56  RIGHT VENTRICLE: RV Basal 3.90 cm RV Mid   2.70 cm RV Major 8.1 cm TAPSE:   16.9 mm RV s'    0.18 m/s PULMONIC VALVE:                      Normal Ranges: PV Max Coleman: 1.1 m/s  (0.6-0.9m/s) PV Max P.4 mmHg Pulmonary Veins: PulmV Ocampo Coleman: 46.63 cm/s PulmV S/D Coleman:  1.18 PulmV Sys Coleman:  55.09 cm/s  [id number] Lucia Houston MD Electronically signed on 3/28/2024 at 7:09:31 PM  ** Final (Updated) **        CTA cors 2024  1.  Normal coronary anatomy without evidence of atherosclerotic  changes or stenotic disease.  2. Coronary artery calcium score is 0.    Cardiac MRI 3/2025 showed LVEF  1. The left ventricle is dilated (LVEDVi = 112 mL/m2) with moderately  reduced systolic function. LVEF = 35%. No focal wall motion  abnormalities on a background of moderate global hypokinesis.  2. Normal RV size with low-normal to mildly reduced systolic  function. RVEF = 47%.  3. Areas of mid myocardial delayed enhancement involving the mid to  apical interventricular septum as well as the mid to basal  inferior/inferolateral wall which are consistent with a  "nonischemic  etiology. No evidence of prior infarction. Approximate scar size =  13.5%.  4. Trace mitral regurgitation (regurgitant fraction = 4.7%). Normal  aortic valve.  I also discussed his cardiac MRI with our imaging cardiology specialists, Dr. Martinez,\" he still has a prominent ring of subepicardial LGE with a 'bull's eye' appearance. Main differentials include prior myocarditis or arrhythmogenic CM. The genetic testing will be useful to assess for specific mutations that can cause the latter, but if negative the culprit of his CM may have been a prior episode of myocarditis. \"    CPET 2/2025     1. Peak oxygen consumption 21.6 ml/kg/min (50% predicted) or 38.4 corrected for lean body mass.   2. Normal BP response, no EOV noted, VE/vco2 slope normal (23), low normal VO2/work rate (8.5).   3. EKG showed NSR with rare PVCs, test non-diagnostic for ischemia due to failure to reach THR.   4. Test consistent with moderate cardiac limitation to exercise, peripheral variables are reassuring.   5. No indication of pulmonary limitation to exercise.    IMPRESSION:      29 y.o. [facility]  who presents for advanced heart failure care.  He has a past medical history significant for hyperlipidemia, hypertension, multiple sclerosis (quiescent, no flares since ~2015), hypothyroidism, obstructive sleep apnea, GERD, obesity with BMI 42 kg/m² maintained on semaglutide.  He has a history of HFrEF and on TTE 3/2024 LVEF 25-30 % LVIDD 5.5 cm, normal right ventricular systolic function.  On cardiac MRI (NOT STRESS EVALUATED), he had LVEF 30%, normal right ventricular systolic function.  Myocardium consistent with NICM, and possible evidence of past myocarditis.  He has a history of having had mild COVIUD May 2019 which did not require specific therapeutics, or hospitalization.  Negative autoimmune screen 4/2024  CTA coronaries 7/2024 showed normal coronary circulation and a calcium score of 0.  Cardiac MRI 3/2025 showed " LVEF 35% with normal RV systolic function with RVEF 47% I also discussed his cardiac MRI with our imaging cardiology specialists, Dr. Martinez, and his main differentials based on cardiac MRI include prior myocarditis or ARVC  CPET 2/2025 was submaximal with VO2 max 21.6 (50%); patient volunteered that he stopped because he thought the test was done but he was able to go further.  He worse an ambulatory ECG monitor for 2 days in Jan 2025 with brief salvos of NSVT.    NYHA Functional Class: 2  ACC/AHA Stage C heart failure  Volume status: euvolemic   Perfusion status: Warm and well-perfused l  Aetiology: Non ischemic     PLAN:  #HFrEF  -Plan for TTE, cardiopulmonary exercise test, labs before next visit  -Concern raised that his cardiac MRI imaging may have some features consistent with ARVC.  We will refer him for genetic testing  -Medication optimization, as detailed below      -Medication optimisation:  BB: Continue  carvedilol to 6.25 mg twice daily  RAASi: Continue sacubitril/valsartan 97/103 mg twice daily  AA: Continue  spironolactone 25 mg once daily  SGLT2i: Continue  dapagliflozin 10 mg once daily    COUNSELING:   We discussed the following non-pharmacological measures during this visit:  ·Smoking and alcohol abstinence/cessation, if applicable.  ·Dietary and medication compliance (in particular, salt restriction)  ·Monitoring daily weights and blood pressures  ·Exercise regimen (walking)    Heart Failure Education Booklet given: 7/1/2024    #Palpitations-resolved  -Ambulatory ECG monitor for 48 hours    #Obesity  -Continue semaglutide, working to find a new provider for this  -Referred to the [facility] weight loss team prev    #Obstructive sleep apnea  - Per Sleep medicine team     This note was transcribed using the Dragon Dictation system. There may be grammatical, punctuation, or verbiage errors that can occur with voice recognition programs.    Monique Blevins MD PhD

## 2025-03-18 ENCOUNTER — TELEPHONE (OUTPATIENT)
Dept: CARDIOLOGY | Facility: HOSPITAL | Age: 30
End: 2025-03-18
Payer: COMMERCIAL

## 2025-03-18 DIAGNOSIS — I50.9 CONGESTIVE HEART FAILURE, UNSPECIFIED HF CHRONICITY, UNSPECIFIED HEART FAILURE TYPE: Primary | ICD-10-CM

## 2025-03-18 LAB
ALBUMIN SERPL-MCNC: 4.7 G/DL (ref 3.6–5.1)
BNP SERPL-MCNC: 22 PG/ML
BUN SERPL-MCNC: 13 MG/DL (ref 7–25)
BUN/CREAT SERPL: NORMAL (CALC) (ref 6–22)
CALCIUM SERPL-MCNC: 9.9 MG/DL (ref 8.6–10.3)
CHLORIDE SERPL-SCNC: 105 MMOL/L (ref 98–110)
CO2 SERPL-SCNC: 28 MMOL/L (ref 20–32)
CREAT SERPL-MCNC: 0.88 MG/DL (ref 0.6–1.26)
EGFRCR SERPLBLD CKD-EPI 2021: 118 ML/MIN/1.73M2
ERYTHROCYTE [DISTWIDTH] IN BLOOD BY AUTOMATED COUNT: 13.3 % (ref 11–15)
FERRITIN SERPL-MCNC: 472 NG/ML (ref 38–380)
GLUCOSE SERPL-MCNC: 97 MG/DL (ref 65–139)
HCT VFR BLD AUTO: 43.5 % (ref 38.5–50)
HGB BLD-MCNC: 14.6 G/DL (ref 13.2–17.1)
IRON SATN MFR SERPL: 19 % (CALC) (ref 20–48)
IRON SERPL-MCNC: 67 MCG/DL (ref 50–180)
MCH RBC QN AUTO: 31.5 PG (ref 27–33)
MCHC RBC AUTO-ENTMCNC: 33.6 G/DL (ref 32–36)
MCV RBC AUTO: 94 FL (ref 80–100)
PHOSPHATE SERPL-MCNC: 4.4 MG/DL (ref 2.5–4.5)
PLATELET # BLD AUTO: 255 THOUSAND/UL (ref 140–400)
PMV BLD REES-ECKER: 11.3 FL (ref 7.5–12.5)
POTASSIUM SERPL-SCNC: 4.5 MMOL/L (ref 3.5–5.3)
RBC # BLD AUTO: 4.63 MILLION/UL (ref 4.2–5.8)
SODIUM SERPL-SCNC: 142 MMOL/L (ref 135–146)
TIBC SERPL-MCNC: 347 MCG/DL (CALC) (ref 250–425)
WBC # BLD AUTO: 8 THOUSAND/UL (ref 3.8–10.8)

## 2025-03-20 DIAGNOSIS — I50.9 CONGESTIVE HEART FAILURE, UNSPECIFIED HF CHRONICITY, UNSPECIFIED HEART FAILURE TYPE: Primary | ICD-10-CM

## 2025-03-28 ENCOUNTER — CLINICAL SUPPORT (OUTPATIENT)
Dept: CARDIAC REHAB | Facility: HOSPITAL | Age: 30
End: 2025-03-28
Payer: COMMERCIAL

## 2025-03-28 VITALS
WEIGHT: 310.85 LBS | RESPIRATION RATE: 9 BRPM | OXYGEN SATURATION: 98 % | DIASTOLIC BLOOD PRESSURE: 64 MMHG | BODY MASS INDEX: 42.1 KG/M2 | SYSTOLIC BLOOD PRESSURE: 100 MMHG | HEIGHT: 72 IN | HEART RATE: 89 BPM

## 2025-03-28 DIAGNOSIS — I50.9 CONGESTIVE HEART FAILURE, UNSPECIFIED HF CHRONICITY, UNSPECIFIED HEART FAILURE TYPE: ICD-10-CM

## 2025-03-28 PROCEDURE — 94621 CARDIOPULM EXERCISE TESTING: CPT

## 2025-03-28 NOTE — PROGRESS NOTES
Cardiopulmonary (Metabolic) Stress Test    PATIENT: Cirilo Wills  DATE: 3/28/2025  AGE/: 29 y.o./1995  REFERRING PHYSICIAN: Monique Blevins MD*    Cardiopulmonary Stress Test was completed today in Cardiopulmonary Stress Lab at [facility]. Correct procedure and correct patient verified verbally and with ID Band checked.    Vitals:    25 1300   BP: 100/64   Pulse: 89   Resp: (!) 9   SpO2: 98%     Vitals:    25 1300   Weight: 141 kg (310 lb 13.6 oz)     Body mass index is 42.15 kg/m².    Please see complete testing results for order in [employer] reporting with final physician interpretation.  Patient has met discharge criteria and has been discharged to home.    CHRIS Rodriguez, CEP, CCT   
[FreeTextEntry1] : Mr. Goddard presents for annual physical examination.\par \par 1.  He will continue on current medication regimen which has been reviewed and revised.\par \par 2.  Prescription for CBC, CMP, hemoglobin A1c, iron level, lipid profile, TSH level and urinalysis.\par \par 3.  The patient will be started on Augmentin 875 mg p.o. twice daily for cellulitis of the left forearm secondary to a dog bite.  If there is no significant improvement with the patient develops any purulent drainage or streaking of the arms he will go to the emergency room for evaluation and treatment.\par \par 4.  Patient has a history of obstructive sleep apnea.  He is currently on AutoPap therapy however his AutoPap machine has been recalled by [employer].\par \par 5.  Patient has a history of testicular carcinoma status post left orchiectomy.  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  Follow-up in 6 months with complete pulmonary function test.

## 2025-04-18 ENCOUNTER — TELEMEDICINE CLINICAL SUPPORT (OUTPATIENT)
Dept: NUTRITION | Facility: HOSPITAL | Age: 30
End: 2025-04-18
Payer: COMMERCIAL

## 2025-04-18 ENCOUNTER — HOSPITAL ENCOUNTER (OUTPATIENT)
Dept: RADIOLOGY | Facility: CLINIC | Age: 30
Discharge: HOME | End: 2025-04-18
Payer: COMMERCIAL

## 2025-04-18 VITALS — BODY MASS INDEX: 42.16 KG/M2 | HEIGHT: 72 IN

## 2025-04-18 DIAGNOSIS — I50.9 CONGESTIVE HEART FAILURE, UNSPECIFIED HF CHRONICITY, UNSPECIFIED HEART FAILURE TYPE: Primary | ICD-10-CM

## 2025-04-18 DIAGNOSIS — G35 MULTIPLE SCLEROSIS (MULTI): ICD-10-CM

## 2025-04-18 PROCEDURE — 70553 MRI BRAIN STEM W/O & W/DYE: CPT | Performed by: RADIOLOGY

## 2025-04-18 PROCEDURE — 97802 MEDICAL NUTRITION INDIV IN: CPT | Mod: 95 | Performed by: DIETITIAN, REGISTERED

## 2025-04-18 PROCEDURE — 70553 MRI BRAIN STEM W/O & W/DYE: CPT

## 2025-04-18 PROCEDURE — A9575 INJ GADOTERATE MEGLUMI 0.1ML: HCPCS | Mod: JZ | Performed by: NURSE PRACTITIONER

## 2025-04-18 PROCEDURE — 2550000001 HC RX 255 CONTRASTS: Mod: JZ | Performed by: NURSE PRACTITIONER

## 2025-04-18 RX ORDER — GADOTERATE MEGLUMINE 376.9 MG/ML
20 INJECTION INTRAVENOUS
Status: COMPLETED | OUTPATIENT
Start: 2025-04-18 | End: 2025-04-18

## 2025-04-18 RX ADMIN — GADOTERATE MEGLUMINE 20 ML: 376.9 INJECTION INTRAVENOUS at 09:56

## 2025-04-18 NOTE — PATIENT INSTRUCTIONS
Increase dietary Iron by increasing your intake of lean red meat, beans, fortified cereal- see attached handout for a complete list.  Consider taking Ferrous Sulfate- 45mg per day is the tolerable upper intake for adults.  Consuming vitamin C with iron can help with absorption.    Avoid taking with tea or coffee as this can block absorption.

## 2025-04-18 NOTE — PROGRESS NOTES
Nutrition Assessment     Reason for Visit:  Cirilo Wills is a 29 y.o. male who presents for No chief complaint on file.    Anthropometrics:  Wt Readings from Last 10 Encounters:   03/28/25 141 kg (310 lb 13.6 oz)   03/17/25 141 kg (310 lb)   03/07/25 141 kg (310 lb 13.6 oz)   02/18/25 141 kg (309 lb 15.5 oz)   12/16/24 137 kg (301 lb)   12/14/24 135 kg (298 lb 9.6 oz)   11/19/24 139 kg (305 lb 12.8 oz)   10/29/24 141 kg (311 lb 11.2 oz)   10/14/24 140 kg (309 lb 9.6 oz)   09/26/24 139 kg (307 lb)     Lab Results   Component Value Date    HGBA1C 5.3 06/08/2024     Anthropometrics  Height: 182.9 cm (6')  Meds: Wegovy?    Food And Nutrient Intake:  Food and Nutrient History  Food and Nutrient History: Pt presents for nutrition counseling in the context of anemia.  Pt has a complex medical history which includes MS, recent HF, and SEBASTIAN.  BMI indicates class III obesity.  Pt was previously taking Wegovy but his weight loss hit plateau and he stopped the medication.  Today, pt is interested in finding strategies to increase his Fe+ as he often feels fatigued.  Provided pt with high Fe+ food options as well as information regarding a supplement.  Encouraged Fe+ be taken with a source of vitamin C.   Food Intake  Meal 1: breakfast: burrtio  Meal 2: lunch: ramen,  Snacks : chips, gummy worms  Meal 3: dinner: takeout once per week; meat; steamer bags, salad  Fluid Intake: water, flavored packet, energy drink     Micronutrient Intake  Vitamin Intake: D    Food Supplement Intake  Vitamin Intake: D       Physical Activities:  Physical Activity  Type of Physical Activity: not currently     Nutrition Focused Physical Exam:  Deferred- telehealth visit     Energy Needs  Calculated Energy Needs Using Equations  Height: 182.9 cm (6')  Weight Used for Equation Calculations: 141 kg (310 lb)  Whitley- [place] Sa[place]ra Equation (Overweight or Obese Patients): 2399  Activity Factor: 1.2  Total Energy Needs: 2879  Estimated Energy  Needs  Total Energy Estimated Needs in 24 hours (kCal): 2378 kCal  Method for Estimating Needs: MSJ x 1.2-500 kcals    Nutrition Diagnosis   Malnutrition Diagnosis  Patient has Malnutrition Diagnosis: No  Nutrition Diagnosis  Patient has Nutrition Diagnosis: Yes  Diagnosis Status (1): New  Nutrition Diagnosis 1: Altered nutrition related to laboratory values  Related to (1): etiology unknown  As Evidenced by (1): low FE+    Nutrition Interventions/Recommendations   Nutrition Prescription  Individualized Nutrition Prescription Provided for : Oral nutrition  Individualized Nutrition Prescription Provided for : HIgh Fe+ diet; low sodium diet <2000mg/dl  Individualized Nutrition Prescription Provided for : HIgh Fe+ diet; low sodium diet <2000mg/dl  Food and Nutrition Delivery  Meals & Snacks: Mineral-modified diet, General Healthful Diet  Nutrition Counseling  Theoretical Basis/Approach: Nutrition counseling based on health belief model  Nutrition Counseling Strategies : Nutrition counseling based on goal setting strategy      Patient Instructions   Increase dietary Iron by increasing your intake of lean red meat, beans, fortified cereal- see attached handout for a complete list.  Consider taking Ferrous Sulfate- 45mg per day is the tolerable upper intake for adults.  Consuming vitamin C with iron can help with absorption.    Avoid taking with tea or coffee as this can block absorption.      Nutrition Monitoring and Evaluation   Food and Nutrient Intake  Monitoring and Evaluation Plan: Micronutrient intake determination  Micronutrient Intake Determination: Multimineral estimated intake in 24 hours  Biochemical Data, Medical Tests and Procedures  Monitoring and Evaluation Plan: Nutritional anemia profile  Criteria: % saturation - wnl's    Readiness to Change : Good  Level of Understanding : Good  Anticipated Compliant : Good

## 2025-05-19 DIAGNOSIS — K75.81 NASH (NONALCOHOLIC STEATOHEPATITIS): ICD-10-CM

## 2025-05-20 ENCOUNTER — OFFICE VISIT (OUTPATIENT)
Dept: SLEEP MEDICINE | Facility: CLINIC | Age: 30
End: 2025-05-20
Payer: COMMERCIAL

## 2025-05-20 VITALS
WEIGHT: 315 LBS | SYSTOLIC BLOOD PRESSURE: 110 MMHG | HEART RATE: 75 BPM | OXYGEN SATURATION: 92 % | HEIGHT: 72 IN | DIASTOLIC BLOOD PRESSURE: 73 MMHG | BODY MASS INDEX: 42.66 KG/M2

## 2025-05-20 DIAGNOSIS — G47.33 OSA (OBSTRUCTIVE SLEEP APNEA): Primary | ICD-10-CM

## 2025-05-20 PROCEDURE — 1036F TOBACCO NON-USER: CPT | Performed by: NURSE PRACTITIONER

## 2025-05-20 PROCEDURE — 3008F BODY MASS INDEX DOCD: CPT | Performed by: NURSE PRACTITIONER

## 2025-05-20 PROCEDURE — 99214 OFFICE O/P EST MOD 30 MIN: CPT | Performed by: NURSE PRACTITIONER

## 2025-05-20 RX ORDER — RESMETIROM 100 MG/1
1 TABLET, COATED ORAL DAILY
Qty: 30 TABLET | Refills: 11 | Status: SHIPPED | OUTPATIENT
Start: 2025-05-20

## 2025-05-20 ASSESSMENT — SLEEP AND FATIGUE QUESTIONNAIRES
HOW LIKELY ARE YOU TO NOD OFF OR FALL ASLEEP WHILE SITTING AND READING: SLIGHT CHANCE OF DOZING
HOW LIKELY ARE YOU TO NOD OFF OR FALL ASLEEP WHEN YOU ARE A PASSENGER IN A CAR FOR AN HOUR WITHOUT A BREAK: WOULD NEVER DOZE
HOW LIKELY ARE YOU TO NOD OFF OR FALL ASLEEP IN A CAR, WHILE STOPPED FOR A FEW MINUTES IN TRAFFIC: WOULD NEVER DOZE
HOW LIKELY ARE YOU TO NOD OFF OR FALL ASLEEP WHILE SITTING AND TALKING TO SOMEONE: WOULD NEVER DOZE
SATISFACTION_WITH_CURRENT_SLEEP_PATTERN: SATISFIED
HOW LIKELY ARE YOU TO NOD OFF OR FALL ASLEEP WHILE WATCHING TV: SLIGHT CHANCE OF DOZING
HOW LIKELY ARE YOU TO NOD OFF OR FALL ASLEEP WHILE LYING DOWN TO REST IN THE AFTERNOON WHEN CIRCUMSTANCES PERMIT: MODERATE CHANCE OF DOZING
WAKING_TOO_EARLY: MILD
SLEEP_PROBLEM_INTERFERES_DAILY_ACTIVITIES: A LITTLE
ESS-CHAD TOTAL SCORE: 5
SLEEP_PROBLEM_NOTICEABLE_TO_OTHERS: A LITTLE
HOW LIKELY ARE YOU TO NOD OFF OR FALL ASLEEP WHILE SITTING QUIETLY AFTER LUNCH WITHOUT ALCOHOL: WOULD NEVER DOZE
SITING INACTIVE IN A PUBLIC PLACE LIKE A CLASS ROOM OR A MOVIE THEATER: SLIGHT CHANCE OF DOZING
WORRIED_DISTRESSED_DUE_TO_SLEEP: NOT AT ALL NOTICEABLE

## 2025-05-20 ASSESSMENT — PATIENT HEALTH QUESTIONNAIRE - PHQ9
2. FEELING DOWN, DEPRESSED OR HOPELESS: NOT AT ALL
1. LITTLE INTEREST OR PLEASURE IN DOING THINGS: SEVERAL DAYS
10. IF YOU CHECKED OFF ANY PROBLEMS, HOW DIFFICULT HAVE THESE PROBLEMS MADE IT FOR YOU TO DO YOUR WORK, TAKE CARE OF THINGS AT HOME, OR GET ALONG WITH OTHER PEOPLE: SOMEWHAT DIFFICULT
SUM OF ALL RESPONSES TO PHQ9 QUESTIONS 1 AND 2: 1

## 2025-05-20 ASSESSMENT — ENCOUNTER SYMPTOMS
DEPRESSION: 0
OCCASIONAL FEELINGS OF UNSTEADINESS: 0
LOSS OF SENSATION IN FEET: 0

## 2025-05-20 ASSESSMENT — PAIN SCALES - GENERAL: PAINLEVEL_OUTOF10: 0-NO PAIN

## 2025-05-20 NOTE — ASSESSMENT & PLAN NOTE
Extremely severe KG with RDI 3% of 111.8, RDI 4% of 109.0, SpO2 monica of 75%. PAP titration was completed from 4 to 12 cwp. Recommendation was for auto PAP 6-16 for fixed CPAP with nasal interface. He was setup with PAP per [facility] in sept.   Recent CHF diagnosed with EF 30%.   -Well controlled on current settings  -continue current PAP settings  -Supply order updated today  -Discussed recommended cleaning, supply replenishment guidelines. He verbalized understanding   -RTC 12 mo or sooner if needed

## 2025-05-20 NOTE — PATIENT INSTRUCTIONS
[facility] Sleep Medicine  [facility] [address]  [facility]  [address] [place] [address]       NAME: Cirilo Wills   DATE:  05/20/25    DIAGNOSIS:   1. KG (obstructive sleep apnea)  Positive Airway Pressure (PAP) Therapy      2. BMI 40.0-44.9, adult (Multi)            Thank you for coming to the Sleep Medicine Clinic today! Your sleep medicine provider today was: Maria Teresa Tiwari, YEYO-CNP Below is a summary of your treatment plan, other important information, and our contact numbers:    TREATMENT PLAN:   - Follow-up in 12 months.  - If not already done, sign up for 'My Chart' and send prescription requests or messages through this    Annual Reminders About Your Sleep Apnea    Your sleep apnea is well controlled based on reviewing your PAP Data Report.     General Reminders:  Continue current machine settings. Continue using machine every night. Need at least 4 hours daily usage.   Remember to clean your mask, tubings, and water chamber regularly as instructed.  Know the replacement schedule of your supplies/ accessories and contact your DME (durable medical equipment) provider if you are due for them.  Avoid driving or operating heavy machinery when drowsy. A person driving while sleepy is 5 times more likely to have an accident. If you feel sleepy, pull over and take a short power nap (sleep for less than 30 minutes). Otherwise, ask somebody to drive you.    Follow-up sooner through [employer] or calling our office if you have any of the following symptoms:  Snoring or stopping breathing while using the machine  Recurrence of fatigue, sleepiness, insomnia, and other symptoms you had prior using machine  Persistent or worsening fatigue or sleepiness despite regular use of machine  Issues tolerating the machine like bloating sensation, air hunger, too much hot air, too much pressure, taking off mask without recall in the middle of sleep, etc.     Other  conservative measures to improve sleep apnea:  Losing weight can lead to some improvement of KG which means you will need lower pressures in machine to control your KG. In some patients, they don't need to use the machine after bariatric surgery. Hence, consider medical and/or surgical weight loss especially if your BMI is more than 35.  Avoiding alcohol, sedative-hypnotics, and sedating medications is imperative as these substances can worsen snoring and sleep apnea  If you have nasal congestion or seasonal allergies, improving your breathing through the nose is critical for treating sleep apnea, tolerating CPAP, and improving your sleep; hence, using intranasal steroid spray like Flonase might help. Make sure you know the proper way to use it.  Stay off your back when sleeping.    Common issues with PAP machine:  Mask gets dislodged when turning to the side: Consider getting a CPAP pillow or switching to a mask with hose on top.     Dry mouth:  Your machine has built-in humidifier that heats up the air to prevent dry mouth. It can be adjusted to your comfort. You can try that first and increase setting one level one night at a time to check which setting is comfortable and effective in lessening dry mouth. If dry mouth persists despite humidity setting adjustment, may apply OTC Biotene gel over the gums at bedtime.  If Biotene gel is not effective, consider trying XEROSTOM gel from [employer].  Also, eliminate or reduce dose of meds that can cause dry mouth if possible. Lastly, may try getting a separate room humidifier machine.    Airleaks: Please call DME as they may need to adjust your mask or refit you with a different kind of mask. In addition, you can ask DME for tips on getting a good mask seal and mask fit.     Difficulty tolerating the mask: Contact your DME to try a different kind of mask and/or call office to get a referral to Sleep Psychologist for CPAP desensitization. CPAP desensitization technique  "is a set of strategies that helps patient cope with claustrophobia and anxiety related to wearing mask. Alternatively, we can do a daytime mini-sleep study called PAP-nap trial wherein you will try on different kinds of mask and the sleep technician will try different pressure settings on CPAP and BPAP machines to see which specific pressure is tolerable and comfortable for you.     Water droplets or moisture within the hose and/or mask: This is called rain-out and it is caused by condensation of too much heated humidity on the cooler walls of the hose. If you have rain-out, turn down humidity settings or get a heated hose. If you already have a heated hose, turn up the \"tube temperature\" of the heated hose. Alternatively, if you don't want to get a heated hose or warmer air, may wrap the CPAP hose with stockings to keep it somewhat warm. Also, you need to place the machine on the floor and lower the hose so that water won't travel upward towards your mask.     You can also go to the following EDUCATION WEBSITES for further information:   American Academy of Sleep Medicine http://sleepeducation.org  National Sleep Foundation: https://sleepfoundation.org  American Sleep Apnea Association: https://www.sleepapnea.org (for patients with sleep apnea)    Here at [facility], we wish you a restful sleep!     Instructions - Common KG Recs: - For your sleep apnea, continue to use your PAP every night and use it whenever you are sleeping.   - Avoid alcohol or sedatives several hours prior to sleeping.   - Get additional supplies for your PAP (e.g., mask, hose, filters) every 3 months or as your insurance allows from your [employer] company. Replacement cushions for your PAP mask can be requested monthly if airseals are an issue.  - Remember to clean your mask, tubings, and water chamber regularly as instructed.  - Avoid driving or operating heavy machinery when drowsy. A person driving while sleepy is five (5) times more " likely to have an accident. If you feel sleepy, pull over and take a short power nap (sleep for less than 30 minutes). Otherwise, ask somebody to drive you.    EASY WAYS TO IMPROVE YOUR SLEEP:  1. Go to bed and wake up at the same time every day.   Aim for 8 hours but some people need less, some need more.   Get out of bed if you are not sleeping.   Limit naps to 20 min or less.   2. Expose yourself to daylight and/ or bright light in the morning.   Go outside or spend time near a window each morning.   You can use a light box (found on Amazon) if you wake before the sunrise.   Limit light exposure in the evenings (including electronic usage).   Try meditation, reading, stretching, deep breathing, warm shower or bath, or yoga nidra as part of your bedtime routine. There are many great FREE, videos or audio tracks on [employer]/ [employer], etc for guidance.  3. Exercise, in some form, EVERY day, but not too close to bedtime. Consider making this part of your routine at the start of your day, followed by a cool shower.  4. Eat meals at roughly the same time every day. Make sure you are prioritizing fruits, vegetables, whole grains, lean proteins.  5. Time your caffeine intake. Make sure you are not drinking caffeine within 8 to 12 hours prior to your bedtime.   6. Avoid marijuana, alcohol, and nicotine. They will reduce sleep quality in any quantity.  7. Learn to manage anxiety. Psychology services at  can be reached at [phone] to schedule an appointment.     IMPORTANT INFORMATION:     Call [phone] for medical emergencies.  Our offices are generally open from Monday-Friday, 9 am - 5 pm.  If you need to get in touch with me, you may either call me and my team(number is below) or you can use [employer].  If a referral for a test, for CPAP, or for another specialist was made, and you have not heard about scheduling this within a week, please call scheduling at [phone]).  If you are unable to make your appointment  for clinic or an overnight study, kindly call the office at least 48 hours in advance to cancel and reschedule.  If you are on CPAP, please bring your device's card to each clinic appointment unless told otherwise by your provider.  There are no supporting services by either the sleep doctors or their staff on weekends and Holidays, or after 5 PM on weekdays.   If you have been asked to come to a sleep study, make sure you bring toiletries, a comfy pillow, and any nighttime medications that you may regularly take. Also be sure to eat dinner before you arrive. We generally do not provide meals.      PRESCRIPTIONS:  We require 7 days advanced notice for prescription refills. If we do not receive the request in this time, we cannot guarantee that your medication will be refilled in time. Please contact the sleep nurses listed below for refills or request via [employer].     IMPORTANT PHONE NUMBERS:   Sleep Medicine Clinic Fax: [phone]  Appointments (for Pediatric Sleep Clinic): [phone] - option 1  Appointments (for Adult Sleep Clinic): [phone] - option 2  Appointments (For Sleep Studies): [phone] - option 3  Behavioral Sleep Medicine: [phone]  Sleep Surgery: [phone]  ENT (Otolaryngology): [phone]  Headache Clinic (Neurology): [phone]  Neurology: [phone]  Psychiatry: [phone]  Pulmonary Function Testing (PFT) Center: [phone]  Pulmonary Medicine: [phone]  [employer] (DME): [phone]  [employer] (DME): [phone]  [facility] ([facility]): [phone]    Our Adult Sleep Medicine Team (Please do not hesitate to call the office or sleep nurse with any questions between appointments):    Adult Sleep Nurses (Ama Song RN and Yumiko Clark RN):  For clinical questions and refilling prescriptions: [phone]  Email sleep diaries and other documents at: [email or web address]    Adult Sleep Medicine  Secretaries:  Modesta Ashley (For Latanya/Dickey/Krise/Strohl/Yeh): P: [phone]  F: [phone]  Carmelo Hoang ([place])Office: [phone] option 4 Office Fax: [phone]  Salina Black (For [place]ant): P: [phone]  Fax: [phone]  Pari Hasliliana (For Jurcevic/Blank): P: [phone]  F: [phone]  Floryetta Booker (For Juan M): P: [phone]  F: [phone]  Bessy Marquez (For Milagros/Bassam/Zakhary): P: [phone]  F: [phone]  Pam Calvo (For Cyrus/Miller): P: [phone]  F: [phone]     Adult Sleep Medicine Advanced Practice Providers:  Jimbo Mello ([place], [place])  Trang Banegas ([facility])  Maria Teresa Tiwari CNP ([place], [place], [place])  Ghazal Delvalle CNP ([place], [place], [place])  Germaine Mcgowan ([place], [place], [place])  Dante Miller CNP ([facility])      Our Sleep Testing Center (STC) Locations:  Our team will contact you to schedule your sleep study, however, you can contact us as follow:  Main Phone Line (scheduling only): [phone]), option 3  Adult and Pediatric Locations  [facility] (6 years and older): [facility] - 4th floor ([address] [place] [address]) After hours line: [phone]  [facility] (Main campus: All ages): [facility], 6th floor. After hours line: [phone]  [facility] (5 years and older; younger considered on case-by-case basis): [facility]; [employer] [address], [address]. Scheduling  After hours line: [phone]   [place] (6 years and older): [address]; [address]; [address]   [place] (6 years and older): [address] [facility], [address]  After hours line: [phone]   [place] (13 years and older) in [place]: [address] [facility], 2nd floor  After hours line: [phone]  [facility] (13 year and older): [address]  After hours line: [phone] (Home studies out of [place]  [facility])    Adult Only Locations:   [place] (18 years and older): [address] [place] [address], 2nd floor   [place] (18 years and older): [address] [facility]; 4th floor  After hours line: [phone]   [place] (18 years and older) at [place]: [address] [facility]  After hours line: [phone]        CONTACTING YOUR SLEEP MEDICINE PROVIDER:  Send a message directly to your provider through \"My Chart\", which is the email service through your  Records Account: [email or web address]   Call [phone] and leave a message. One of the administrative assistants will forward the message to your sleep medicine provider through \"My Chart\" and/or email.     Your sleep medicine provider for this visit was: Maria Teresa Tiwari, APRN-CNP    In the event that you are running more than 15 minutes late to your appointment, I will kindly ask you to reschedule.       "

## 2025-05-20 NOTE — PROGRESS NOTES
Patient: Cirilo Wills    [id number]  : 1995 -- AGE 29 y.o.    Provider: LISSETTE Perdomo     Location [facility]   Service Date: 2025              [facility]  Follow up Visit Note      HISTORY OF PRESENT ILLNESS       HISTORY OF PRESENT ILLNESS   Cirilo Wills is a 29 y.o. male who presents to a [facility] for a sleep medicine evaluation with concerns of KG. Works remotely for the [employer].     The patient has h/o CHF EF 25-30% on echo in 2024; hyperlipidemia, obesity, prediabetes, SEBASTIAN, MS, KG.    Past Sleep History  Patient has had a sleep study in the past in 2019 in [place]. Was diagnosed with severe sleep apnea (reports AHI ~ 60) and set up with CPAP.   States he used for a month or less and returned the equipment. Was forced to sleep on his back due to the mask, felt worse when he would wake in the mornings.     Severe sleep apnea with RDI 3% of 111.8, RDI 4% of 109.0, SpO2 monica of 75%. PAP titration was completed from 4 to 12 cwp. Recommendation was for auto PAP 6-16 for fixed CPAP with nasal interface. He was setup with PAP per [facility] in sept.     Current History    Notes he is sleeping ok with PAP. Denies feeling much differently since starting. Asking about cleaning schedule, supply replenishment. Had repeat echo in Oct. Finished cardiac rehab but has not continued exercising. Notes he was on Wegovy but stopped as his provider left the practice. Has not scheduled back with PCP    Sleep schedule  on weekdays / work days:  Usual Bedtime:    12 am  Falls asleep around:  30 min  # of awakenings:   Wake time:  6 AM    Naps: afternoon none    Sleep schedule on weekends/non work days :  Usual Bedtime:    12:30  Falls asleep around:  15 min  # of awakenings:   Wake time:  8 am    Naps: none    Preferred sleeping position: side, stomach     Sleep-related  ROS:    Problems going to sleep: No problems going to sleep    Problems staying asleep : not typically    Breathing during sleep: snoring, witnessed apneas, and nocturnal reflux symptoms    Daytime Symptoms  On awakening patient reports: morning dry mouth and morning sore throat    RLS screen:  RLSSCREEN: - Sensations: Patient does not have unusual sensations in their extremities that cause an urge to move them     Sleep-related behaviors:   DENIES    Fatigue: denies    ESS: 6   CHANTEL:    FOSQ:  34      REVIEW OF SYSTEMS     REVIEW OF SYSTEMS  Review of Systems   All other systems reviewed and are negative.      ALLERGIES AND MEDICATIONS     ALLERGIES  No Known Allergies    MEDICATIONS  Current Outpatient Medications   Medication Sig Dispense Refill    atorvastatin (Lipitor) 40 mg tablet Take 1 tablet (40 mg) by mouth once daily. 90 tablet 3    carvedilol (Coreg) 6.25 mg tablet Take 1 tablet (6.25 mg) by mouth 2 times daily (morning and late afternoon). 180 tablet 3    empagliflozin (Jardiance) 10 mg Take 1 tablet (10 mg) by mouth once daily. 30 tablet 11    fluticasone (Flonase) 50 mcg/actuation nasal spray Administer 1 spray into each nostril once daily. Shake gently. Before first use, prime pump. After use, clean tip and replace cap. 16 g 11    pantoprazole (ProtoNix) 20 mg EC tablet Take 1 tablet (20 mg) by mouth once daily in the morning. Take before meals. Do not crush, chew, or split. 30 tablet 11    Rezdiffra 100 mg tablet TAKE 1 TABLET BY MOUTH 1 TIME A DAY 30 tablet 11    sacubitriL-valsartan (Entresto)  mg tablet Take 1 tablet by mouth 2 times a day. 60 tablet 11    spironolactone (Aldactone) 25 mg tablet Take 1 tablet (25 mg) by mouth once daily. 90 tablet 3    Vumerity 231 mg capsule,delayed release(DR/EC) TAKE 2 CAPSULES BY MOUTH 2 TIMES A DAY. 120 capsule 3    semaglutide, weight loss, (Wegovy) 2.4 mg/0.75 mL pen injector Inject 2.4 mg under the skin 1 (one) time per week for 16 doses. 3 mL 3      No current facility-administered medications for this visit.         PAST HISTORY     PAST MEDICAL HISTORY  Past Medical History:   Diagnosis Date    Dilated cardiomyopathy (Multi) 04/11/2024    EF 25 to 30%    Multiple sclerosis (Multi) 11/10/2022    Multiple sclerosis    Obstructive sleep apnea 03/20/2019    Thyrotoxicosis, unspecified without thyrotoxic crisis or storm 12/09/2014    Hyperthyroidism       PAST SURGICAL HISTORY:  Past Surgical History:   Procedure Laterality Date    MR HEAD ANGIO WO IV CONTRAST  3/21/2013    MR HEAD ANGIO WO IV CONTRAST 3/21/2013 [facility]       FAMILY HISTORY  Family History   Problem Relation Name Age of Onset    Heart attack Maternal Grandfather Christopher 50    Diabetes Maternal Grandfather Christopher        DOES/DOES NOT EC: does have a family history of sleep disorder. Father KG and brother suspected      SOCIAL HISTORY  He  reports that he has quit smoking. His smoking use included cigarettes. He has never used smokeless tobacco. He reports current alcohol use of about 6.0 standard drinks of alcohol per week. He reports current drug use. Frequency: 3.00 times per week. Drug: Marijuana. He currently lives alone.     Caffeine consumption: 1x day  Alcohol consumption: 3x week  Smoking: none  Marijuana: yes; vape     PHYSICAL EXAM     VITAL SIGNS: /73 (BP Location: Left arm, Patient Position: Sitting, BP Cuff Size: Large adult)   Pulse 75   Ht 1.829 m (6')   Wt 148 kg (325 lb 12.8 oz)   SpO2 92%   BMI 44.19 kg/m²      PREVIOUS WEIGHTS:  Wt Readings from Last 3 Encounters:   05/20/25 148 kg (325 lb 12.8 oz)   03/28/25 141 kg (310 lb 13.6 oz)   03/17/25 141 kg (310 lb)       Physical Exam  Physical Exam   Constitutional: Alert and oriented, cooperative, no obvious distress   HEENT: Non icteric or anemic, EOM WNL bilaterally   Neck: Supple, no JVD, no goiter, no adenopathy, no rigidity  Chest: CTA bilaterally, no wheezing, crackles, rubs   Cardiac: RRR, S1 and  S2, no murmur, rub, thrill   Abdomen: Obese, Soft, nontender, no masses, no organomegaly   Extremities: No clubbing, no LL edema   Neuromuscular: Cranial nerves grossly intact, no focal deficits      RESULTS/DATA     CARBON DIOXIDE (mmol/L)   Date Value   03/17/2025 28     Bicarbonate (mmol/L)   Date Value   10/29/2024 30   08/24/2024 27   07/16/2024 28     IRON, TOTAL (mcg/dL)   Date Value   03/17/2025 67     Iron (ug/dL)   Date Value   07/16/2024 96   04/11/2024 93     % SATURATION (% (calc))   Date Value   03/17/2025 19 (L)     % Saturation (%)   Date Value   07/16/2024 27   04/11/2024 25     IRON BINDING CAPACITY (mcg/dL (calc))   Date Value   03/17/2025 347     TIBC (ug/dL)   Date Value   07/16/2024 353   04/11/2024 377     FERRITIN (ng/mL)   Date Value   03/17/2025 472 (H)     Ferritin (ng/mL)   Date Value   07/16/2024 750 (H)   04/11/2024 588 (H)     Echo 3/27/24:      PAP Adherence:    Airsense 11 setup 9/25/24    Airfit N30 mask- medium    [employer]    Last supplies 3/5/25    ASSESSMENT/PLAN     Mr. Wills is a 29 y.o. male and He was referred to the [facility] for evaluation of KG    Problem List and Orders  Problem List Items Addressed This Visit           ICD-10-CM    KG (obstructive sleep apnea) - Primary G47.33    Extremely severe KG with RDI 3% of 111.8, RDI 4% of 109.0, SpO2 monica of 75%. PAP titration was completed from 4 to 12 cwp. Recommendation was for auto PAP 6-16 for fixed CPAP with nasal interface. He was setup with PAP per [facility] in sept.   Recent CHF diagnosed with EF 30%.   -Well controlled on current settings  -continue current PAP settings  -Supply order updated today  -Discussed recommended cleaning, supply replenishment guidelines. He verbalized understanding   -RTC 12 mo or sooner if needed            Relevant Orders    Positive Airway Pressure (PAP) Therapy    BMI 40.0-44.9, adult (Multi) Z68.41    Weight loss recommended  Follow up with PCP for  recommendations                   Follow up in 6 mo

## 2025-06-05 ENCOUNTER — OFFICE VISIT (OUTPATIENT)
Dept: URGENT CARE | Age: 30
End: 2025-06-05
Payer: COMMERCIAL

## 2025-06-05 ENCOUNTER — ANCILLARY PROCEDURE (OUTPATIENT)
Dept: URGENT CARE | Age: 30
End: 2025-06-05
Payer: COMMERCIAL

## 2025-06-05 VITALS
OXYGEN SATURATION: 96 % | RESPIRATION RATE: 18 BRPM | HEART RATE: 85 BPM | WEIGHT: 315 LBS | SYSTOLIC BLOOD PRESSURE: 128 MMHG | BODY MASS INDEX: 42.66 KG/M2 | TEMPERATURE: 98.1 F | HEIGHT: 72 IN | DIASTOLIC BLOOD PRESSURE: 90 MMHG

## 2025-06-05 DIAGNOSIS — M54.32 SCIATICA OF LEFT SIDE: ICD-10-CM

## 2025-06-05 DIAGNOSIS — S39.012A LUMBAR STRAIN, INITIAL ENCOUNTER: Primary | ICD-10-CM

## 2025-06-05 PROCEDURE — 72110 X-RAY EXAM L-2 SPINE 4/>VWS: CPT | Performed by: PHYSICIAN ASSISTANT

## 2025-06-05 RX ORDER — METHYLPREDNISOLONE 4 MG/1
TABLET ORAL
Qty: 21 TABLET | Refills: 0 | Status: SHIPPED | OUTPATIENT
Start: 2025-06-05

## 2025-06-05 RX ORDER — METHOCARBAMOL 500 MG/1
TABLET, FILM COATED ORAL
Qty: 30 TABLET | Refills: 0 | Status: SHIPPED | OUTPATIENT
Start: 2025-06-05

## 2025-06-05 ASSESSMENT — ENCOUNTER SYMPTOMS: BACK PAIN: 1

## 2025-06-05 ASSESSMENT — PAIN SCALES - GENERAL: PAINLEVEL_OUTOF10: 7

## 2025-06-05 NOTE — PROGRESS NOTES
Subjective   Patient ID: Cirilo Wills is a 29 y.o. male. They present today with a chief complaint of Back Pain.    History of Present Illness  Cirilo is a 29 year old male pmhx cardiomyopathy, MS presents to  with L low back pain x 4 days. Started after he was walking on the treadmill, which is newer activity for him. He describes achy pain, the past few days with radiation down L leg. Denies numbness or tingling. No bowel or bladder changes. No unexplained fevers. Pain worse with certain movements. He has been taking OTC tylenol with no improvement.       Back Pain        Past Medical History  Allergies as of 06/05/2025    (No Known Allergies)       Prescriptions Prior to Admission[1]     Medical History[2]    Surgical History[3]     reports that he has quit smoking. His smoking use included cigarettes. He has never used smokeless tobacco. He reports current alcohol use of about 6.0 standard drinks of alcohol per week. He reports current drug use. Frequency: 3.00 times per week. Drug: Marijuana.    Review of Systems  Review of Systems   Musculoskeletal:  Positive for back pain.                                  Objective    Vitals:    06/05/25 0835   BP: 128/90   Pulse: 85   Resp: 18   Temp: 36.7 °C (98.1 °F)   TempSrc: Oral   SpO2: 96%   Weight: (!) 151 kg (333 lb)   Height: 1.829 m (6')     No LMP for male patient.    Physical Exam  Constitutional:       Appearance: Normal appearance. He is obese.   HENT:      Head: Normocephalic and atraumatic.   Cardiovascular:      Rate and Rhythm: Normal rate.      Heart sounds: No murmur heard.  Pulmonary:      Effort: Pulmonary effort is normal.      Breath sounds: No wheezing.   Abdominal:      Tenderness: There is no abdominal tenderness. There is no right CVA tenderness, left CVA tenderness or guarding.   Musculoskeletal:      Cervical back: Normal and normal range of motion.      Thoracic back: Normal.      Lumbar back: Tenderness present. No bony  tenderness. Positive right straight leg raise test. Negative left straight leg raise test.      Comments: Pain with lateral rotation to to the R, flexion and extension    Neurological:      Mental Status: He is alert and oriented to person, place, and time.      Sensory: No sensory deficit.      Coordination: Coordination normal.      Gait: Gait normal.      Deep Tendon Reflexes: Reflexes normal.   Psychiatric:         Mood and Affect: Mood normal.         Behavior: Behavior normal.         Procedures    Point of Care Test & Imaging Results from this visit  No results found for this visit on 06/05/25.   Imaging  XR lumbar spine complete 4+ views  Result Date: 6/5/2025  No fracture-dislocation.     Signed by: Lacy Nolasco 6/5/2025 10:15 AM Dictation workstation:   [id number]      Cardiology, Vascular, and Other Imaging  No other imaging results found for the past 2 days      Diagnostic study results (if any) were reviewed by Yumiko Guerrero PA-C.    Assessment/Plan   Allergies, medications, history, and pertinent labs/EKGs/Imaging reviewed by Yumiko Guerrero PA-C.     Medical Decision Making  Mr. Wills presents with low back pain after walking on treadmill. Normal neurologic exam today with signs of cauda equina. VSS. XR obtained showing no acute changes. Exam consistent with MSK etiology. Will add in medrol dosepack and robaxin PRN. Close PCP follow up and ED visit for worsening.   Plan of care discussed with patient and/or family who verbalized understanding. Recommend Follow up with PCP. Advised seeking immediate emergency medical attention if symptoms fail to improve, worsen or any concerning symptoms arise. Patient/Guardian voiced full understanding and agreement to plan.      Orders and Diagnoses  Diagnoses and all orders for this visit:  Lumbar strain, initial encounter  Sciatica of left side  -     XR lumbar spine complete 4+ views; Future  -     methylPREDNISolone (Medrol Dospak) 4 mg tablets; Take as  directed on package.  -     methocarbamol (Robaxin) 500 mg tablet; 2 tablets PO TID PRN back pain/spasm      Medical Admin Record      Patient disposition: Home    Electronically signed by Yumiko Guerrero PA-C  10:44 AM           [1] (Not in a hospital admission)   [2]   Past Medical History:  Diagnosis Date    Dilated cardiomyopathy (Multi) 04/11/2024    EF 25 to 30%    Multiple sclerosis (Multi) 11/10/2022    Multiple sclerosis    Obstructive sleep apnea 03/20/2019    Thyrotoxicosis, unspecified without thyrotoxic crisis or storm 12/09/2014    Hyperthyroidism   [3]   Past Surgical History:  Procedure Laterality Date    MR HEAD ANGIO WO IV CONTRAST  3/21/2013    MR HEAD ANGIO WO IV CONTRAST 3/21/2013 [facility]

## 2025-06-06 ENCOUNTER — TELEPHONE (OUTPATIENT)
Dept: PRIMARY CARE | Facility: CLINIC | Age: 30
End: 2025-06-06
Payer: COMMERCIAL

## 2025-06-16 ENCOUNTER — OFFICE VISIT (OUTPATIENT)
Dept: NEUROLOGY | Facility: CLINIC | Age: 30
End: 2025-06-16
Payer: COMMERCIAL

## 2025-06-16 VITALS
WEIGHT: 315 LBS | DIASTOLIC BLOOD PRESSURE: 77 MMHG | BODY MASS INDEX: 44.62 KG/M2 | SYSTOLIC BLOOD PRESSURE: 130 MMHG | RESPIRATION RATE: 18 BRPM | HEART RATE: 88 BPM

## 2025-06-16 DIAGNOSIS — G35 MULTIPLE SCLEROSIS (MULTI): ICD-10-CM

## 2025-06-16 DIAGNOSIS — G35 MULTIPLE SCLEROSIS (MULTI): Primary | ICD-10-CM

## 2025-06-16 DIAGNOSIS — D84.9 IMMUNOSUPPRESSED STATUS: ICD-10-CM

## 2025-06-16 PROCEDURE — 99214 OFFICE O/P EST MOD 30 MIN: CPT | Performed by: NURSE PRACTITIONER

## 2025-06-16 PROCEDURE — 1036F TOBACCO NON-USER: CPT | Performed by: NURSE PRACTITIONER

## 2025-06-16 RX ORDER — CYCLOBENZAPRINE HCL 5 MG
5 TABLET ORAL 2 TIMES DAILY
Qty: 180 TABLET | Refills: 0 | Status: SHIPPED | OUTPATIENT
Start: 2025-06-16 | End: 2025-09-14

## 2025-06-16 ASSESSMENT — PAIN SCALES - GENERAL: PAINLEVEL_OUTOF10: 0-NO PAIN

## 2025-06-16 NOTE — PROGRESS NOTES
Onset: 03/13  Diagnosis of MS: 07/14  Disease course at onset: RR  Current disease course: RR  Previous disease therapies: IVMP in 2013, Tecfidera since end of 8/14 stopped d/t insurance 2021.  Current disease therapies: Vumerity   Most recent MRI brain: 4/225- stable  Most recent MRI cervical spine: 03/11/15  Most recent MRI thoracic spine: not done  CSF: 03/2013  JCV serology and date: not done  CBC:  WBC- 9.1  ALC-1.58    Subjective   Cirilo Wills is a 29 y.o.here for a follow up of his MS, he takes Vumerity and tolerating well.  Last MRI of the brain was April 2025 and stable.  He reports pain going down the back of his left leg, after working out a couple of weeks ago. No numbness or tingling reported, but does report stiffness in the left leg.    ROS  No bowel or bladder issues, stable gait some pain going down the back of his left left leg, no recent infections, and no trouble swallowing.    Objective   Neurological Exam  Mental Status  Alert. Oriented to person, place, time and situation. Oriented to person, place, and time. Memory is normal. Speech is normal. Language is fluent with no aphasia. Attention and concentration are normal.    Cranial Nerves  CN I: Sense of smell is normal.  CN II: Right visual acuity: Finger movement. Left visual acuity: Finger movement. Right normal visual field. Left normal visual field. Right funduscopic exam: disc intact. Left funduscopic exam: disc intact.  CN III, IV, VI: Extraocular movements intact bilaterally. Normal lids and orbits bilaterally. Pupils equal round and reactive to light bilaterally.  CN V: Facial sensation is normal.  CN VII: Full and symmetric facial movement.  CN VIII: Hearing is normal.  CN IX, X: Palate elevates symmetrically  CN XI: Shoulder shrug strength is normal.  CN XII: Tongue midline without atrophy or fasciculations.    Motor  Normal muscle bulk throughout. Normal muscle tone. Strength is 5/5 throughout all four extremities.  Pain  in his left leg during assessment..    Sensory  Light touch abnormality: Decreased sensation on right side of the face..     Reflexes                                            Right                      Left  Brachioradialis                    2+                         2+  Biceps                                 2+                         2+  Triceps                                2+                         2+  Patellar                                1+                         1+   Right palmar grasp present. Left palmar grasp present.    Coordination    Finger-to-nose, rapid alternating movements and heel-to-shin normal bilaterally without dysmetria.  9 hole peg test: Right  hand 16.4 sec. Left hand 16.8 sec..    Gait  Normal casual, toe, heel and tandem gait. Timed get up and go test: 6 seconds.    Physical Exam  Constitutional:       Appearance: Normal appearance.   HENT:      Head: Normocephalic.      Right Ear: Tympanic membrane normal.      Left Ear: Tympanic membrane normal.      Nose: Nose normal.      Mouth/Throat:      Mouth: Mucous membranes are moist.   Eyes:      General: Lids are normal.      Extraocular Movements: Extraocular movements intact.      Pupils: Pupils are equal, round, and reactive to light.   Cardiovascular:      Rate and Rhythm: Normal rate.   Pulmonary:      Effort: Pulmonary effort is normal.   Abdominal:      General: Abdomen is flat.   Musculoskeletal:         General: Normal range of motion.      Cervical back: Full passive range of motion without pain and normal range of motion.   Skin:     General: Skin is warm and dry.   Neurological:      Mental Status: He is alert and oriented to person, place, and time.      Motor: Motor strength is normal.     Coordination: Coordination is intact.      Deep Tendon Reflexes:      Reflex Scores:       Tricep reflexes are 2+ on the right side and 2+ on the left side.       Bicep reflexes are 2+ on the right side and 2+ on the left side.        Brachioradialis reflexes are 2+ on the right side and 2+ on the left side.       Patellar reflexes are 1+ on the right side and 1+ on the left side.  Psychiatric:         Attention and Perception: Attention normal.         Mood and Affect: Mood normal.         Speech: Speech normal.         Behavior: Behavior is cooperative.         Cognition and Memory: Cognition and memory normal.         Judgment: Judgment normal.     Provider Impression  Cirilo Wills is a 29 y.o.here for a follow up of his MS, he takes Vumerity and tolerating well.  Last MRI of the brain was April 2025 and stable.      We discussed the importance of living healthy life style with diet and exercise and the importance of V-D in patient's with MS.    The total face to face appointment was 35 minutes and more than 50% of the visit was spent counseling and coordination of care.    I personally reviewed laboratory, radiographic, and medical studies which were pertinent for today's visit.    Plan  - Continue Vumerity.  - Labs today.  - MRI April of 2026.  - Follow up with PCP.  - Follow up 6 months.

## 2025-06-17 LAB
ALBUMIN SERPL-MCNC: 4.8 G/DL (ref 3.6–5.1)
ALBUMIN/GLOB SERPL: 2 (CALC) (ref 1–2.5)
ALP SERPL-CCNC: 44 U/L (ref 36–130)
ALT SERPL-CCNC: 29 U/L (ref 9–46)
AST SERPL-CCNC: 19 U/L (ref 10–40)
BASOPHILS # BLD AUTO: 63 CELLS/UL (ref 0–200)
BASOPHILS NFR BLD AUTO: 0.7 %
BILIRUB DIRECT SERPL-MCNC: 0.2 MG/DL
BILIRUB INDIRECT SERPL-MCNC: 0.7 MG/DL (CALC) (ref 0.2–1.2)
BILIRUB SERPL-MCNC: 0.9 MG/DL (ref 0.2–1.2)
EOSINOPHIL # BLD AUTO: 216 CELLS/UL (ref 15–500)
EOSINOPHIL NFR BLD AUTO: 2.4 %
ERYTHROCYTE [DISTWIDTH] IN BLOOD BY AUTOMATED COUNT: 12.5 % (ref 11–15)
GLOBULIN SER CALC-MCNC: 2.4 G/DL (CALC) (ref 1.9–3.7)
HCT VFR BLD AUTO: 46.3 % (ref 38.5–50)
HGB BLD-MCNC: 15.6 G/DL (ref 13.2–17.1)
LYMPHOCYTES # BLD AUTO: 2133 CELLS/UL (ref 850–3900)
LYMPHOCYTES NFR BLD AUTO: 23.7 %
MCH RBC QN AUTO: 32.8 PG (ref 27–33)
MCHC RBC AUTO-ENTMCNC: 33.7 G/DL (ref 32–36)
MCV RBC AUTO: 97.5 FL (ref 80–100)
MONOCYTES # BLD AUTO: 909 CELLS/UL (ref 200–950)
MONOCYTES NFR BLD AUTO: 10.1 %
NEUTROPHILS # BLD AUTO: 5679 CELLS/UL (ref 1500–7800)
NEUTROPHILS NFR BLD AUTO: 63.1 %
PLATELET # BLD AUTO: 248 THOUSAND/UL (ref 140–400)
PMV BLD REES-ECKER: 10.4 FL (ref 7.5–12.5)
PROT SERPL-MCNC: 7.2 G/DL (ref 6.1–8.1)
RBC # BLD AUTO: 4.75 MILLION/UL (ref 4.2–5.8)
WBC # BLD AUTO: 9 THOUSAND/UL (ref 3.8–10.8)

## 2025-06-17 RX ORDER — DIROXIMEL FUMARATE 231 MG/1
2 CAPSULE ORAL 2 TIMES DAILY
Qty: 120 CAPSULE | Refills: 3 | Status: SHIPPED | OUTPATIENT
Start: 2025-06-17

## 2025-06-23 ENCOUNTER — APPOINTMENT (OUTPATIENT)
Dept: PRIMARY CARE | Facility: CLINIC | Age: 30
End: 2025-06-23
Payer: COMMERCIAL

## 2025-06-23 VITALS
SYSTOLIC BLOOD PRESSURE: 109 MMHG | BODY MASS INDEX: 44.76 KG/M2 | WEIGHT: 315 LBS | HEART RATE: 76 BPM | DIASTOLIC BLOOD PRESSURE: 64 MMHG | TEMPERATURE: 97.9 F

## 2025-06-23 DIAGNOSIS — M54.16 LUMBAR RADICULOPATHY: Primary | ICD-10-CM

## 2025-06-23 DIAGNOSIS — F43.29 STRESS AND ADJUSTMENT REACTION: ICD-10-CM

## 2025-06-23 PROCEDURE — 1036F TOBACCO NON-USER: CPT | Performed by: FAMILY MEDICINE

## 2025-06-23 PROCEDURE — 99214 OFFICE O/P EST MOD 30 MIN: CPT | Performed by: FAMILY MEDICINE

## 2025-06-23 ASSESSMENT — PATIENT HEALTH QUESTIONNAIRE - PHQ9
SUM OF ALL RESPONSES TO PHQ9 QUESTIONS 1 & 2: 2
1. LITTLE INTEREST OR PLEASURE IN DOING THINGS: SEVERAL DAYS
2. FEELING DOWN, DEPRESSED OR HOPELESS: SEVERAL DAYS

## 2025-06-23 NOTE — PROGRESS NOTES
Subjective   Patient ID: Cirilo Wills is a 29 y.o. male who presents for Follow-up.  HPI    The patient is a 28 yo AA Male with a history of HDL, HTN, hypothyroidism, prediabetes, KG, low vit D, diverticulitis,    hyperlipidemia, hypertension, multiple sclerosis (quiescent, no flares since ~2015), hypothyroidism, GERD, obesity with BMI 42 kg/m² maintained on semaglutide- now off consider prescribing clinician no longer with [facility], HFrEF and on TTE 3/2024 LVEF 25-30 % LVIDD 5.5 cm, normal right ventricular systolic function, NICM, and possible evidence of past myocarditis, history of mild COVID May 2019.    Patient is seen today for the management of a left low back pain that started on 6/4/2025 while he was exercising, walking on the treadmill.  The pain started to radiate into the left leg.  It was treated at the local Urgent care on 6/05/2025 with medrol dose pack and Robaxin PRN.  Today it is at 3/10.    A review of system was completed.  All systems were reviewed and were normal, except for the ones that are listed in the HPI.    Objective   Physical Exam  Constitutional:       Appearance: Normal appearance.   HENT:      Head: Normocephalic and atraumatic.      Right Ear: Tympanic membrane, ear canal and external ear normal.      Left Ear: Tympanic membrane, ear canal and external ear normal.      Nose: Nose normal.      Mouth/Throat:      Mouth: Mucous membranes are moist.      Pharynx: Oropharynx is clear.   Eyes:      Extraocular Movements: Extraocular movements intact.      Conjunctiva/sclera: Conjunctivae normal.      Pupils: Pupils are equal, round, and reactive to light.   Cardiovascular:      Rate and Rhythm: Normal rate and regular rhythm.      Pulses: Normal pulses.   Pulmonary:      Effort: Pulmonary effort is normal.      Breath sounds: Normal breath sounds.   Abdominal:      General: Abdomen is flat. Bowel sounds are normal.      Palpations: Abdomen is soft.   Musculoskeletal:          General: Normal range of motion.      Cervical back: Normal range of motion and neck supple.   Skin:     General: Skin is warm.   Neurological:      General: No focal deficit present.      Mental Status: He is alert and oriented to person, place, and time. Mental status is at baseline.   Psychiatric:         Mood and Affect: Mood normal.         Behavior: Behavior normal.         Thought Content: Thought content normal.         Judgment: Judgment normal.     Assessment/Plan   Problem List Items Addressed This Visit       Lumbar radiculopathy - Primary    -Not controlled despite APAP and Robaxin PRN.   -PT referral done.          Relevant Orders    Referral to Physical Therapy    Stress and adjustment reaction    -Patient is declining a referral to a psychologist.         Patient to return to office in 2- 4 weeks.

## 2025-07-16 NOTE — PROGRESS NOTES
Physical Therapy    Physical Therapy Lumbar Spine Evaluation    Patient Name: Cirilo Wills  MRN: [id number]  Today's Date: 7/17/2025  Time Calculation  Start Time: 1430  Stop Time: 1518  Time Calculation (min): 48 min  PT Evaluation Time Entry  PT Evaluation (Low) Time Entry: 32  PT Therapeutic Procedures Time Entry  Therapeutic Exercise Time Entry: 9                 Payor: [facility] / Plan: [employer] / Product Type: *No Product type* /     Reason for Referral: L LBP with radic  General Comment: Visit 1 of 60    Current Problem  Problem List Items Addressed This Visit    None  Visit Diagnoses         Codes      Chronic left-sided lumbar radiculopathy    -  Primary M54.16    Relevant Orders    Follow Up In Physical Therapy             Precautions  Precautions  Precautions Comment: MS, Cardiomyopathy       Pain  Pain Assessment: 0-10  0-10 (Numeric) Pain Score: 2  Pain at worst: 7/10      SUBJECTIVE:   Pain location: L LBP with L radic from side of thigh and around to the front. Stops at ankle   Tightness in LB, N/T in LLE   Urgent care-prescribed medrol dose pack-this helped     Hx of MS that impacts his R side       Onset: 6/2/25  While exercising, walking on TM. 6/4/25 is when the leg symptoms started. Notes the TM was on a very slight incline   Denies previous hx    +N/T intermittent   -B/B  -Cough/sneeze/strain    Reviewed medical hx form     Imaging:  XR 6/5/25 Lumbar spine     FINDINGS:  Multiple views of the  lumbar spine are obtained. Alignment is  intact. The vertebral body heights and disc heights are preserved. No  significant arthritic changes.. No acute fracture-dislocation.      IMPRESSION:  No fracture-dislocation.    Aggravating factors:  Standing and walking  Sometimes sitting     Alleviating factors:  Steroid taper  Rising     Prior level of function:  Previously independent with all functional activity    Functional limitations:  Sometimes just small distances  in the house   Standing  Rising     Home setup:  Reviewed and no concern    Work:  Supervisory , works from home     Patient stated goal:  Stretches/exercises to reduce the chance of recurrence     Prior tx:  No PT     Objective:    Lower Extremity Strength: (WNL unless documented below) (p=pain)  MMT 5/5 max RIGHT LEFT   Hip Flexion 4 4   Hip Extension 4 4   Hip Abduction 4 4   Hip Adduction  4 4   Knee Extension 5 5   Knee Flexion 5 5     Lower Extremity Flexibility: (WNL unless documented below) (p=pain)  Flexibility  RIGHT LEFT   Quad 10 10   Hamstring  30  55 pain    Hip flexor  Mod loss Mod loss    Piriformis  Mod loss Mod loss     Lower Extremity ROM: (WNL unless documented below) (p=pain)   ROM in Degrees  RIGHT LEFT   Hip Flexion     Hip Extension     Hip Abduction     Hip Adduction     Hip ER     Hip IR  Mod loss Mod loss    Knee Extension      Knee Flexion        Lumbar ROM:   Flexion WNL, increased LLE into ankle    Extension WNL, increased LLE above knee     RIGHT LEFT   Side Bend NT NT     Jonna Lumbar repeated movements:   Pretest Symptoms:    RFIS NT    NINFA NE symptoms to knee         Static Testing: prone lying x 2 min, prone on elbows -> centralized to buttock and and LB    REIL x 5: decreased to 1/10 centralized to LB     Special tests: (WNL unless documented below)    RIGHT LEFT   Slump  - +   SLR + +     Myotomes: (WNL unless documented below)     Dermatomes: (WNL unless documented below)     Posture: rounded shoulder, slouched   Joint mobility: NT       Outcome Measure:  Other Measures  Oswestry Disablity Index (ANETA): 17      TREATMENT:  Initial evaluation completed. Issued and reviewed HEP with pt that included:  Access Code: [email or web address]  URL: [email or web address]  Date: 07/17/2025  Prepared by: Cinthia Page    Exercises  - Prone on Elbows Stretch  - 7 x weekly - 2-5 minutes hold - every 2-3 hours  frequency   - Prone Press Up  - 7 x weekly -  5-10 reps - every 2-3 hours frequency   - Seated Correct Posture  - 7 x weekly  - Supine Sciatic Nerve Glide  - 1 x daily - 7 x weekly - 3 sets - 10 reps - 2-3 seconds hold    Patient Education  - Lumbar Disk Herniation    Pt educated on the importance of monitoring symptoms for centralization and peripheralization with all activity and exercises.     ASSESSEMENT  The pt presents with signs and symptoms consistent with the Physical Therapy diagnosis of L LBP with L radic that began 6/2/25.   Pt demonstrates decreased LE flexibility. +Slump, SLR. He does not have red flags or neuro deficits at this time. Centralized with prone exercises/positions. Suspect possible lumbar derangement. Will continue to monitor symptoms and response to tx. Pt will benefit from skilled physical therapy to reduce impairments in order to return to prior level of function, reduce pain, increase strength and ROM and improve overall posture.     The physical therapy prognosis is good for the patient to achieve their goals.   The pt tolerated therapy treatment today well with no adverse effects.    Personal factors/barriers to learning that impact therapy include:  Co-morbidities  Clinical presentation: Stable and/or uncomplicated characteristics  Level of clinical decision making is Low    PLAN  The pt will be seen 1-2 time(s) a week for 6 weeks.      The pt has been educated about the risks and benefits of physical therapy including manual therapy treatments and gives consent for treatment.     The patient will benefit from physical therapy treatment to include: therapeutic exercises, therapeutic activities, neurological re-education, manual therapy, modalities, and a home exercise program.       Goals:  Active       PT Problem       Reduce pain at worst to 0-3/10 with all functional and recreational activity.        Start:  07/17/25    Expected End:  10/15/25            Increase by > or = 25% without increased pain to perform  dressing/bathing/grooming tasks and other functional tasks         Start:  07/17/25    Expected End:  10/15/25            Increase by > or = 1/2 mm grade to improve postural awareness and body mechanics to perform daily tasks without increased pain/compensation          Start:  07/17/25    Expected End:  10/15/25            Pt to have decrease in Oswestry by 10% to display increased overall function.        Start:  07/17/25    Expected End:  10/15/25            Sit with proper posture > or = 15 minutes without VC's or increased pain to drive, eat meals, read         Start:  07/17/25    Expected End:  10/15/25            Patient will demonstrate independence in home program for support of progression       Start:  07/17/25    Expected End:  10/15/25

## 2025-07-17 ENCOUNTER — EVALUATION (OUTPATIENT)
Dept: PHYSICAL THERAPY | Facility: CLINIC | Age: 30
End: 2025-07-17
Payer: COMMERCIAL

## 2025-07-17 DIAGNOSIS — M54.16 CHRONIC LEFT-SIDED LUMBAR RADICULOPATHY: Primary | ICD-10-CM

## 2025-07-17 PROCEDURE — 97161 PT EVAL LOW COMPLEX 20 MIN: CPT | Mod: GP | Performed by: PHYSICAL THERAPIST

## 2025-07-17 PROCEDURE — 97110 THERAPEUTIC EXERCISES: CPT | Mod: GP | Performed by: PHYSICAL THERAPIST

## 2025-07-17 ASSESSMENT — PAIN - FUNCTIONAL ASSESSMENT: PAIN_FUNCTIONAL_ASSESSMENT: 0-10

## 2025-07-17 ASSESSMENT — PAIN SCALES - GENERAL: PAINLEVEL_OUTOF10: 2

## 2025-07-21 ENCOUNTER — TREATMENT (OUTPATIENT)
Dept: PHYSICAL THERAPY | Facility: CLINIC | Age: 30
End: 2025-07-21
Payer: COMMERCIAL

## 2025-07-21 DIAGNOSIS — M54.16 CHRONIC LEFT-SIDED LUMBAR RADICULOPATHY: ICD-10-CM

## 2025-07-21 PROCEDURE — 97110 THERAPEUTIC EXERCISES: CPT | Mod: GP,CQ

## 2025-07-21 ASSESSMENT — PAIN SCALES - GENERAL: PAINLEVEL_OUTOF10: 0 - NO PAIN

## 2025-07-21 ASSESSMENT — PAIN - FUNCTIONAL ASSESSMENT: PAIN_FUNCTIONAL_ASSESSMENT: 0-10

## 2025-07-21 NOTE — PROGRESS NOTES
Physical Therapy Treatment    Patient Name: Cirilo Wills  MRN: [id number]  Today's Date: 7/21/2025  Visit #2  Time Calculation  Start Time: 1447  Stop Time: 1530  Time Calculation (min): 43 min     PT Therapeutic Procedures Time Entry  Therapeutic Exercise Time Entry: 43                 Payor: [facility] / Plan: [employer] / Product Type: *No Product type* /   Blessing Fisher  Reason for Referral: L LBP with radic  General Comment: Visit 2 of 60      Current Problem:  Problem List Items Addressed This Visit    None  Visit Diagnoses         Codes      Chronic left-sided lumbar radiculopathy     M54.16          Subjective   General:  Pt reports overall pain/radiculopathy is improved since doing NINFA.   It is difficult to get on/off the floor so he has not been performing prone exercises as often.   Currently, no pain.  He did experience some radicular pain this morning.     HEP Compliance: Yes    Pain:  Pain Assessment: 0-10  0-10 (Numeric) Pain Score: 0 - No pain      Precautions:  Precautions  Precautions Comment: MS (R affected), Cardiomyopathy      Objective   No objective measures taken this visit    Treatment:  Therapeutic exercise  Upright bike L 2.5 x5 min   NINFA x10 , PRN   SLR 2x10, BL    Clamshell 2x10  Prone lying x1 min  Prone press ups 2x10 (with OP)  Prone HS curl 2x10  Prone hip ext x10   Rows GTB 2x10 - added to HEP   Pull downs GTB - radicular pain   HSS x 1 min, seated-added to HEP     Manual     Informed consent given on manual treatment. Pt given opportunity to cease treatment at any time. Educated pt on expected soreness, possible ecchymosis. Pt voiced understanding .  No adverse effects were noted post tx.        HEP:  Access Code: [email or web address]  URL: [email or web address]  Date: 07/21/2025  Prepared by: Cinthia Andujar    Exercises  - Standing Back Extension  - 3-5 x daily - 7 x weekly - 1-2 sets - 10 reps  - Standing Shoulder Row with  Anchored Resistance  - 1 x daily - 7 x weekly - 2 sets - 10 reps  - Prone on Elbows Stretch  - 7 x weekly - 2-5 minutes hold - every 2-3 hours  frequency   - Prone Press Up  - 7 x weekly - 5-10 reps - every 2-3 hours frequency   - Supine Sciatic Nerve Glide  - 1 x daily - 7 x weekly - 3 sets - 10 reps - 2-3 seconds hold  - Seated Correct Posture  - 7 x weekly  - Seated Hamstring Stretch  - 1 x daily - 7 x weekly - 1-2 sets - 30-60 sec hold    Patient Education  - Lumbar Disk Herniation  Assessment   Pt is responding well to extension program with overall improvement in radicular symptoms. Obvious weakness on L>R with exercises but able to complete with adequate rest breaks.  Pt fatigued post tx, no pain. Issued updated HEP and body mechanics handout.   Plan    Continue to progress  extension program as tolerated  Follow up with  updated HEP and body mechanics handout         Goals:  Active       PT Problem       Reduce pain at worst to 0-3/10 with all functional and recreational activity.        Start:  07/17/25    Expected End:  10/15/25            Increase by > or = 25% without increased pain to perform dressing/bathing/grooming tasks and other functional tasks         Start:  07/17/25    Expected End:  10/15/25            Increase by > or = 1/2 mm grade to improve postural awareness and body mechanics to perform daily tasks without increased pain/compensation          Start:  07/17/25    Expected End:  10/15/25            Pt to have decrease in Oswestry by 10% to display increased overall function.        Start:  07/17/25    Expected End:  10/15/25            Sit with proper posture > or = 15 minutes without VC's or increased pain to drive, eat meals, read         Start:  07/17/25    Expected End:  10/15/25            Patient will demonstrate independence in home program for support of progression       Start:  07/17/25    Expected End:  10/15/25

## 2025-07-28 ENCOUNTER — TREATMENT (OUTPATIENT)
Dept: PHYSICAL THERAPY | Facility: CLINIC | Age: 30
End: 2025-07-28
Payer: COMMERCIAL

## 2025-07-28 DIAGNOSIS — M54.16 CHRONIC LEFT-SIDED LUMBAR RADICULOPATHY: ICD-10-CM

## 2025-07-28 PROCEDURE — 97110 THERAPEUTIC EXERCISES: CPT | Mod: GP,CQ

## 2025-07-28 ASSESSMENT — PAIN SCALES - GENERAL: PAINLEVEL_OUTOF10: 0 - NO PAIN

## 2025-07-28 ASSESSMENT — PAIN - FUNCTIONAL ASSESSMENT: PAIN_FUNCTIONAL_ASSESSMENT: 0-10

## 2025-07-28 NOTE — PROGRESS NOTES
Physical Therapy Treatment    Patient Name: Cirilo Wills  MRN: [id number]  Today's Date: 7/28/2025  Visit #3  Time Calculation  Start Time: 1447  Stop Time: 1533  Time Calculation (min): 46 min     PT Therapeutic Procedures Time Entry  Therapeutic Exercise Time Entry: 40  Manual Therapy Time Entry: 6                 Payor: [facility] / Plan: [facility] [employer] / Product Type: *No Product type* /   Blessing Fisher  Reason for Referral: L LBP with radic  General Comment: Visit 3 of 60      Current Problem:  Problem List Items Addressed This Visit    None  Visit Diagnoses         Codes      Chronic left-sided lumbar radiculopathy     M54.16          Subjective   General:  Pt reports overall continues to improve with extension exercises.   He has not experienced radicular pain in a few weeks.   He still experiences back pain (tightness) and weakness hayley with prolonged walking/ standing.     HEP Compliance: Yes    Pain:  Pain Assessment: 0-10  0-10 (Numeric) Pain Score: 0 - No pain  Pain Location: Back  Pain Orientation: Right, Left      Precautions:  Precautions  Precautions Comment: MS (R affected), Cardiomyopathy      Objective   No objective measures taken this visit    Treatment:  Therapeutic exercise  Upright bike L 2.5 x5 min /Nustep   Standing HSS x1 min  Seated piriformis stretch x1 min BL   NINFA x10 , PRN   SLR 2x10, BL    Clamshell 2x10 RTB   Prone lying x1 min  Prone press ups 2x10 (with OP)  Prone HS curl 2x10 2#   Prone hip ext 2x10   Standing trunk rotation GTB x10  3 way abs GTB x10 ea     Manual   STM to BL L/S  Informed consent given on manual treatment. Pt given opportunity to cease treatment at any time. Educated pt on expected soreness, possible ecchymosis. Pt voiced understanding .  No adverse effects were noted post tx.        HEP:  Access Code: [email or web address]  URL: [email or web address]  Date: 07/28/2025  Prepared by: Cinthia  Con    Exercises  - Standing Back Extension  - 3-5 x daily - 7 x weekly - 1-2 sets - 10 reps  - Standing Shoulder Row with Anchored Resistance  - 1 x daily - 7 x weekly - 2 sets - 10 reps  - Shoulder Extension with Resistance  - 1 x daily - 7 x weekly - 2 sets - 10 reps  - Prone on Elbows Stretch  - 7 x weekly - 2-5 minutes hold - every 2-3 hours  frequency   - Prone Press Up  - 7 x weekly - 5-10 reps - every 2-3 hours frequency   - Supine Sciatic Nerve Glide  - 1 x daily - 7 x weekly - 3 sets - 10 reps - 2-3 seconds hold  - Seated Correct Posture  - 7 x weekly  - Seated Hamstring Stretch  - 1 x daily - 7 x weekly - 1-2 sets - 30-60 sec hold    Patient Education  - Lumbar Disk Herniation  Assessment   Progressed LE and core strengthening this visit. Pt tolerated well without radicular pain. Tightness and TTP with manual tx but responded well.    Overall progressing well with extension program   Plan    Continue to progress extension program as tolerated, strengthening   Follow up with response to manual tx       Goals:  Active       PT Problem       Reduce pain at worst to 0-3/10 with all functional and recreational activity.        Start:  07/17/25    Expected End:  10/15/25            Increase by > or = 25% without increased pain to perform dressing/bathing/grooming tasks and other functional tasks         Start:  07/17/25    Expected End:  10/15/25            Increase by > or = 1/2 mm grade to improve postural awareness and body mechanics to perform daily tasks without increased pain/compensation          Start:  07/17/25    Expected End:  10/15/25            Pt to have decrease in Oswestry by 10% to display increased overall function.        Start:  07/17/25    Expected End:  10/15/25            Sit with proper posture > or = 15 minutes without VC's or increased pain to drive, eat meals, read         Start:  07/17/25    Expected End:  10/15/25            Patient will demonstrate independence in home program  for support of progression       Start:  07/17/25    Expected End:  10/15/25

## 2025-08-04 ENCOUNTER — TREATMENT (OUTPATIENT)
Dept: PHYSICAL THERAPY | Facility: CLINIC | Age: 30
End: 2025-08-04
Payer: COMMERCIAL

## 2025-08-04 DIAGNOSIS — M54.16 CHRONIC LEFT-SIDED LUMBAR RADICULOPATHY: ICD-10-CM

## 2025-08-04 PROCEDURE — 97140 MANUAL THERAPY 1/> REGIONS: CPT | Mod: GP | Performed by: PHYSICAL THERAPIST

## 2025-08-04 PROCEDURE — 97110 THERAPEUTIC EXERCISES: CPT | Mod: GP | Performed by: PHYSICAL THERAPIST

## 2025-08-04 ASSESSMENT — PAIN SCALES - GENERAL: PAINLEVEL_OUTOF10: 0 - NO PAIN

## 2025-08-04 ASSESSMENT — PAIN - FUNCTIONAL ASSESSMENT: PAIN_FUNCTIONAL_ASSESSMENT: 0-10

## 2025-08-04 NOTE — PROGRESS NOTES
Physical Therapy Treatment    Patient Name: Cirilo Wills  MRN: [id number]  Today's Date: 8/4/2025  Visit #4  Time Calculation  Start Time: 1445  Stop Time: 1527  Time Calculation (min): 42 min     PT Therapeutic Procedures Time Entry  Therapeutic Exercise Time Entry: 32  Manual Therapy Time Entry: 10                 Payor: [facility] / Plan: [employer] / Product Type: *No Product type* /   Blessing Fisher  Reason for Referral: L LBP with radic  General Comment: Visit 4 of 60      Current Problem:  Problem List Items Addressed This Visit    None  Visit Diagnoses         Codes      Chronic left-sided lumbar radiculopathy     M54.16            Subjective   General:  Pt reports improvement overall. No radic lately. Last episode was a week and half ago.       HEP Compliance: Yes    Pain:  Pain Assessment: 0-10  0-10 (Numeric) Pain Score: 0 - No pain      Precautions:  Precautions  Precautions Comment: MS (R affected), Cardiomyopathy      Objective   No objective measures taken this visit    Treatment:  Therapeutic exercise  Upright bike L 2.5 x5 min /Nustep   Standing HSS x1 min  Seated piriformis stretch x1 min BL   Prone Q/HF stretch 30 sec x 2 ea   NINFA x10 , PRN     SLR 2x10, BL  held   Clamshell 2x10 RTB held   Prone lying x1 min  Prone press ups 2x10 (with OP)  Prone hip ext 2x10   Standing trunk rotation GTB x10  3 way abs GTB x10 ea   Standing hip ext, abd GTB x 10 ea   Lean back 2x10  Reverse chops matrix 7.5 lbs x 10     Manual   STM to BL L/S  Informed consent given on manual treatment. Pt given opportunity to cease treatment at any time. Educated pt on expected soreness, possible ecchymosis. Pt voiced understanding .  No adverse effects were noted post tx.        HEP:  Access Code: [email or web address]  URL: [email or web address]  Date: 07/28/2025  Prepared by: Cinthia Andujar    Exercises  - Standing Back Extension  - 3-5 x daily - 7 x weekly - 1-2 sets -  10 reps  - Standing Shoulder Row with Anchored Resistance  - 1 x daily - 7 x weekly - 2 sets - 10 reps  - Shoulder Extension with Resistance  - 1 x daily - 7 x weekly - 2 sets - 10 reps  - Prone on Elbows Stretch  - 7 x weekly - 2-5 minutes hold - every 2-3 hours  frequency   - Prone Press Up  - 7 x weekly - 5-10 reps - every 2-3 hours frequency   - Supine Sciatic Nerve Glide  - 1 x daily - 7 x weekly - 3 sets - 10 reps - 2-3 seconds hold  - Seated Correct Posture  - 7 x weekly  - Seated Hamstring Stretch  - 1 x daily - 7 x weekly - 1-2 sets - 30-60 sec hold    Patient Education  - Lumbar Disk Herniation    Assessment  Pt did great with progressions today. Some cues provided for form otherwise good pacing. Some TTP R lumbar paraspinals.     Plan  Pt has no more appts scheduled. Noted he would like to try HEP for awhile. Discussed DC if he does not return in a month.         Goals:  Active       PT Problem       Reduce pain at worst to 0-3/10 with all functional and recreational activity.        Start:  07/17/25    Expected End:  10/15/25            Increase by > or = 25% without increased pain to perform dressing/bathing/grooming tasks and other functional tasks         Start:  07/17/25    Expected End:  10/15/25            Increase by > or = 1/2 mm grade to improve postural awareness and body mechanics to perform daily tasks without increased pain/compensation          Start:  07/17/25    Expected End:  10/15/25            Pt to have decrease in Oswestry by 10% to display increased overall function.        Start:  07/17/25    Expected End:  10/15/25            Sit with proper posture > or = 15 minutes without VC's or increased pain to drive, eat meals, read         Start:  07/17/25    Expected End:  10/15/25            Patient will demonstrate independence in home program for support of progression       Start:  07/17/25    Expected End:  10/15/25

## 2025-09-23 ENCOUNTER — APPOINTMENT (OUTPATIENT)
Facility: CLINIC | Age: 30
End: 2025-09-23
Payer: COMMERCIAL

## 2025-10-20 ENCOUNTER — APPOINTMENT (OUTPATIENT)
Facility: CLINIC | Age: 30
End: 2025-10-20
Payer: COMMERCIAL

## 2025-11-20 ENCOUNTER — APPOINTMENT (OUTPATIENT)
Dept: GENETICS | Facility: CLINIC | Age: 30
End: 2025-11-20
Payer: COMMERCIAL